# Patient Record
Sex: MALE | Race: BLACK OR AFRICAN AMERICAN | Employment: OTHER | URBAN - NONMETROPOLITAN AREA
[De-identification: names, ages, dates, MRNs, and addresses within clinical notes are randomized per-mention and may not be internally consistent; named-entity substitution may affect disease eponyms.]

---

## 2017-05-13 ENCOUNTER — HOSPITAL ENCOUNTER (EMERGENCY)
Age: 62
Discharge: HOME | End: 2017-05-13
Payer: MEDICARE

## 2017-05-13 ENCOUNTER — APPOINTMENT (OUTPATIENT)
Dept: GENERAL RADIOLOGY | Age: 62
End: 2017-05-13
Payer: MEDICARE

## 2017-05-13 VITALS
SYSTOLIC BLOOD PRESSURE: 159 MMHG | HEIGHT: 69 IN | OXYGEN SATURATION: 99 % | RESPIRATION RATE: 20 BRPM | BODY MASS INDEX: 26.51 KG/M2 | WEIGHT: 179 LBS | DIASTOLIC BLOOD PRESSURE: 107 MMHG | HEART RATE: 96 BPM | TEMPERATURE: 98.9 F

## 2017-05-13 DIAGNOSIS — M54.31 SCIATICA OF RIGHT SIDE: Primary | ICD-10-CM

## 2017-05-13 PROCEDURE — 96372 THER/PROPH/DIAG INJ SC/IM: CPT

## 2017-05-13 PROCEDURE — 99283 EMERGENCY DEPT VISIT LOW MDM: CPT | Performed by: NURSE PRACTITIONER

## 2017-05-13 PROCEDURE — 72100 X-RAY EXAM L-S SPINE 2/3 VWS: CPT

## 2017-05-13 PROCEDURE — 99283 EMERGENCY DEPT VISIT LOW MDM: CPT

## 2017-05-13 PROCEDURE — 6360000002 HC RX W HCPCS: Performed by: NURSE PRACTITIONER

## 2017-05-13 RX ORDER — LISINOPRIL 40 MG/1
40 TABLET ORAL DAILY
COMMUNITY
End: 2018-05-02 | Stop reason: SDUPTHER

## 2017-05-13 RX ORDER — NAPROXEN 500 MG/1
500 TABLET ORAL 2 TIMES DAILY
Qty: 20 TABLET | Refills: 0 | Status: SHIPPED | OUTPATIENT
Start: 2017-05-13 | End: 2020-01-01 | Stop reason: ALTCHOICE

## 2017-05-13 RX ORDER — TAMSULOSIN HYDROCHLORIDE 0.4 MG/1
0.4 CAPSULE ORAL DAILY
COMMUNITY
End: 2018-05-02

## 2017-05-13 RX ORDER — BUDESONIDE AND FORMOTEROL FUMARATE DIHYDRATE 160; 4.5 UG/1; UG/1
2 AEROSOL RESPIRATORY (INHALATION) 2 TIMES DAILY
COMMUNITY
End: 2018-05-02

## 2017-05-13 RX ORDER — HYDROCODONE BITARTRATE AND ACETAMINOPHEN 5; 325 MG/1; MG/1
1 TABLET ORAL EVERY 6 HOURS PRN
Qty: 15 TABLET | Refills: 0 | Status: SHIPPED | OUTPATIENT
Start: 2017-05-13 | End: 2017-05-20

## 2017-05-13 RX ORDER — CYCLOBENZAPRINE HCL 10 MG
10 TABLET ORAL 3 TIMES DAILY PRN
Qty: 30 TABLET | Refills: 0 | Status: SHIPPED | OUTPATIENT
Start: 2017-05-13 | End: 2017-05-23

## 2017-05-13 RX ORDER — DEXAMETHASONE SODIUM PHOSPHATE 4 MG/ML
4 INJECTION, SOLUTION INTRA-ARTICULAR; INTRALESIONAL; INTRAMUSCULAR; INTRAVENOUS; SOFT TISSUE ONCE
Status: COMPLETED | OUTPATIENT
Start: 2017-05-13 | End: 2017-05-13

## 2017-05-13 RX ORDER — ALBUTEROL SULFATE 90 UG/1
2 AEROSOL, METERED RESPIRATORY (INHALATION) EVERY 6 HOURS PRN
COMMUNITY
End: 2018-05-02

## 2017-05-13 RX ORDER — KETOROLAC TROMETHAMINE 30 MG/ML
30 INJECTION, SOLUTION INTRAMUSCULAR; INTRAVENOUS ONCE
Status: COMPLETED | OUTPATIENT
Start: 2017-05-13 | End: 2017-05-13

## 2017-05-13 RX ADMIN — DEXAMETHASONE SODIUM PHOSPHATE 4 MG: 4 INJECTION, SOLUTION INTRAMUSCULAR; INTRAVENOUS at 14:15

## 2017-05-13 RX ADMIN — KETOROLAC TROMETHAMINE 30 MG: 30 INJECTION, SOLUTION INTRAMUSCULAR at 14:15

## 2017-05-13 ASSESSMENT — PAIN SCALES - GENERAL
PAINLEVEL_OUTOF10: 10
PAINLEVEL_OUTOF10: 10

## 2017-05-13 ASSESSMENT — PAIN DESCRIPTION - PAIN TYPE: TYPE: ACUTE PAIN

## 2017-05-13 ASSESSMENT — PAIN DESCRIPTION - ORIENTATION: ORIENTATION: RIGHT

## 2017-05-13 ASSESSMENT — PAIN DESCRIPTION - LOCATION: LOCATION: BACK

## 2017-05-13 ASSESSMENT — ENCOUNTER SYMPTOMS: BACK PAIN: 1

## 2017-06-20 ENCOUNTER — TELEPHONE (OUTPATIENT)
Dept: CARDIOLOGY | Age: 62
End: 2017-06-20

## 2017-06-29 ENCOUNTER — TELEPHONE (OUTPATIENT)
Dept: CARDIOLOGY | Age: 62
End: 2017-06-29

## 2017-09-19 ENCOUNTER — TELEPHONE (OUTPATIENT)
Dept: CARDIOLOGY | Age: 62
End: 2017-09-19

## 2018-04-24 ENCOUNTER — TELEPHONE (OUTPATIENT)
Dept: CARDIOLOGY | Age: 63
End: 2018-04-24

## 2018-05-02 ENCOUNTER — HOSPITAL ENCOUNTER (EMERGENCY)
Age: 63
Discharge: HOME | End: 2018-05-02
Attending: FAMILY MEDICINE
Payer: MEDICARE

## 2018-05-02 VITALS
HEART RATE: 67 BPM | RESPIRATION RATE: 16 BRPM | SYSTOLIC BLOOD PRESSURE: 162 MMHG | WEIGHT: 179 LBS | OXYGEN SATURATION: 96 % | HEIGHT: 69 IN | DIASTOLIC BLOOD PRESSURE: 105 MMHG | TEMPERATURE: 98.2 F | BODY MASS INDEX: 26.51 KG/M2

## 2018-05-02 DIAGNOSIS — I10 HTN (HYPERTENSION): ICD-10-CM

## 2018-05-02 DIAGNOSIS — K40.91 UNILATERAL RECURRENT INGUINAL HERNIA WITHOUT OBSTRUCTION OR GANGRENE: Primary | ICD-10-CM

## 2018-05-02 DIAGNOSIS — I25.10 CAD (CORONARY ARTERY DISEASE): ICD-10-CM

## 2018-05-02 DIAGNOSIS — E78.00 HYPERCHOLESTEREMIA: ICD-10-CM

## 2018-05-02 PROCEDURE — 99283 EMERGENCY DEPT VISIT LOW MDM: CPT | Performed by: FAMILY MEDICINE

## 2018-05-02 PROCEDURE — 99283 EMERGENCY DEPT VISIT LOW MDM: CPT

## 2018-05-02 RX ORDER — PRAVASTATIN SODIUM 40 MG
40 TABLET ORAL DAILY
Qty: 30 TABLET | Refills: 3 | Status: SHIPPED | OUTPATIENT
Start: 2018-05-02

## 2018-05-02 RX ORDER — BUDESONIDE AND FORMOTEROL FUMARATE DIHYDRATE 160; 4.5 UG/1; UG/1
2 AEROSOL RESPIRATORY (INHALATION) 2 TIMES DAILY
Qty: 1 INHALER | Refills: 0 | Status: SHIPPED | OUTPATIENT
Start: 2018-05-02

## 2018-05-02 RX ORDER — ALBUTEROL SULFATE 90 UG/1
2 AEROSOL, METERED RESPIRATORY (INHALATION) EVERY 6 HOURS PRN
Qty: 1 INHALER | Refills: 0 | Status: SHIPPED | OUTPATIENT
Start: 2018-05-02

## 2018-05-02 RX ORDER — CLONIDINE HYDROCHLORIDE 0.1 MG/1
0.1 TABLET ORAL 2 TIMES DAILY
Qty: 60 TABLET | Refills: 0 | Status: SHIPPED | OUTPATIENT
Start: 2018-05-02

## 2018-05-02 RX ORDER — LISINOPRIL 20 MG/1
20 TABLET ORAL DAILY
Qty: 30 TABLET | Refills: 0 | Status: SHIPPED | OUTPATIENT
Start: 2018-05-02

## 2018-05-02 RX ORDER — ONDANSETRON 4 MG/1
4 TABLET, ORALLY DISINTEGRATING ORAL EVERY 8 HOURS PRN
Qty: 15 TABLET | Refills: 0 | Status: SHIPPED | OUTPATIENT
Start: 2018-05-02 | End: 2020-01-01 | Stop reason: ALTCHOICE

## 2018-05-02 RX ORDER — TAMSULOSIN HYDROCHLORIDE 0.4 MG/1
0.4 CAPSULE ORAL DAILY
Qty: 30 CAPSULE | Refills: 0 | Status: SHIPPED | OUTPATIENT
Start: 2018-05-02 | End: 2020-01-01 | Stop reason: ALTCHOICE

## 2018-05-02 RX ORDER — NITROGLYCERIN 0.4 MG/1
0.4 TABLET SUBLINGUAL EVERY 5 MIN PRN
Qty: 25 TABLET | Refills: 0 | Status: SHIPPED | OUTPATIENT
Start: 2018-05-02

## 2018-05-02 ASSESSMENT — PAIN SCALES - GENERAL: PAINLEVEL_OUTOF10: 3

## 2018-05-02 ASSESSMENT — ENCOUNTER SYMPTOMS
COLOR CHANGE: 0
NAUSEA: 1
SHORTNESS OF BREATH: 0
VOMITING: 1
BACK PAIN: 0
SORE THROAT: 0
DIARRHEA: 0
ABDOMINAL PAIN: 1
COUGH: 0
WHEEZING: 0

## 2018-05-02 ASSESSMENT — PAIN DESCRIPTION - LOCATION: LOCATION: ABDOMEN

## 2018-05-02 ASSESSMENT — PAIN DESCRIPTION - PAIN TYPE: TYPE: ACUTE PAIN

## 2018-07-19 ENCOUNTER — HOSPITAL ENCOUNTER (EMERGENCY)
Age: 63
Discharge: HOME | End: 2018-07-19
Payer: MEDICARE

## 2018-07-19 VITALS
WEIGHT: 179 LBS | HEIGHT: 69 IN | BODY MASS INDEX: 26.51 KG/M2 | TEMPERATURE: 97.9 F | SYSTOLIC BLOOD PRESSURE: 151 MMHG | OXYGEN SATURATION: 100 % | RESPIRATION RATE: 17 BRPM | HEART RATE: 87 BPM | DIASTOLIC BLOOD PRESSURE: 98 MMHG

## 2018-07-19 DIAGNOSIS — K40.91 UNILATERAL RECURRENT INGUINAL HERNIA WITHOUT OBSTRUCTION OR GANGRENE: Primary | ICD-10-CM

## 2018-07-19 DIAGNOSIS — Z76.0 ENCOUNTER FOR MEDICATION REFILL: ICD-10-CM

## 2018-07-19 PROCEDURE — 99283 EMERGENCY DEPT VISIT LOW MDM: CPT | Performed by: NURSE PRACTITIONER

## 2018-07-19 PROCEDURE — 99282 EMERGENCY DEPT VISIT SF MDM: CPT

## 2018-07-19 RX ORDER — LISINOPRIL 20 MG/1
20 TABLET ORAL DAILY
Qty: 14 TABLET | Refills: 0 | Status: ON HOLD | OUTPATIENT
Start: 2018-07-19 | End: 2018-09-25 | Stop reason: HOSPADM

## 2018-07-19 RX ORDER — HYDROCHLOROTHIAZIDE 25 MG/1
25 TABLET ORAL DAILY
Qty: 14 TABLET | Refills: 0 | Status: ON HOLD | OUTPATIENT
Start: 2018-07-19 | End: 2018-09-25 | Stop reason: HOSPADM

## 2018-07-19 ASSESSMENT — ENCOUNTER SYMPTOMS
NAUSEA: 0
COUGH: 0
ABDOMINAL PAIN: 0
VOMITING: 0
WHEEZING: 0
SHORTNESS OF BREATH: 0
DIARRHEA: 0
CONSTIPATION: 0

## 2018-07-19 ASSESSMENT — PAIN SCALES - GENERAL: PAINLEVEL_OUTOF10: 9

## 2018-07-19 NOTE — ED PROVIDER NOTES
CABG x 2 LIMA-LAD, VG-diagonal 11/2/2011  Cath  Patent LIMA-LAD, patent VG-diag, normal LVFX 3/18/2013  lexiscan  Positive for anterior and inferi    CAD (coronary artery disease) 11/2/2011    Cigarette smoker 8/19/2013    Cigarette smoker 11/2/2011    Hypercholesteremia 8/19/2013    Hypercholesteremia 11/2/2011    Hypertension 8/19/2013    Hypertension          SURGICAL HISTORY       Past Surgical History:   Procedure Laterality Date    CARDIAC CATHETERIZATION  8/19/13  JDT    with stent.  EF 60%    CORONARY ARTERY BYPASS GRAFT  11/7/2007    Dr. Pedraza Cool       Discharge Medication List as of 7/19/2018  1:10 PM      CONTINUE these medications which have NOT CHANGED    Details   ondansetron (ZOFRAN ODT) 4 MG disintegrating tablet Take 1 tablet by mouth every 8 hours as needed for Nausea or Vomiting, Disp-15 tablet, R-0Print      !! lisinopril (PRINIVIL) 20 MG tablet Take 1 tablet by mouth daily, Disp-30 tablet, R-0Print      cloNIDine (CATAPRES) 0.1 MG tablet Take 1 tablet by mouth 2 times daily If blood pressure greater than 195/95, Disp-60 tablet, R-0Print      !! metoprolol tartrate (LOPRESSOR) 25 MG tablet Take 1 tablet by mouth 2 times daily, Disp-60 tablet, R-3Print      nitroGLYCERIN (NITROSTAT) 0.4 MG SL tablet Place 1 tablet under the tongue every 5 minutes as needed for Chest pain, Disp-25 tablet, R-0Print      rivaroxaban (XARELTO) 10 MG TABS tablet Take 1 tablet by mouth daily (with breakfast), Disp-30 tablet, R-0Print      tamsulosin (FLOMAX) 0.4 MG capsule Take 1 capsule by mouth daily, Disp-30 capsule, R-0Print      pravastatin (PRAVACHOL) 40 MG tablet Take 1 tablet by mouth daily, Disp-30 tablet, R-3Print      budesonide-formoterol (SYMBICORT) 160-4.5 MCG/ACT AERO Inhale 2 puffs into the lungs 2 times daily, Disp-1 Inhaler, R-0Print      albuterol sulfate HFA (VENTOLIN HFA) 108 (90 Base) MCG/ACT inhaler Inhale 2 puffs into the lungs every 6 hours and intact distal pulses. Pulmonary/Chest: Effort normal and breath sounds normal. No respiratory distress. He has no wheezes. He has no rales. Abdominal: Soft. Bowel sounds are normal. He exhibits no distension and no mass. There is no tenderness. There is no rebound and no guarding. A hernia is present. Hernia confirmed positive in the left inguinal area. Musculoskeletal: Normal range of motion. Neurological: He is alert and oriented to person, place, and time. Skin: Skin is warm and dry. Vitals reviewed. DIAGNOSTIC RESULTS     RADIOLOGY:   Non-plain film images such as CT, Ultrasound and MRI are read by the radiologist. Plain radiographic images are visualized and preliminarily interpreted by No att. providers found with the below findings:      Interpretation per the Radiologist below, if available at the time of this note:    No orders to display       LABS:  Labs Reviewed - No data to display    All other labs were within normal range or not returned as of this dictation. RE-ASSESSMENT        EMERGENCY DEPARTMENT COURSE and DIFFERENTIAL DIAGNOSIS/MDM:   Vitals:    Vitals:    07/19/18 1209 07/19/18 1313   BP: (!) 158/96 (!) 151/98   Pulse: 91 87   Resp: 18 17   Temp: 97.9 °F (36.6 °C) 97.9 °F (36.6 °C)   TempSrc: Temporal    SpO2: 96% 100%   Weight: 179 lb (81.2 kg)    Height: 5' 9\" (1.753 m)          MDM  Number of Diagnoses or Management Options  Encounter for medication refill: new, no workup  Unilateral recurrent inguinal hernia without obstruction or gangrene: new, no workup  Diagnosis management comments: Patient is slightly hypertensive today. As he was during the visit in May. I feel the patient's blood pressure is elevated due to him being non-compliant with his medications. He denies having a headache, visual disturbances, dizziness, slurred speech, or facial numbness. I believe the patient is here today for refills on his blood pressure medication. He does not appear toxic.  He has tolerated oral intake without any difficulty. Instructed the patient to follow-up with Dr. Tatyana Benites as soon as possible for further evaluation of the hernia. I instructed him to follow-up with Dr. Sebas Scott this coming week. I gave him strict ER return instructions if his symptoms become worse. The patient agrees with the discharge plan. Amount and/or Complexity of Data Reviewed  Discuss the patient with other providers: yes (Dr. Davida Hernandez.  )    Risk of Complications, Morbidity, and/or Mortality  Presenting problems: low  Diagnostic procedures: low  Management options: low    Patient Progress  Patient progress: stable      Procedures      FINAL IMPRESSION      1. Unilateral recurrent inguinal hernia without obstruction or gangrene    2. Encounter for medication refill          DISPOSITION/PLAN   DISPOSITION Decision To Discharge 07/19/2018 01:02:00 PM      PATIENT REFERRED TO:  Antoni Rosa MD  [facility]  [phone]    Schedule an appointment as soon as possible for a visit   for this coming week. [facility] EMERGENCY DEPT  [facility]  [phone]    If symptoms worsen, As needed    Faraz Ingram MD  [address] [facility]  [phone]    Schedule an appointment as soon as possible for a visit         DISCHARGE MEDICATIONS:  Discharge Medication List as of 7/19/2018  1:10 PM      START taking these medications    Details   !! metoprolol tartrate (LOPRESSOR) 25 MG tablet Take 1 tablet by mouth 2 times daily for 14 days, Disp-28 tablet, R-0Print      !! hydrochlorothiazide (HYDRODIURIL) 25 MG tablet Take 1 tablet by mouth daily for 14 days, Disp-14 tablet, R-0Print      !! lisinopril (PRINIVIL) 20 MG tablet Take 1 tablet by mouth daily for 14 days, Disp-14 tablet, R-0Print       !! - Potential duplicate medications found. Please discuss with provider.           (Please note that portions of this note were completed with a voice

## 2018-09-22 ENCOUNTER — APPOINTMENT (OUTPATIENT)
Dept: GENERAL RADIOLOGY | Age: 63
DRG: 287 | End: 2018-09-22
Payer: MEDICARE

## 2018-09-22 ENCOUNTER — HOSPITAL ENCOUNTER (INPATIENT)
Age: 63
LOS: 3 days | Discharge: HOME | DRG: 287 | End: 2018-09-25
Attending: EMERGENCY MEDICINE | Admitting: INTERNAL MEDICINE
Payer: MEDICARE

## 2018-09-22 DIAGNOSIS — R77.8 ELEVATED TROPONIN: ICD-10-CM

## 2018-09-22 DIAGNOSIS — R07.89 ATYPICAL CHEST PAIN: Primary | ICD-10-CM

## 2018-09-22 LAB
ALBUMIN SERPL-MCNC: 4 G/DL (ref 3.5–5.2)
ALP BLD-CCNC: 98 U/L (ref 40–130)
ALT SERPL-CCNC: 31 U/L (ref 5–41)
ANION GAP SERPL CALCULATED.3IONS-SCNC: 9 MMOL/L (ref 7–19)
APTT: 31.4 SEC (ref 26–36.2)
AST SERPL-CCNC: 73 U/L (ref 5–40)
BASOPHILS ABSOLUTE: 0 K/UL (ref 0–0.2)
BASOPHILS RELATIVE PERCENT: 0.5 % (ref 0–1)
BILIRUB SERPL-MCNC: <0.2 MG/DL (ref 0.2–1.2)
BUN BLDV-MCNC: 9 MG/DL (ref 8–23)
CALCIUM SERPL-MCNC: 9.3 MG/DL (ref 8.8–10.2)
CHLORIDE BLD-SCNC: 103 MMOL/L (ref 98–111)
CO2: 25 MMOL/L (ref 22–29)
CREAT SERPL-MCNC: 1.1 MG/DL (ref 0.5–1.2)
EOSINOPHILS ABSOLUTE: 0.2 K/UL (ref 0–0.6)
EOSINOPHILS RELATIVE PERCENT: 2.1 % (ref 0–5)
GFR NON-AFRICAN AMERICAN: >60
GLUCOSE BLD-MCNC: 90 MG/DL (ref 74–109)
HCT VFR BLD CALC: 41.9 % (ref 42–52)
HEMOGLOBIN: 13.9 G/DL (ref 14–18)
INR BLD: 1.01 (ref 0.88–1.18)
LIPASE: 23 U/L (ref 13–60)
LYMPHOCYTES ABSOLUTE: 2.8 K/UL (ref 1.1–4.5)
LYMPHOCYTES RELATIVE PERCENT: 33.9 % (ref 20–40)
MCH RBC QN AUTO: 27.8 PG (ref 27–31)
MCHC RBC AUTO-ENTMCNC: 33.2 G/DL (ref 33–37)
MCV RBC AUTO: 83.8 FL (ref 80–94)
MONOCYTES ABSOLUTE: 0.6 K/UL (ref 0–0.9)
MONOCYTES RELATIVE PERCENT: 6.8 % (ref 0–10)
NEUTROPHILS ABSOLUTE: 4.7 K/UL (ref 1.5–7.5)
NEUTROPHILS RELATIVE PERCENT: 56.2 % (ref 50–65)
PDW BLD-RTO: 13.6 % (ref 11.5–14.5)
PLATELET # BLD: 194 K/UL (ref 130–400)
PMV BLD AUTO: 12 FL (ref 9.4–12.4)
POTASSIUM SERPL-SCNC: 4.1 MMOL/L (ref 3.5–5)
PROTHROMBIN TIME: 13.2 SEC (ref 12–14.6)
RBC # BLD: 5 M/UL (ref 4.7–6.1)
SODIUM BLD-SCNC: 137 MMOL/L (ref 136–145)
TOTAL PROTEIN: 7.1 G/DL (ref 6.6–8.7)
TROPONIN: 0.69 NG/ML (ref 0–0.03)
TROPONIN: 0.83 NG/ML (ref 0–0.03)
TROPONIN: 0.95 NG/ML (ref 0–0.03)
TROPONIN: 1 NG/ML (ref 0–0.03)
WBC # BLD: 8.3 K/UL (ref 4.8–10.8)

## 2018-09-22 PROCEDURE — 93005 ELECTROCARDIOGRAM TRACING: CPT

## 2018-09-22 PROCEDURE — 80053 COMPREHEN METABOLIC PANEL: CPT

## 2018-09-22 PROCEDURE — 71045 X-RAY EXAM CHEST 1 VIEW: CPT

## 2018-09-22 PROCEDURE — 84484 ASSAY OF TROPONIN QUANT: CPT

## 2018-09-22 PROCEDURE — 96375 TX/PRO/DX INJ NEW DRUG ADDON: CPT

## 2018-09-22 PROCEDURE — 99285 EMERGENCY DEPT VISIT HI MDM: CPT | Performed by: EMERGENCY MEDICINE

## 2018-09-22 PROCEDURE — 99223 1ST HOSP IP/OBS HIGH 75: CPT | Performed by: INTERNAL MEDICINE

## 2018-09-22 PROCEDURE — S0028 INJECTION, FAMOTIDINE, 20 MG: HCPCS | Performed by: EMERGENCY MEDICINE

## 2018-09-22 PROCEDURE — 2500000003 HC RX 250 WO HCPCS: Performed by: EMERGENCY MEDICINE

## 2018-09-22 PROCEDURE — 96372 THER/PROPH/DIAG INJ SC/IM: CPT

## 2018-09-22 PROCEDURE — 36415 COLL VENOUS BLD VENIPUNCTURE: CPT

## 2018-09-22 PROCEDURE — 85730 THROMBOPLASTIN TIME PARTIAL: CPT

## 2018-09-22 PROCEDURE — 6370000000 HC RX 637 (ALT 250 FOR IP): Performed by: EMERGENCY MEDICINE

## 2018-09-22 PROCEDURE — 83690 ASSAY OF LIPASE: CPT

## 2018-09-22 PROCEDURE — 6360000002 HC RX W HCPCS: Performed by: INTERNAL MEDICINE

## 2018-09-22 PROCEDURE — 6360000002 HC RX W HCPCS: Performed by: EMERGENCY MEDICINE

## 2018-09-22 PROCEDURE — 96374 THER/PROPH/DIAG INJ IV PUSH: CPT

## 2018-09-22 PROCEDURE — 2580000003 HC RX 258: Performed by: INTERNAL MEDICINE

## 2018-09-22 PROCEDURE — 2140000000 HC CCU INTERMEDIATE R&B

## 2018-09-22 PROCEDURE — 85610 PROTHROMBIN TIME: CPT

## 2018-09-22 PROCEDURE — 99285 EMERGENCY DEPT VISIT HI MDM: CPT

## 2018-09-22 PROCEDURE — 6370000000 HC RX 637 (ALT 250 FOR IP): Performed by: INTERNAL MEDICINE

## 2018-09-22 PROCEDURE — 85025 COMPLETE CBC W/AUTO DIFF WBC: CPT

## 2018-09-22 RX ORDER — ASPIRIN 81 MG/1
81 TABLET, CHEWABLE ORAL DAILY
Status: DISCONTINUED | OUTPATIENT
Start: 2018-09-22 | End: 2018-09-22

## 2018-09-22 RX ORDER — SODIUM CHLORIDE 0.9 % (FLUSH) 0.9 %
10 SYRINGE (ML) INJECTION EVERY 12 HOURS SCHEDULED
Status: DISCONTINUED | OUTPATIENT
Start: 2018-09-22 | End: 2018-09-22 | Stop reason: SDUPTHER

## 2018-09-22 RX ORDER — OXYCODONE HYDROCHLORIDE AND ACETAMINOPHEN 5; 325 MG/1; MG/1
2 TABLET ORAL EVERY 4 HOURS PRN
Status: DISCONTINUED | OUTPATIENT
Start: 2018-09-22 | End: 2018-09-25 | Stop reason: HOSPADM

## 2018-09-22 RX ORDER — ACETAMINOPHEN 325 MG/1
650 TABLET ORAL EVERY 4 HOURS PRN
Status: DISCONTINUED | OUTPATIENT
Start: 2018-09-22 | End: 2018-09-25 | Stop reason: HOSPADM

## 2018-09-22 RX ORDER — MAGNESIUM HYDROXIDE/ALUMINUM HYDROXICE/SIMETHICONE 120; 1200; 1200 MG/30ML; MG/30ML; MG/30ML
30 SUSPENSION ORAL ONCE
Status: COMPLETED | OUTPATIENT
Start: 2018-09-22 | End: 2018-09-22

## 2018-09-22 RX ORDER — ONDANSETRON 2 MG/ML
4 INJECTION INTRAMUSCULAR; INTRAVENOUS EVERY 6 HOURS PRN
Status: DISCONTINUED | OUTPATIENT
Start: 2018-09-22 | End: 2018-09-22

## 2018-09-22 RX ORDER — PRAVASTATIN SODIUM 20 MG
40 TABLET ORAL NIGHTLY
Status: DISCONTINUED | OUTPATIENT
Start: 2018-09-22 | End: 2018-09-25 | Stop reason: HOSPADM

## 2018-09-22 RX ORDER — ASPIRIN 81 MG/1
81 TABLET, CHEWABLE ORAL DAILY
Status: DISCONTINUED | OUTPATIENT
Start: 2018-09-23 | End: 2018-09-25 | Stop reason: HOSPADM

## 2018-09-22 RX ORDER — HYDROCODONE BITARTRATE AND ACETAMINOPHEN 5; 325 MG/1; MG/1
1 TABLET ORAL EVERY 8 HOURS PRN
Status: DISCONTINUED | OUTPATIENT
Start: 2018-09-22 | End: 2018-09-25 | Stop reason: HOSPADM

## 2018-09-22 RX ORDER — CLONIDINE HYDROCHLORIDE 0.1 MG/1
0.1 TABLET ORAL 2 TIMES DAILY
Status: DISCONTINUED | OUTPATIENT
Start: 2018-09-22 | End: 2018-09-25 | Stop reason: HOSPADM

## 2018-09-22 RX ORDER — SODIUM CHLORIDE 0.9 % (FLUSH) 0.9 %
10 SYRINGE (ML) INJECTION PRN
Status: DISCONTINUED | OUTPATIENT
Start: 2018-09-22 | End: 2018-09-22

## 2018-09-22 RX ORDER — HYDROCHLOROTHIAZIDE 25 MG/1
25 TABLET ORAL DAILY
Status: DISCONTINUED | OUTPATIENT
Start: 2018-09-22 | End: 2018-09-25 | Stop reason: HOSPADM

## 2018-09-22 RX ORDER — SODIUM CHLORIDE 0.9 % (FLUSH) 0.9 %
10 SYRINGE (ML) INJECTION EVERY 12 HOURS SCHEDULED
Status: DISCONTINUED | OUTPATIENT
Start: 2018-09-22 | End: 2018-09-25 | Stop reason: HOSPADM

## 2018-09-22 RX ORDER — ASPIRIN 81 MG/1
81 TABLET, CHEWABLE ORAL DAILY
Status: DISCONTINUED | OUTPATIENT
Start: 2018-09-23 | End: 2018-09-22 | Stop reason: SDUPTHER

## 2018-09-22 RX ORDER — TAMSULOSIN HYDROCHLORIDE 0.4 MG/1
0.4 CAPSULE ORAL DAILY
Status: DISCONTINUED | OUTPATIENT
Start: 2018-09-22 | End: 2018-09-25 | Stop reason: HOSPADM

## 2018-09-22 RX ORDER — LISINOPRIL 20 MG/1
20 TABLET ORAL DAILY
Status: DISCONTINUED | OUTPATIENT
Start: 2018-09-22 | End: 2018-09-25 | Stop reason: HOSPADM

## 2018-09-22 RX ORDER — MORPHINE SULFATE/0.9% NACL/PF 1 MG/ML
4 SYRINGE (ML) INJECTION EVERY 4 HOURS PRN
Status: DISCONTINUED | OUTPATIENT
Start: 2018-09-22 | End: 2018-09-25 | Stop reason: HOSPADM

## 2018-09-22 RX ORDER — OXYCODONE HYDROCHLORIDE AND ACETAMINOPHEN 5; 325 MG/1; MG/1
1 TABLET ORAL EVERY 4 HOURS PRN
Status: DISCONTINUED | OUTPATIENT
Start: 2018-09-22 | End: 2018-09-25 | Stop reason: HOSPADM

## 2018-09-22 RX ORDER — ONDANSETRON 2 MG/ML
4 INJECTION INTRAMUSCULAR; INTRAVENOUS EVERY 6 HOURS PRN
Status: DISCONTINUED | OUTPATIENT
Start: 2018-09-22 | End: 2018-09-25 | Stop reason: HOSPADM

## 2018-09-22 RX ADMIN — FAMOTIDINE 20 MG: 10 INJECTION, SOLUTION INTRAVENOUS at 13:24

## 2018-09-22 RX ADMIN — ALUMINUM HYDROXIDE, MAGNESIUM HYDROXIDE, AND SIMETHICONE 30 ML: 200; 200; 20 SUSPENSION ORAL at 13:24

## 2018-09-22 RX ADMIN — HYDROCHLOROTHIAZIDE 25 MG: 25 TABLET ORAL at 18:37

## 2018-09-22 RX ADMIN — CLONIDINE HYDROCHLORIDE 0.1 MG: 0.1 TABLET ORAL at 20:09

## 2018-09-22 RX ADMIN — Medication 10 ML: at 20:10

## 2018-09-22 RX ADMIN — ENOXAPARIN SODIUM 80 MG: 80 INJECTION SUBCUTANEOUS at 14:33

## 2018-09-22 RX ADMIN — METOPROLOL TARTRATE 25 MG: 25 TABLET ORAL at 20:09

## 2018-09-22 RX ADMIN — TAMSULOSIN HYDROCHLORIDE 0.4 MG: 0.4 CAPSULE ORAL at 18:37

## 2018-09-22 RX ADMIN — LISINOPRIL 20 MG: 20 TABLET ORAL at 18:37

## 2018-09-22 RX ADMIN — OXYCODONE HYDROCHLORIDE AND ACETAMINOPHEN 1 TABLET: 5; 325 TABLET ORAL at 21:47

## 2018-09-22 RX ADMIN — ENOXAPARIN SODIUM 80 MG: 80 INJECTION SUBCUTANEOUS at 23:55

## 2018-09-22 RX ADMIN — PRAVASTATIN SODIUM 40 MG: 20 TABLET ORAL at 20:09

## 2018-09-22 RX ADMIN — ONDANSETRON 4 MG: 2 INJECTION INTRAMUSCULAR; INTRAVENOUS at 20:09

## 2018-09-22 RX ADMIN — Medication 4 MG: at 13:24

## 2018-09-22 RX ADMIN — MOMETASONE FUROATE AND FORMOTEROL FUMARATE DIHYDRATE 2 PUFF: 100; 5 AEROSOL RESPIRATORY (INHALATION) at 20:09

## 2018-09-22 ASSESSMENT — ENCOUNTER SYMPTOMS
NAUSEA: 0
DIARRHEA: 0
RHINORRHEA: 0
BACK PAIN: 0
CONSTIPATION: 0
CHEST TIGHTNESS: 0
SORE THROAT: 0
ABDOMINAL DISTENTION: 0
BLOOD IN STOOL: 0
TROUBLE SWALLOWING: 0
ABDOMINAL PAIN: 1
VOMITING: 0
SHORTNESS OF BREATH: 0
COUGH: 0

## 2018-09-22 ASSESSMENT — PAIN SCALES - GENERAL
PAINLEVEL_OUTOF10: 0
PAINLEVEL_OUTOF10: 6
PAINLEVEL_OUTOF10: 5

## 2018-09-22 NOTE — ED NOTES
Bed: 08  Expected date:   Expected time:   Means of arrival:   Comments:  Ems chest pain     Julia Leon RN  09/22/18 [id number]

## 2018-09-22 NOTE — H&P
[facility]  History & Physical    History obtained from:   [x] Patient  [x] Other (specify):     Cc:  Chest pain    HPI: Mr. Anola Burkitt is a 58 y.o. male with a history of CABG with LIMA LAD and SVG to DIag who had last cath in 2013 with BMS to SVG diag and LIMA LAD occluded. LAD diffuse disease, RCA mild disease and CFX mild disease. Admitted today with ACS    ROS    Past Medical History:   Diagnosis Date    CAD (coronary artery disease) 8/19/2013    10/22/2004  Stent to LAD 12/17/2004  Cath  Patent stent in the LAD, normal LVFX   6/9/2005  Cath  Patent stent in the LAD, normal LVFX 9/19/2005  cardiolite negative for myocardial ischemia 7/24/2006  Non Q wave MI 7/24/2006  PTCA to diagonal 11/7/2007  CABG x 2 LIMA-LAD, VG-diagonal 11/2/2011  Cath  Patent LIMA-LAD, patent VG-diag, normal LVFX 3/18/2013  lexiscan  Positive for anterior and inferi    Cigarette smoker 8/19/2013    Hypercholesteremia 8/19/2013    Hypertension 8/19/2013       Past Surgical History:   Procedure Laterality Date    CARDIAC CATHETERIZATION  8/19/13  JDT    with stent. EF 60%    CORONARY ARTERY BYPASS GRAFT  11/7/2007    Dr. oClten Arambula         Family History   Problem Relation Age of Onset    High Blood Pressure Mother     High Blood Pressure Father     Cancer Sister        Social History     Social History    Marital status:      Spouse name: N/A    Number of children: N/A    Years of education: N/A     Occupational History    Not on file.      Social History Main Topics    Smoking status: Current Every Day Smoker     Packs/day: 1.00     Years: 14.00    Smokeless tobacco: Never Used    Alcohol use Yes      Comment: occas---once/year    Drug use: No    Sexual activity: Not on file     Other Topics Concern    Not on file     Social History Narrative    ** Merged History Encounter **            Allergies   Allergen Reactions    Sulfa Antibiotics     Iv Contrast [Iodides] rash       Current Meds   sodium chloride flush  10 mL Intravenous 2 times per day    aspirin  81 mg Oral Daily       Current Infused Meds      PE:  Vitals:    09/22/18 1600   BP:    Pulse: 66   Resp:    Temp:    SpO2:      No intake or output data in the 24 hours ending 09/22/18 1718  Estimated body mass index is 25.77 kg/m² as calculated from the following:    Height as of this encounter: 5' 9\" (1.753 m). Weight as of this encounter: 174 lb 8 oz (79.2 kg).     General - No acute distress  Eyes - PERRL, anicteric sclerae; no lid-lag  ENMT - Atraumatic; Mucous membranes moist, oropharynx clear  Neck - trachea midline, thyroid non-tender  Cardio - No jugular venous distension                Clear s1 s2, no gallop, rub, murmur                 No edema, normal pulses  Resp - Normal effort, Clear to auscultation bilaterally  GI - abdomen soft, non-tender, no hepatosplenomegaly  Skin - warm and dry; no rashes  Psych - A+O x 3, normal affect    Recent Labs      09/22/18   1249   WBC  8.3   HGB  13.9*   PLT  194     Recent Labs      09/22/18   1249   NA  137   K  4.1   CL  103   CO2  25   BUN  9   CREATININE  1.1   LABGLOM  >60   CALCIUM  9.3     Recent Labs      09/22/18   1249  09/22/18   1430   TROPONINI  0.69*  0.83*       ECG 09/22/18     TTE    Cath      Assessment, Recommendations & Plan:  58 y.o. male with ACS will need LHC early next week     Place on lovenox and optimize medical therapy

## 2018-09-22 NOTE — CARE COORDINATION
Was asked to see patient re: his inability to afford his medications. Evidently when patient was here in the ED in July, he was given several prescriptions to fill and he stated to me that they were going to cost over $300 so he didn't fill them. Asked him if he had been to the doctor since that ER visit and he said he had. I asked him if he had said anything to his PCP about his inability to pay for his medications and he said no. Patient has drug coverage from [facility] and uses [facility] in [place] and sometimes [facility] at [place]. [facility] closed, [facility] unable to assist in determining costs without current scripts. Call placed to [facility] and spoke with Laxmi Macias. She told me that the patient is current with them and is not in the donut hole. She kindly gave me the copay amounts for the following medications:  NTG $12, Xarelto $47, metoprolol $5, Lisinopril $5, Zofran $2.85, Flomax $14.20 ,Pravachol $7, Symbicort $47, and Ventolin $47.  Per Sotero Rose in pharmacy, could substitute albuterol nebs for the Ventolin and it would probably be more cost effective (family states they have a nebulizer at home) and possibly Pulmicort nebs for the Symbicort. Gave family info for Xarelto support program and encouraged her to go online and register for any assistance they could get to help with the cost of the Xarelto. While the total copay is only $188, that amt appears to be somewhat cost prohibitive for this patient. Understand patient to be admitted. ..have given the family the copays quoted by [facility] so they will know what to expect upon discharge. Discharge planner/Case management to follow while an inpatient.   Electronically signed by Kala Lynn RN on 9/22/2018 at 2:31 PM

## 2018-09-22 NOTE — ED NOTES
Chema Counter ASSESSMENT:  Pt co CP since 0200 am, took 1 nitro with relief. Pt denies pain at this time, denies SOB. SKIN:  Warm, dry, pink. Cap refill < 2 sec  CARDIAC:  S1 S2 noted  LUNGS: clear upper and lower lobes. Respirations even and unlabored. ABDOMEN: bowel sounds noted upper and lower quadrants. Soft and tender. EXTREMITIES: bilateral DP and PT. No edema noted. Pt alert and oriented x4. Pupils equal and reactive. No distress noted. Side rails up and call light within reach.          Pao Ruiz RN  09/22/18 [id number]

## 2018-09-22 NOTE — ED PROVIDER NOTES
mouth daily     HYDROCHLOROTHIAZIDE (HYDRODIURIL) 25 MG TABLET    Take 1 tablet by mouth daily for 14 days    HYDROCODONE-ACETAMINOPHEN (NORCO) 5-325 MG PER TABLET    Take 1 tablet by mouth every 8 hours as needed for Pain for up to 20 doses. Sedation precautions please    LISINOPRIL (PRINIVIL) 20 MG TABLET    Take 1 tablet by mouth daily    LISINOPRIL (PRINIVIL) 20 MG TABLET    Take 1 tablet by mouth daily for 14 days    METOPROLOL TARTRATE (LOPRESSOR) 25 MG TABLET    Take 1 tablet by mouth 2 times daily    METOPROLOL TARTRATE (LOPRESSOR) 25 MG TABLET    Take 1 tablet by mouth 2 times daily for 14 days    NAPROXEN (NAPROSYN) 500 MG TABLET    Take 1 tablet by mouth 2 times daily    NITROGLYCERIN (NITROSTAT) 0.4 MG SL TABLET    Place 1 tablet under the tongue every 5 minutes as needed for Chest pain    ONDANSETRON (ZOFRAN ODT) 4 MG DISINTEGRATING TABLET    Take 1 tablet by mouth every 8 hours as needed for Nausea or Vomiting    PRAVASTATIN (PRAVACHOL) 40 MG TABLET    Take 1 tablet by mouth daily    RIVAROXABAN (XARELTO) 10 MG TABS TABLET    Take 1 tablet by mouth daily (with breakfast)    TAMSULOSIN (FLOMAX) 0.4 MG CAPSULE    Take 1 capsule by mouth daily       ALLERGIES     Sulfa antibiotics and Iv contrast [iodides]    FAMILY HISTORY     No family history on file.     SOCIAL HISTORY       Social History     Social History    Marital status:      Spouse name: N/A    Number of children: N/A    Years of education: N/A     Social History Main Topics    Smoking status: Current Every Day Smoker     Packs/day: 1.00    Smokeless tobacco: Not on file    Alcohol use Yes      Comment: occas    Drug use: No    Sexual activity: Not on file     Other Topics Concern    Not on file     Social History Narrative    ** Merged History Encounter **            SCREENINGS           PHYSICAL EXAM    (up to 7 for level 4, 8 or more for level 5)     ED Triage Vitals [09/22/18 1242]   BP Temp Temp Source Pulse Resp SpO2 Height Weight   (!) 166/110 98.1 °F (36.7 °C) Oral 60 16 98 % 5' 9\" (1.753 m) 179 lb (81.2 kg)       Physical Exam   Constitutional: He is oriented to person, place, and time. He appears well-developed and well-nourished. No distress. HENT:   Head: Normocephalic and atraumatic. Mouth/Throat: Oropharynx is clear and moist.   Eyes: Pupils are equal, round, and reactive to light. EOM are normal. No scleral icterus. Neck: Normal range of motion. Neck supple. No tracheal deviation present. Cardiovascular: Normal rate, regular rhythm, normal heart sounds and intact distal pulses. Exam reveals no gallop and no friction rub. No murmur heard. Pulmonary/Chest: Effort normal and breath sounds normal. No respiratory distress. He has no wheezes. He has no rales. He exhibits no tenderness. Abdominal: Soft. He exhibits no distension and no mass. There is tenderness (Mild epigastric). There is no rebound and no guarding. Musculoskeletal: Normal range of motion. He exhibits no edema, tenderness or deformity. Neurological: He is alert and oriented to person, place, and time. No cranial nerve deficit. He exhibits normal muscle tone. Coordination normal.   Skin: Skin is warm and dry. No rash noted. Psychiatric: He has a normal mood and affect. His behavior is normal. Judgment and thought content normal.   Nursing note and vitals reviewed. DIAGNOSTIC RESULTS     EKG: All EKG's are interpreted by the Emergency Department Physician who either signs or Co-signs this chart in the absence of a cardiologist.    12 38: Sinus rhythm, 64 bpm, no STEMI, no ischemic changes, T waves inversions in leads 3 and V6, similar morphology to EKG in December 20, 2016. Repeat EKG at 1431: Sinus rhythm, bradycardic, 58 bpm, no STEMI, no ischemic changes, possible half millimeter ST elevation concave upwards in lead V1, T-wave inversions in lead 3 have resolved, V6 appears to be biphasic.  No other significant morphological changes from disposition dependent upon workup. ED Course  Patient was seen and evaluated for his chest pain. He was pain-free on his evaluation in the emergency department. Labs are drawn, EKG obtained, chest x-ray obtained, troponin came back at 0.69. Cardiology consult and spoke with Dr. Luiza Thomas for cardiology. We'll admit patient. We'll anticoagulate with full dose Lovenox. 4 consider patient regarding expenses limits. Patient remains pain-free at this point in time. I had a detailed discussion with the patient and/or guarding regarding the historical points, exam findings, and any diagnostic results supporting the admission diagnosis. The patient was educated on care and need for admission. Questions were invited and answered. Patient/guardian shows understanding of admission information and agrees to follow. CONSULTS:  IP CONSULT TO CARDIOLOGY    PROCEDURES:  Unless otherwise noted below, none     Procedures    FINAL IMPRESSION      1. Atypical chest pain    2.  Elevated troponin          DISPOSITION/PLAN   DISPOSITION Admitted 09/22/2018 02:27:21 PM      PATIENT REFERRED TO:  Hai Vick MD  [facility]  [phone]            DISCHARGE MEDICATIONS:  New Prescriptions    No medications on file          (Please note that portions of this note were completed with a voice recognition program.  Efforts were made to edit the dictations but occasionally words are mis-transcribed.)    Verónica Bustillo MD (electronically signed)  Attending Emergency Physician          Verónica Bustillo MD  09/22/18 476 Hugh Aguirre MD  09/22/18 1871

## 2018-09-23 LAB
ANION GAP SERPL CALCULATED.3IONS-SCNC: 13 MMOL/L (ref 7–19)
BUN BLDV-MCNC: 8 MG/DL (ref 8–23)
CALCIUM SERPL-MCNC: 9.1 MG/DL (ref 8.8–10.2)
CHLORIDE BLD-SCNC: 103 MMOL/L (ref 98–111)
CHOLESTEROL, TOTAL: 170 MG/DL (ref 160–199)
CO2: 24 MMOL/L (ref 22–29)
CREAT SERPL-MCNC: 1.2 MG/DL (ref 0.5–1.2)
EKG P AXIS: -5 DEGREES
EKG P-R INTERVAL: 140 MS
EKG Q-T INTERVAL: 428 MS
EKG QRS DURATION: 84 MS
EKG QTC CALCULATION (BAZETT): 435 MS
EKG T AXIS: -19 DEGREES
GFR NON-AFRICAN AMERICAN: >60
GLUCOSE BLD-MCNC: 117 MG/DL (ref 74–109)
HCT VFR BLD CALC: 41.8 % (ref 42–52)
HDLC SERPL-MCNC: 34 MG/DL (ref 55–121)
HEMOGLOBIN: 14 G/DL (ref 14–18)
LDL CHOLESTEROL CALCULATED: 104 MG/DL
MCH RBC QN AUTO: 28.3 PG (ref 27–31)
MCHC RBC AUTO-ENTMCNC: 33.5 G/DL (ref 33–37)
MCV RBC AUTO: 84.4 FL (ref 80–94)
PDW BLD-RTO: 13.6 % (ref 11.5–14.5)
PLATELET # BLD: 205 K/UL (ref 130–400)
PMV BLD AUTO: 11.1 FL (ref 9.4–12.4)
POTASSIUM REFLEX MAGNESIUM: 3.9 MMOL/L (ref 3.5–5)
RBC # BLD: 4.95 M/UL (ref 4.7–6.1)
SODIUM BLD-SCNC: 140 MMOL/L (ref 136–145)
TRIGL SERPL-MCNC: 158 MG/DL (ref 0–149)
WBC # BLD: 11.6 K/UL (ref 4.8–10.8)

## 2018-09-23 PROCEDURE — 85027 COMPLETE CBC AUTOMATED: CPT

## 2018-09-23 PROCEDURE — 36415 COLL VENOUS BLD VENIPUNCTURE: CPT

## 2018-09-23 PROCEDURE — 2140000000 HC CCU INTERMEDIATE R&B

## 2018-09-23 PROCEDURE — 80061 LIPID PANEL: CPT

## 2018-09-23 PROCEDURE — 2580000003 HC RX 258: Performed by: INTERNAL MEDICINE

## 2018-09-23 PROCEDURE — 99233 SBSQ HOSP IP/OBS HIGH 50: CPT | Performed by: INTERNAL MEDICINE

## 2018-09-23 PROCEDURE — 80048 BASIC METABOLIC PNL TOTAL CA: CPT

## 2018-09-23 PROCEDURE — 6370000000 HC RX 637 (ALT 250 FOR IP): Performed by: INTERNAL MEDICINE

## 2018-09-23 PROCEDURE — 6360000002 HC RX W HCPCS: Performed by: INTERNAL MEDICINE

## 2018-09-23 RX ORDER — SODIUM CHLORIDE 0.9 % (FLUSH) 0.9 %
10 SYRINGE (ML) INJECTION EVERY 12 HOURS SCHEDULED
Status: DISCONTINUED | OUTPATIENT
Start: 2018-09-23 | End: 2018-09-24

## 2018-09-23 RX ORDER — ASPIRIN 325 MG
325 TABLET ORAL ONCE
Status: COMPLETED | OUTPATIENT
Start: 2018-09-23 | End: 2018-09-24

## 2018-09-23 RX ORDER — SODIUM CHLORIDE 0.9 % (FLUSH) 0.9 %
10 SYRINGE (ML) INJECTION PRN
Status: DISCONTINUED | OUTPATIENT
Start: 2018-09-23 | End: 2018-09-24

## 2018-09-23 RX ADMIN — Medication 10 ML: at 10:25

## 2018-09-23 RX ADMIN — ASPIRIN 81 MG CHEWABLE TABLET 81 MG: 81 TABLET CHEWABLE at 10:25

## 2018-09-23 RX ADMIN — HYDROCHLOROTHIAZIDE 25 MG: 25 TABLET ORAL at 10:25

## 2018-09-23 RX ADMIN — METOPROLOL TARTRATE 25 MG: 25 TABLET ORAL at 10:25

## 2018-09-23 RX ADMIN — MOMETASONE FUROATE AND FORMOTEROL FUMARATE DIHYDRATE 2 PUFF: 100; 5 AEROSOL RESPIRATORY (INHALATION) at 10:25

## 2018-09-23 RX ADMIN — LISINOPRIL 20 MG: 20 TABLET ORAL at 10:25

## 2018-09-23 RX ADMIN — PRAVASTATIN SODIUM 40 MG: 20 TABLET ORAL at 20:06

## 2018-09-23 RX ADMIN — CLONIDINE HYDROCHLORIDE 0.1 MG: 0.1 TABLET ORAL at 10:25

## 2018-09-23 RX ADMIN — TAMSULOSIN HYDROCHLORIDE 0.4 MG: 0.4 CAPSULE ORAL at 10:25

## 2018-09-23 RX ADMIN — ONDANSETRON 4 MG: 2 INJECTION INTRAMUSCULAR; INTRAVENOUS at 02:32

## 2018-09-23 RX ADMIN — ENOXAPARIN SODIUM 80 MG: 80 INJECTION SUBCUTANEOUS at 10:25

## 2018-09-23 RX ADMIN — METOPROLOL TARTRATE 25 MG: 25 TABLET ORAL at 20:07

## 2018-09-23 RX ADMIN — ENOXAPARIN SODIUM 80 MG: 80 INJECTION SUBCUTANEOUS at 20:07

## 2018-09-23 RX ADMIN — CLONIDINE HYDROCHLORIDE 0.1 MG: 0.1 TABLET ORAL at 20:07

## 2018-09-23 NOTE — PLAN OF CARE
Problem: Tobacco Use:  Goal: Will participate in inpatient tobacco-use cessation counseling  Will participate in inpatient tobacco-use cessation counseling   Outcome: Ongoing      Problem: Falls - Risk of:  Goal: Will remain free from falls  Will remain free from falls   Outcome: Ongoing    Goal: Absence of physical injury  Absence of physical injury   Outcome: Ongoing      Problem: Pain:  Goal: Pain level will decrease  Pain level will decrease  Outcome: Ongoing    Goal: Control of acute pain  Control of acute pain  Outcome: Ongoing    Goal: Control of chronic pain  Control of chronic pain  Outcome: Ongoing      Problem: Cardiac:  Goal: Ability to maintain vital signs within normal range will improve  Ability to maintain vital signs within normal range will improve  Outcome: Ongoing    Goal: Cardiovascular alteration will improve  Cardiovascular alteration will improve  Outcome: Ongoing

## 2018-09-24 LAB
EKG P AXIS: 67 DEGREES
EKG P-R INTERVAL: 138 MS
EKG Q-T INTERVAL: 438 MS
EKG QRS DURATION: 74 MS
EKG QTC CALCULATION (BAZETT): 435 MS
EKG T AXIS: 85 DEGREES

## 2018-09-24 PROCEDURE — 6360000002 HC RX W HCPCS: Performed by: INTERNAL MEDICINE

## 2018-09-24 PROCEDURE — B215YZZ FLUOROSCOPY OF LEFT HEART USING OTHER CONTRAST: ICD-10-PCS | Performed by: INTERNAL MEDICINE

## 2018-09-24 PROCEDURE — S0028 INJECTION, FAMOTIDINE, 20 MG: HCPCS

## 2018-09-24 PROCEDURE — 2580000003 HC RX 258: Performed by: INTERNAL MEDICINE

## 2018-09-24 PROCEDURE — 2500000003 HC RX 250 WO HCPCS

## 2018-09-24 PROCEDURE — 93459 L HRT ART/GRFT ANGIO: CPT | Performed by: INTERNAL MEDICINE

## 2018-09-24 PROCEDURE — 6360000002 HC RX W HCPCS

## 2018-09-24 PROCEDURE — C1894 INTRO/SHEATH, NON-LASER: HCPCS

## 2018-09-24 PROCEDURE — B211YZZ FLUOROSCOPY OF MULTIPLE CORONARY ARTERIES USING OTHER CONTRAST: ICD-10-PCS | Performed by: INTERNAL MEDICINE

## 2018-09-24 PROCEDURE — 93458 L HRT ARTERY/VENTRICLE ANGIO: CPT | Performed by: INTERNAL MEDICINE

## 2018-09-24 PROCEDURE — 99024 POSTOP FOLLOW-UP VISIT: CPT | Performed by: INTERNAL MEDICINE

## 2018-09-24 PROCEDURE — C1760 CLOSURE DEV, VASC: HCPCS

## 2018-09-24 PROCEDURE — B212YZZ FLUOROSCOPY OF SINGLE CORONARY ARTERY BYPASS GRAFT USING OTHER CONTRAST: ICD-10-PCS | Performed by: INTERNAL MEDICINE

## 2018-09-24 PROCEDURE — 2709999900 HC NON-CHARGEABLE SUPPLY

## 2018-09-24 PROCEDURE — 6370000000 HC RX 637 (ALT 250 FOR IP): Performed by: INTERNAL MEDICINE

## 2018-09-24 PROCEDURE — 2140000000 HC CCU INTERMEDIATE R&B

## 2018-09-24 PROCEDURE — B218YZZ FLUOROSCOPY OF LEFT INTERNAL MAMMARY BYPASS GRAFT USING OTHER CONTRAST: ICD-10-PCS | Performed by: INTERNAL MEDICINE

## 2018-09-24 PROCEDURE — B41FYZZ FLUOROSCOPY OF RIGHT LOWER EXTREMITY ARTERIES USING OTHER CONTRAST: ICD-10-PCS | Performed by: INTERNAL MEDICINE

## 2018-09-24 PROCEDURE — 4A023N7 MEASUREMENT OF CARDIAC SAMPLING AND PRESSURE, LEFT HEART, PERCUTANEOUS APPROACH: ICD-10-PCS | Performed by: INTERNAL MEDICINE

## 2018-09-24 RX ORDER — SODIUM CHLORIDE 0.9 % (FLUSH) 0.9 %
10 SYRINGE (ML) INJECTION PRN
Status: DISCONTINUED | OUTPATIENT
Start: 2018-09-24 | End: 2018-09-25 | Stop reason: HOSPADM

## 2018-09-24 RX ORDER — SODIUM CHLORIDE 0.9 % (FLUSH) 0.9 %
10 SYRINGE (ML) INJECTION EVERY 12 HOURS SCHEDULED
Status: DISCONTINUED | OUTPATIENT
Start: 2018-09-24 | End: 2018-09-25 | Stop reason: HOSPADM

## 2018-09-24 RX ORDER — SODIUM CHLORIDE 9 MG/ML
INJECTION, SOLUTION INTRAVENOUS CONTINUOUS
Status: DISCONTINUED | OUTPATIENT
Start: 2018-09-24 | End: 2018-09-25 | Stop reason: HOSPADM

## 2018-09-24 RX ADMIN — HYDROCHLOROTHIAZIDE 25 MG: 25 TABLET ORAL at 09:14

## 2018-09-24 RX ADMIN — ASPIRIN 325 MG ORAL TABLET 325 MG: 325 PILL ORAL at 06:35

## 2018-09-24 RX ADMIN — MOMETASONE FUROATE AND FORMOTEROL FUMARATE DIHYDRATE 2 PUFF: 100; 5 AEROSOL RESPIRATORY (INHALATION) at 09:14

## 2018-09-24 RX ADMIN — CLONIDINE HYDROCHLORIDE 0.1 MG: 0.1 TABLET ORAL at 20:44

## 2018-09-24 RX ADMIN — TAMSULOSIN HYDROCHLORIDE 0.4 MG: 0.4 CAPSULE ORAL at 09:14

## 2018-09-24 RX ADMIN — CLONIDINE HYDROCHLORIDE 0.1 MG: 0.1 TABLET ORAL at 09:15

## 2018-09-24 RX ADMIN — METOPROLOL TARTRATE 25 MG: 25 TABLET ORAL at 09:15

## 2018-09-24 RX ADMIN — METOPROLOL TARTRATE 25 MG: 25 TABLET ORAL at 20:44

## 2018-09-24 RX ADMIN — Medication 10 ML: at 20:44

## 2018-09-24 RX ADMIN — MOMETASONE FUROATE AND FORMOTEROL FUMARATE DIHYDRATE 2 PUFF: 100; 5 AEROSOL RESPIRATORY (INHALATION) at 20:50

## 2018-09-24 RX ADMIN — ASPIRIN 81 MG CHEWABLE TABLET 81 MG: 81 TABLET CHEWABLE at 09:14

## 2018-09-24 RX ADMIN — ENOXAPARIN SODIUM 80 MG: 80 INJECTION SUBCUTANEOUS at 20:44

## 2018-09-24 RX ADMIN — PRAVASTATIN SODIUM 40 MG: 20 TABLET ORAL at 20:44

## 2018-09-24 RX ADMIN — LISINOPRIL 20 MG: 20 TABLET ORAL at 09:15

## 2018-09-24 RX ADMIN — SODIUM CHLORIDE: 9 INJECTION, SOLUTION INTRAVENOUS at 06:37

## 2018-09-24 ASSESSMENT — PAIN SCALES - GENERAL
PAINLEVEL_OUTOF10: 0

## 2018-09-24 NOTE — PROCEDURES
Cardiac Risk Profile -         Risk Factor Yes / No / Unknown       Gender Male   Cigarette Use Yes:    Family History of Cardiovascular Disease Yes: high blood pressure   Diabetes Mellitus no   Hypercholesteremia yes   Hypertension yes        Prior Cardiac History -    10/22/2004  Cath  Stent to LAD, normal LVFX  12/17/2004  Cath  Patent stent in the LAD, normal LVFX  6/9/2005  Cath  Patent stent in the LAD, normal LVFX  9/19/2005  cardiolite negative for myocardial ischemia, EF 40%  7/24/2006  Non Q wave MI  7/24/2006  Cath  ptca to the diag  11/6/2007  Cath  instent restenosis of the LAD stent, normal LVFX  11/7/2007  CABG x 2 LIMA-LAD, VG-diag Lena )  11/2/2011  Cath  Patent LIMA-LAD, patent VG-diag, normal LVFX  3/19/2013  lexiscan Positive for anterior and inferior myocardial ischemia, EF 52%, 6% ischemic myocardium on stress, intermediate risk findings, AUC indication 16, AUC score 7  8/19/2013  Cath  Occluded LIMA-LAD, 2.0x8 BMS to VG-D1, normal LVFX   9/22/2018  ACS SARAH RISK Score 4 (angina, cad> 50, hypertension, hypercholesteremia, cigarette use, asa), AUC indication 3, AUC score 8   9/24/18  Cath  Patent LIMA-LAD, occluded VG-diag, anterior lateral hypokinesis, EF 45%        Indications for Cardiac Catheterization -  9/22/2018  ACS SARAH RISK Score 4 (angina, cad> 50, hypertension, hypercholesteremia, cigarette use, asa), AUC indication 3, AUC score 8 , Diagnostic Catheterization Appropriate Use Criteria Description, [facility] 2012;59:[id number]    After obtaining informed written consent, the patient was brought to the catheterization laboratory where the right groin was prepared in the usual sterile fashion. 2% lidocaine was injected above the common femoral artery. Utilizing ultrasound assistance and the Seldinger technique, the common femoral artery was cannulated and bright red pulsatile blood returned. Using fluoroscopy guidance, the J-tip wire was placed in the common femoral artery.  A 6-Fr sheath

## 2018-09-24 NOTE — PROGRESS NOTES
200 ml   Net              430 ml       Physical Examination:    BP 96/70   Pulse 62   Temp 97.5 °F (36.4 °C) (Temporal)   Resp 18   Ht 5' 9\" (1.753 m)   Wt 164 lb 9.6 oz (74.7 kg)   SpO2 99%   BMI 24.31 kg/m²     GENERAL - well developed and well nourished; is an active participant in this examination  HEENT   PERRLA, Hearing appears normal, conjunctiva and lids are normal, ears and nose appear normal  NECK - no thyromegaly, no JVD, trachea is in the midline  CARDIOVASCULAR  PMI is in the left mid line clavicular position, Normal S1 and S2 with a grade 1/6 systolic murmur. No S3 or S4    PULMONARY  No respiratory distress. Scattered\ wheezes and rales. Breath sounds in both  lung fields are Decreased  ABDOMEN   soft, non tender, no rebound, no hepatomegaly or splenomegaly  MUSCULOSKELETAL   Prone/Supine, digitals and nails are without clubbing or cyanosis  EXTREMITIES - trace edema  NEUROLOGIC - cranial nerves, II-XII, are normal  SKIN - turgor is normal, no rash  PSYCHIATRIC - normal mood and affect, alert and orientated x 3, judgement and insight appear appropriate      LABORATORY EVALUATION & TESTING:    I have personally reviewed and interpreted the results of the following diagnostic endeavors     CBC:   Recent Labs      09/22/18   1249  09/23/18   0203   WBC  8.3  11.6*   HGB  13.9*  14.0   HCT  41.9*  41.8*   PLT  194  205     BMP: Recent Labs      09/22/18   1249  09/23/18   0203   NA  137  140   K  4.1  3.9   CO2  25  24   BUN  9  8   CREATININE  1.1  1.2   LABGLOM  >60  >60   GLUCOSE  90  117*     BNP: No results for input(s): BNP in the last 72 hours.   PT/INR:   Recent Labs      09/22/18   1249   PROTIME  13.2   INR  1.01     APTT:  Recent Labs      09/22/18   1249   APTT  31.4     CARDIAC ENZYMES:  Recent Labs      09/22/18   1430  09/22/18   1654  09/22/18 2023   TROPONINI  0.83*  1.00*  0.95*     FASTING LIPID PANEL:  Lab Results   Component Value Date    HDL 34 09/23/2018    1811 Adreal 104 09/23/2018    TRIG 158 09/23/2018     LIVER PROFILE:  Recent Labs      09/22/18   1249   AST  73*   ALT  31   LABALBU  4.0           Current Inpatient Medications:     sodium chloride flush  10 mL Intravenous 2 times per day    aspirin  81 mg Oral Daily    mometasone-formoterol  2 puff Inhalation BID    cloNIDine  0.1 mg Oral BID    hydrochlorothiazide  25 mg Oral Daily    lisinopril  20 mg Oral Daily    metoprolol tartrate  25 mg Oral BID    pravastatin  40 mg Oral Nightly    tamsulosin  0.4 mg Oral Daily    sodium chloride flush  10 mL Intravenous 2 times per day    enoxaparin  1 mg/kg Subcutaneous BID       IV Infusions (if any):     sodium chloride 75 mL/hr at 09/24/18 3279    sodium chloride         Diagnostics:      EKG and or Telemetry:  which was personally reviewed me:  Sinus rhythm,   Pulse Readings from Last 3 Encounters:   09/24/18 62   07/19/18 87   05/02/18 67    bpm      ASSESSMENT:    CARDIOLOGY PROBLEMS ARE LISTED ABOVE; HOWEVER, THE FOLLOWING SPECIFIC CARDIAC PROBLEMS EITHER LISTED ABOVE OR NOT,  WERE ADDRESSED AND TREATED DURING THE HOSPITAL VISIT TODAY:                                                                                                 MEDICAL DECISION MAKING             Cardiac Specific Problem / Diagnosis  Discussion and Data Reviewed Diagnostic / Therapeutic  Procedures Ordered Management Options Selected           1.  ACS Initial presentation during this evaluation    9/22/2018  ACS SARAH RISK Score 4 (angina, cad> 50, hypertension, hypercholesteremia, cigarette use, asa), AUC indication 3, AUC score 8  Cardiac catheterization Continue current medications             2. CAD are improving   Review and summation of old records:    10/22/2004  Cath  Stent to LAD, normal LVFX  12/17/2004  Cath  Patent stent in the LAD, normal LVFX  6/9/2005  Cath  Patent stent in the LAD, normal LVFX  9/19/2005  cardiolite negative for myocardial ischemia, EF 40%  7/24/2006  Non Q wave MI  7/24/2006  Cath  ptca to the diag  11/6/2007  Cath  instent restenosis of the LAD stent, normal LVFX  11/7/2007  CABG x 2 LIMA-LAD, VG-diag Lynne Chery)  11/2/2011  Cath  Patent LIMA-LAD, patent VG-diag, normal LVFX  3/19/2013  lexiscan Positive for anterior and inferior myocardial ischemia, EF 52%, 6% ischemic myocardium on stress, intermediate risk findings, AUC indication 16, AUC score 7  8/19/2013  Cath  Occluded LIMA-LAD, 2.0x8 BMS to VG-D1, normal LVFX   9/22/2018  ACS SARAH RISK Score 4 (angina, cad> 50, hypertension, hypercholesteremia, cigarette use, asa), AUC indication 3, AUC score 8   9/24/18  Cath  Patent LIMA-LAD, occluded VG-diag, anterior lateral hypokinesis, EF 45%   As per the cardiac catheterization Continue current medications:                      3. hypertension show no change The blood pressure for the lastr 36 hours has been:  Systolic (14MVY), JGL:880 , Min:96 , SUT:072    Diastolic (09LFT), VFT:52, Min:70, Max:87    No Continue current medications:                 PLAN:    1. Continue present medications except for changes as noted above  2. Continue to monitor rhythm  3. Further orders per clinical course. 4. Maximal medical therapy           Discussed with patient and nursing.     Electronically signed by Korey Clayton MD on 9/24/18    [address] [facility]

## 2018-09-24 NOTE — PLAN OF CARE
Problem: Tobacco Use:  Goal: Will participate in inpatient tobacco-use cessation counseling  Will participate in inpatient tobacco-use cessation counseling   Outcome: Ongoing      Problem: Falls - Risk of:  Goal: Will remain free from falls  Will remain free from falls   Outcome: Ongoing    Goal: Absence of physical injury  Absence of physical injury   Outcome: Ongoing      Problem: Pain:  Goal: Pain level will decrease  Pain level will decrease   Outcome: Ongoing      Problem: Cardiac:  Goal: Ability to maintain vital signs within normal range will improve  Ability to maintain vital signs within normal range will improve   Outcome: Ongoing

## 2018-09-25 VITALS
HEART RATE: 73 BPM | RESPIRATION RATE: 16 BRPM | OXYGEN SATURATION: 98 % | HEIGHT: 69 IN | DIASTOLIC BLOOD PRESSURE: 71 MMHG | SYSTOLIC BLOOD PRESSURE: 102 MMHG | WEIGHT: 162.4 LBS | BODY MASS INDEX: 24.05 KG/M2 | TEMPERATURE: 97.2 F

## 2018-09-25 PROCEDURE — 6370000000 HC RX 637 (ALT 250 FOR IP): Performed by: INTERNAL MEDICINE

## 2018-09-25 PROCEDURE — 6360000002 HC RX W HCPCS: Performed by: INTERNAL MEDICINE

## 2018-09-25 PROCEDURE — 2580000003 HC RX 258: Performed by: INTERNAL MEDICINE

## 2018-09-25 PROCEDURE — 99238 HOSP IP/OBS DSCHRG MGMT 30/<: CPT | Performed by: INTERNAL MEDICINE

## 2018-09-25 RX ORDER — ISOSORBIDE MONONITRATE 30 MG/1
30 TABLET, EXTENDED RELEASE ORAL DAILY
Qty: 30 TABLET | Refills: 3 | Status: SHIPPED | OUTPATIENT
Start: 2018-09-25 | End: 2020-01-01 | Stop reason: ALTCHOICE

## 2018-09-25 RX ORDER — ISOSORBIDE MONONITRATE 30 MG/1
30 TABLET, EXTENDED RELEASE ORAL DAILY
Status: DISCONTINUED | OUTPATIENT
Start: 2018-09-25 | End: 2018-09-25 | Stop reason: HOSPADM

## 2018-09-25 RX ADMIN — LISINOPRIL 20 MG: 20 TABLET ORAL at 09:13

## 2018-09-25 RX ADMIN — CLONIDINE HYDROCHLORIDE 0.1 MG: 0.1 TABLET ORAL at 09:13

## 2018-09-25 RX ADMIN — ISOSORBIDE MONONITRATE 30 MG: 30 TABLET, EXTENDED RELEASE ORAL at 20:16

## 2018-09-25 RX ADMIN — ASPIRIN 81 MG CHEWABLE TABLET 81 MG: 81 TABLET CHEWABLE at 09:13

## 2018-09-25 RX ADMIN — MOMETASONE FUROATE AND FORMOTEROL FUMARATE DIHYDRATE 2 PUFF: 100; 5 AEROSOL RESPIRATORY (INHALATION) at 09:16

## 2018-09-25 RX ADMIN — TAMSULOSIN HYDROCHLORIDE 0.4 MG: 0.4 CAPSULE ORAL at 09:13

## 2018-09-25 RX ADMIN — HYDROCHLOROTHIAZIDE 25 MG: 25 TABLET ORAL at 09:13

## 2018-09-25 RX ADMIN — Medication 10 ML: at 09:14

## 2018-09-25 RX ADMIN — ENOXAPARIN SODIUM 80 MG: 80 INJECTION SUBCUTANEOUS at 09:14

## 2018-09-25 RX ADMIN — METOPROLOL TARTRATE 25 MG: 25 TABLET ORAL at 09:13

## 2018-09-25 ASSESSMENT — PAIN SCALES - GENERAL
PAINLEVEL_OUTOF10: 0

## 2018-09-26 NOTE — DISCHARGE SUMMARY
HGB 14.0 09/23/2018    HCT 41.8 09/23/2018    HCT 41.0 11/01/2011     09/23/2018     11/01/2011       Renal:    Lab Results   Component Value Date     09/23/2018     11/01/2011    K 3.9 09/23/2018    K 4.8 11/01/2011     09/23/2018     11/01/2011    CO2 24 09/23/2018    BUN 8 09/23/2018    CREATININE 1.2 09/23/2018    CREATININE 1.3 11/01/2011    CALCIUM 9.1 09/23/2018         Discharge Medications:     Current Discharge Medication List           Details   isosorbide mononitrate (IMDUR) 30 MG extended release tablet Take 1 tablet by mouth daily  Qty: 30 tablet, Refills: 3              Details   nitroGLYCERIN (NITROSTAT) 0.4 MG SL tablet Place 1 tablet under the tongue every 5 minutes as needed for Chest pain  Qty: 25 tablet, Refills: 0      aspirin 81 MG tablet Take 81 mg by mouth daily. ondansetron (ZOFRAN ODT) 4 MG disintegrating tablet Take 1 tablet by mouth every 8 hours as needed for Nausea or Vomiting  Qty: 15 tablet, Refills: 0      lisinopril (PRINIVIL) 20 MG tablet Take 1 tablet by mouth daily  Qty: 30 tablet, Refills: 0      cloNIDine (CATAPRES) 0.1 MG tablet Take 1 tablet by mouth 2 times daily If blood pressure greater than 195/95  Qty: 60 tablet, Refills: 0      metoprolol tartrate (LOPRESSOR) 25 MG tablet Take 1 tablet by mouth 2 times daily  Qty: 60 tablet, Refills: 3    Associated Diagnoses: CAD (coronary artery disease); HTN (hypertension)      rivaroxaban (XARELTO) 10 MG TABS tablet Take 1 tablet by mouth daily (with breakfast)  Qty: 30 tablet, Refills: 0      tamsulosin (FLOMAX) 0.4 MG capsule Take 1 capsule by mouth daily  Qty: 30 capsule, Refills: 0      pravastatin (PRAVACHOL) 40 MG tablet Take 1 tablet by mouth daily  Qty: 30 tablet, Refills: 3    Associated Diagnoses: CAD (coronary artery disease);  Hypercholesteremia      budesonide-formoterol (SYMBICORT) 160-4.5 MCG/ACT AERO Inhale 2 puffs into the lungs 2 times daily  Qty: 1 Inhaler, Refills: 0 albuterol sulfate HFA (VENTOLIN HFA) 108 (90 Base) MCG/ACT inhaler Inhale 2 puffs into the lungs every 6 hours as needed for Wheezing  Qty: 1 Inhaler, Refills: 0      naproxen (NAPROSYN) 500 MG tablet Take 1 tablet by mouth 2 times daily  Qty: 20 tablet, Refills: 0      hydrochlorothiazide (HYDRODIURIL) 25 MG tablet Take 12.5 mg by mouth daily       HYDROcodone-acetaminophen (NORCO) 5-325 MG per tablet Take 1 tablet by mouth every 8 hours as needed for Pain for up to 20 doses. Sedation precautions please  Qty: 20 tablet, Refills: 0                 Disposition:      1. Home  2. Follow up with cardiology as arranged  3.  Follow up with primary care provider as arranged      Electronically signed by Ketan Clay MD on 9/25/18

## 2018-11-08 ENCOUNTER — TELEPHONE (OUTPATIENT)
Dept: CARDIOLOGY | Age: 63
End: 2018-11-08

## 2019-08-28 ENCOUNTER — HOSPITAL ENCOUNTER (EMERGENCY)
Facility: HOSPITAL | Age: 64
Discharge: HOME | End: 2019-08-28
Attending: EMERGENCY MEDICINE | Admitting: EMERGENCY MEDICINE

## 2019-08-28 VITALS
RESPIRATION RATE: 19 BRPM | HEIGHT: 69 IN | WEIGHT: 160 LBS | OXYGEN SATURATION: 97 % | BODY MASS INDEX: 23.7 KG/M2 | TEMPERATURE: 99.2 F | DIASTOLIC BLOOD PRESSURE: 97 MMHG | HEART RATE: 101 BPM | SYSTOLIC BLOOD PRESSURE: 163 MMHG

## 2019-08-28 DIAGNOSIS — J06.9 VIRAL UPPER RESPIRATORY TRACT INFECTION: Primary | ICD-10-CM

## 2019-08-28 PROCEDURE — 99282 EMERGENCY DEPT VISIT SF MDM: CPT

## 2019-08-28 RX ORDER — NITROGLYCERIN 0.4 MG/1
0.4 TABLET SUBLINGUAL
COMMUNITY

## 2019-08-28 RX ORDER — ATORVASTATIN CALCIUM 40 MG/1
40 TABLET, FILM COATED ORAL DAILY
COMMUNITY

## 2019-08-28 RX ORDER — METHYLPREDNISOLONE 4 MG/1
TABLET ORAL
Qty: 1 EACH | Refills: 0 | Status: SHIPPED | OUTPATIENT
Start: 2019-08-28 | End: 2020-05-05

## 2019-08-28 RX ORDER — PROMETHAZINE HYDROCHLORIDE, PHENYLEPHRINE HYDROCHLORIDE AND CODEINE PHOSPHATE 6.25; 5; 1 MG/5ML; MG/5ML; MG/5ML
5-10 SOLUTION ORAL EVERY 4 HOURS PRN
Qty: 120 ML | Refills: 0 | Status: SHIPPED | OUTPATIENT
Start: 2019-08-28

## 2019-08-28 RX ORDER — ASPIRIN 81 MG/1
81 TABLET ORAL DAILY
COMMUNITY

## 2019-08-28 RX ORDER — TAMSULOSIN HYDROCHLORIDE 0.4 MG/1
1 CAPSULE ORAL DAILY
COMMUNITY

## 2019-08-28 RX ORDER — CLONIDINE HYDROCHLORIDE 0.2 MG/1
0.2 TABLET ORAL DAILY
COMMUNITY

## 2020-01-01 ENCOUNTER — APPOINTMENT (OUTPATIENT)
Dept: ULTRASOUND IMAGING | Age: 65
DRG: 207 | End: 2020-01-01
Payer: MEDICARE

## 2020-01-01 ENCOUNTER — APPOINTMENT (OUTPATIENT)
Dept: GENERAL RADIOLOGY | Age: 65
DRG: 207 | End: 2020-01-01
Payer: MEDICARE

## 2020-01-01 ENCOUNTER — HOSPITAL ENCOUNTER (EMERGENCY)
Age: 65
Discharge: HOME | DRG: 207 | End: 2020-05-30
Attending: EMERGENCY MEDICINE
Payer: MEDICARE

## 2020-01-01 ENCOUNTER — HOSPITAL ENCOUNTER (INPATIENT)
Age: 65
LOS: 12 days | Discharge: HOSPICE | DRG: 207 | End: 2020-06-12
Attending: EMERGENCY MEDICINE | Admitting: INTERNAL MEDICINE
Payer: MEDICARE

## 2020-01-01 ENCOUNTER — CARE COORDINATION (OUTPATIENT)
Dept: CARE COORDINATION | Age: 65
End: 2020-01-01

## 2020-01-01 VITALS
OXYGEN SATURATION: 95 % | HEIGHT: 69 IN | SYSTOLIC BLOOD PRESSURE: 111 MMHG | DIASTOLIC BLOOD PRESSURE: 67 MMHG | TEMPERATURE: 98.4 F | BODY MASS INDEX: 24.11 KG/M2 | WEIGHT: 162.8 LBS | HEART RATE: 80 BPM | RESPIRATION RATE: 16 BRPM

## 2020-01-01 VITALS
HEART RATE: 82 BPM | OXYGEN SATURATION: 99 % | TEMPERATURE: 98.1 F | DIASTOLIC BLOOD PRESSURE: 130 MMHG | RESPIRATION RATE: 23 BRPM | WEIGHT: 159 LBS | SYSTOLIC BLOOD PRESSURE: 170 MMHG | BODY MASS INDEX: 23.55 KG/M2 | HEIGHT: 69 IN

## 2020-01-01 LAB
ALBUMIN SERPL-MCNC: 1.6 G/DL (ref 3.5–5.2)
ALBUMIN SERPL-MCNC: 2 G/DL (ref 3.5–5.2)
ALBUMIN SERPL-MCNC: 2.1 G/DL (ref 3.5–5.2)
ALBUMIN SERPL-MCNC: 2.8 G/DL (ref 3.5–5.2)
ALBUMIN SERPL-MCNC: 3.3 G/DL (ref 3.5–5.2)
ALBUMIN SERPL-MCNC: 4.2 G/DL (ref 3.5–5.2)
ALBUMIN SERPL-MCNC: 4.2 G/DL (ref 3.5–5.2)
ALP BLD-CCNC: 125 U/L (ref 40–130)
ALP BLD-CCNC: 63 U/L (ref 40–130)
ALP BLD-CCNC: 68 U/L (ref 40–130)
ALP BLD-CCNC: 69 U/L (ref 40–130)
ALP BLD-CCNC: 90 U/L (ref 40–130)
ALP BLD-CCNC: 95 U/L (ref 40–130)
ALP BLD-CCNC: 96 U/L (ref 40–130)
ALT SERPL-CCNC: 11 U/L (ref 5–41)
ALT SERPL-CCNC: 13 U/L (ref 5–41)
ALT SERPL-CCNC: 14 U/L (ref 5–41)
ALT SERPL-CCNC: 19 U/L (ref 5–41)
ALT SERPL-CCNC: 23 U/L (ref 5–41)
ALT SERPL-CCNC: 44 U/L (ref 5–41)
ALT SERPL-CCNC: 45 U/L (ref 5–41)
ANION GAP SERPL CALCULATED.3IONS-SCNC: 10 MMOL/L (ref 7–19)
ANION GAP SERPL CALCULATED.3IONS-SCNC: 11 MMOL/L (ref 7–19)
ANION GAP SERPL CALCULATED.3IONS-SCNC: 12 MMOL/L (ref 7–19)
ANION GAP SERPL CALCULATED.3IONS-SCNC: 14 MMOL/L (ref 7–19)
ANION GAP SERPL CALCULATED.3IONS-SCNC: 14 MMOL/L (ref 7–19)
ANION GAP SERPL CALCULATED.3IONS-SCNC: 15 MMOL/L (ref 7–19)
ANION GAP SERPL CALCULATED.3IONS-SCNC: 15 MMOL/L (ref 7–19)
ANION GAP SERPL CALCULATED.3IONS-SCNC: 16 MMOL/L (ref 7–19)
ANION GAP SERPL CALCULATED.3IONS-SCNC: 16 MMOL/L (ref 7–19)
ANION GAP SERPL CALCULATED.3IONS-SCNC: 17 MMOL/L (ref 7–19)
ANION GAP SERPL CALCULATED.3IONS-SCNC: 17 MMOL/L (ref 7–19)
ANION GAP SERPL CALCULATED.3IONS-SCNC: 18 MMOL/L (ref 7–19)
ANION GAP SERPL CALCULATED.3IONS-SCNC: 21 MMOL/L (ref 7–19)
APTT: 31.5 SEC (ref 26–36.2)
APTT: 34 SEC (ref 26–36.2)
AST SERPL-CCNC: 105 U/L (ref 5–40)
AST SERPL-CCNC: 16 U/L (ref 5–40)
AST SERPL-CCNC: 17 U/L (ref 5–40)
AST SERPL-CCNC: 26 U/L (ref 5–40)
AST SERPL-CCNC: 27 U/L (ref 5–40)
AST SERPL-CCNC: 53 U/L (ref 5–40)
AST SERPL-CCNC: 84 U/L (ref 5–40)
BACTERIA: NEGATIVE /HPF
BASE EXCESS ARTERIAL: -0.7 MMOL/L (ref -2–2)
BASE EXCESS ARTERIAL: -1.3 MMOL/L (ref -2–2)
BASE EXCESS ARTERIAL: -1.7 MMOL/L (ref -2–2)
BASE EXCESS ARTERIAL: -1.8 MMOL/L (ref -2–2)
BASE EXCESS ARTERIAL: -11.6 MMOL/L (ref -2–2)
BASE EXCESS ARTERIAL: -2.3 MMOL/L (ref -2–2)
BASE EXCESS ARTERIAL: -2.4 MMOL/L (ref -2–2)
BASE EXCESS ARTERIAL: -2.5 MMOL/L (ref -2–2)
BASE EXCESS ARTERIAL: -2.9 MMOL/L (ref -2–2)
BASE EXCESS ARTERIAL: -3 MMOL/L (ref -2–2)
BASE EXCESS ARTERIAL: -6.1 MMOL/L (ref -2–2)
BASOPHILS ABSOLUTE: 0.1 K/UL (ref 0–0.2)
BASOPHILS RELATIVE PERCENT: 0.7 % (ref 0–1)
BILIRUB SERPL-MCNC: 0.8 MG/DL (ref 0.2–1.2)
BILIRUB SERPL-MCNC: 1 MG/DL (ref 0.2–1.2)
BILIRUB SERPL-MCNC: 1 MG/DL (ref 0.2–1.2)
BILIRUB SERPL-MCNC: 1.1 MG/DL (ref 0.2–1.2)
BILIRUB SERPL-MCNC: 1.1 MG/DL (ref 0.2–1.2)
BILIRUB SERPL-MCNC: 1.8 MG/DL (ref 0.2–1.2)
BILIRUB SERPL-MCNC: 1.9 MG/DL (ref 0.2–1.2)
BILIRUBIN URINE: NEGATIVE
BLOOD CULTURE, ROUTINE: NORMAL
BLOOD, URINE: ABNORMAL
BUN BLDV-MCNC: 10 MG/DL (ref 8–23)
BUN BLDV-MCNC: 100 MG/DL (ref 8–23)
BUN BLDV-MCNC: 104 MG/DL (ref 8–23)
BUN BLDV-MCNC: 106 MG/DL (ref 8–23)
BUN BLDV-MCNC: 112 MG/DL (ref 8–23)
BUN BLDV-MCNC: 20 MG/DL (ref 8–23)
BUN BLDV-MCNC: 29 MG/DL (ref 8–23)
BUN BLDV-MCNC: 38 MG/DL (ref 8–23)
BUN BLDV-MCNC: 66 MG/DL (ref 8–23)
BUN BLDV-MCNC: 72 MG/DL (ref 8–23)
BUN BLDV-MCNC: 8 MG/DL (ref 8–23)
BUN BLDV-MCNC: 80 MG/DL (ref 8–23)
BUN BLDV-MCNC: 84 MG/DL (ref 8–23)
BUN BLDV-MCNC: 90 MG/DL (ref 8–23)
BUN BLDV-MCNC: 99 MG/DL (ref 8–23)
CALCIUM SERPL-MCNC: 7.1 MG/DL (ref 8.8–10.2)
CALCIUM SERPL-MCNC: 8.3 MG/DL (ref 8.8–10.2)
CALCIUM SERPL-MCNC: 8.4 MG/DL (ref 8.8–10.2)
CALCIUM SERPL-MCNC: 8.5 MG/DL (ref 8.8–10.2)
CALCIUM SERPL-MCNC: 8.6 MG/DL (ref 8.8–10.2)
CALCIUM SERPL-MCNC: 8.8 MG/DL (ref 8.8–10.2)
CALCIUM SERPL-MCNC: 8.8 MG/DL (ref 8.8–10.2)
CALCIUM SERPL-MCNC: 8.9 MG/DL (ref 8.8–10.2)
CALCIUM SERPL-MCNC: 9 MG/DL (ref 8.8–10.2)
CALCIUM SERPL-MCNC: 9.1 MG/DL (ref 8.8–10.2)
CALCIUM SERPL-MCNC: 9.3 MG/DL (ref 8.8–10.2)
CALCIUM SERPL-MCNC: 9.4 MG/DL (ref 8.8–10.2)
CALCIUM SERPL-MCNC: 9.4 MG/DL (ref 8.8–10.2)
CARBOXYHEMOGLOBIN ARTERIAL: 1.7 % (ref 0–5)
CARBOXYHEMOGLOBIN ARTERIAL: 2.1 % (ref 0–5)
CARBOXYHEMOGLOBIN ARTERIAL: 2.3 % (ref 0–5)
CARBOXYHEMOGLOBIN ARTERIAL: 2.5 % (ref 0–5)
CARBOXYHEMOGLOBIN ARTERIAL: 2.5 % (ref 0–5)
CARBOXYHEMOGLOBIN ARTERIAL: 2.8 % (ref 0–5)
CARBOXYHEMOGLOBIN ARTERIAL: 2.9 % (ref 0–5)
CHLORIDE BLD-SCNC: 101 MMOL/L (ref 98–111)
CHLORIDE BLD-SCNC: 101 MMOL/L (ref 98–111)
CHLORIDE BLD-SCNC: 103 MMOL/L (ref 98–111)
CHLORIDE BLD-SCNC: 104 MMOL/L (ref 98–111)
CHLORIDE BLD-SCNC: 105 MMOL/L (ref 98–111)
CHLORIDE BLD-SCNC: 107 MMOL/L (ref 98–111)
CHLORIDE BLD-SCNC: 107 MMOL/L (ref 98–111)
CHLORIDE BLD-SCNC: 110 MMOL/L (ref 98–111)
CHLORIDE BLD-SCNC: 111 MMOL/L (ref 98–111)
CHLORIDE BLD-SCNC: 114 MMOL/L (ref 98–111)
CHLORIDE BLD-SCNC: 117 MMOL/L (ref 98–111)
CHLORIDE BLD-SCNC: 117 MMOL/L (ref 98–111)
CHLORIDE BLD-SCNC: 119 MMOL/L (ref 98–111)
CHLORIDE BLD-SCNC: 97 MMOL/L (ref 98–111)
CHLORIDE BLD-SCNC: 97 MMOL/L (ref 98–111)
CLARITY: ABNORMAL
CO2: 17 MMOL/L (ref 22–29)
CO2: 18 MMOL/L (ref 22–29)
CO2: 18 MMOL/L (ref 22–29)
CO2: 19 MMOL/L (ref 22–29)
CO2: 19 MMOL/L (ref 22–29)
CO2: 20 MMOL/L (ref 22–29)
CO2: 21 MMOL/L (ref 22–29)
CO2: 22 MMOL/L (ref 22–29)
CO2: 23 MMOL/L (ref 22–29)
CO2: 24 MMOL/L (ref 22–29)
CO2: 25 MMOL/L (ref 22–29)
COLOR: YELLOW
CREAT SERPL-MCNC: 1.2 MG/DL (ref 0.5–1.2)
CREAT SERPL-MCNC: 1.4 MG/DL (ref 0.5–1.2)
CREAT SERPL-MCNC: 2.1 MG/DL (ref 0.5–1.2)
CREAT SERPL-MCNC: 2.8 MG/DL (ref 0.5–1.2)
CREAT SERPL-MCNC: 2.8 MG/DL (ref 0.5–1.2)
CREAT SERPL-MCNC: 2.9 MG/DL (ref 0.5–1.2)
CREAT SERPL-MCNC: 3.1 MG/DL (ref 0.5–1.2)
CREAT SERPL-MCNC: 3.2 MG/DL (ref 0.5–1.2)
CREAT SERPL-MCNC: 3.3 MG/DL (ref 0.5–1.2)
CREAT SERPL-MCNC: 3.4 MG/DL (ref 0.5–1.2)
CREAT SERPL-MCNC: 3.5 MG/DL (ref 0.5–1.2)
CREAT SERPL-MCNC: 4.3 MG/DL (ref 0.5–1.2)
CREAT SERPL-MCNC: 4.7 MG/DL (ref 0.5–1.2)
CREATININE URINE: 239.5 MG/DL (ref 4.2–622)
CREATININE URINE: 31.6 MG/DL (ref 4.2–622)
CULTURE, BLOOD 2: NORMAL
CULTURE, RESPIRATORY: ABNORMAL
EKG P AXIS: 64 DEGREES
EKG P AXIS: 65 DEGREES
EKG P AXIS: 69 DEGREES
EKG P-R INTERVAL: 114 MS
EKG P-R INTERVAL: 130 MS
EKG P-R INTERVAL: 134 MS
EKG Q-T INTERVAL: 276 MS
EKG Q-T INTERVAL: 300 MS
EKG Q-T INTERVAL: 382 MS
EKG QRS DURATION: 76 MS
EKG QRS DURATION: 86 MS
EKG QRS DURATION: 86 MS
EKG QTC CALCULATION (BAZETT): 370 MS
EKG QTC CALCULATION (BAZETT): 402 MS
EKG QTC CALCULATION (BAZETT): 429 MS
EKG T AXIS: -115 DEGREES
EKG T AXIS: 118 DEGREES
EKG T AXIS: 153 DEGREES
EOSINOPHILS ABSOLUTE: 0.2 K/UL (ref 0–0.6)
EOSINOPHILS RELATIVE PERCENT: 1.8 % (ref 0–5)
EPITHELIAL CELLS, UA: 2 /HPF (ref 0–5)
GFR NON-AFRICAN AMERICAN: 13
GFR NON-AFRICAN AMERICAN: 14
GFR NON-AFRICAN AMERICAN: 18
GFR NON-AFRICAN AMERICAN: 18
GFR NON-AFRICAN AMERICAN: 19
GFR NON-AFRICAN AMERICAN: 20
GFR NON-AFRICAN AMERICAN: 20
GFR NON-AFRICAN AMERICAN: 22
GFR NON-AFRICAN AMERICAN: 23
GFR NON-AFRICAN AMERICAN: 23
GFR NON-AFRICAN AMERICAN: 32
GFR NON-AFRICAN AMERICAN: 51
GFR NON-AFRICAN AMERICAN: >60
GLUCOSE BLD-MCNC: 104 MG/DL (ref 74–109)
GLUCOSE BLD-MCNC: 111 MG/DL (ref 74–109)
GLUCOSE BLD-MCNC: 116 MG/DL (ref 74–109)
GLUCOSE BLD-MCNC: 119 MG/DL (ref 74–109)
GLUCOSE BLD-MCNC: 122 MG/DL (ref 70–99)
GLUCOSE BLD-MCNC: 126 MG/DL (ref 74–109)
GLUCOSE BLD-MCNC: 128 MG/DL (ref 74–109)
GLUCOSE BLD-MCNC: 129 MG/DL (ref 70–99)
GLUCOSE BLD-MCNC: 129 MG/DL (ref 70–99)
GLUCOSE BLD-MCNC: 129 MG/DL (ref 74–109)
GLUCOSE BLD-MCNC: 134 MG/DL (ref 74–109)
GLUCOSE BLD-MCNC: 146 MG/DL (ref 74–109)
GLUCOSE BLD-MCNC: 147 MG/DL (ref 74–109)
GLUCOSE BLD-MCNC: 152 MG/DL (ref 74–109)
GLUCOSE BLD-MCNC: 186 MG/DL (ref 74–109)
GLUCOSE BLD-MCNC: 231 MG/DL (ref 74–109)
GLUCOSE BLD-MCNC: 87 MG/DL (ref 74–109)
GLUCOSE BLD-MCNC: 97 MG/DL (ref 74–109)
GLUCOSE URINE: NEGATIVE MG/DL
GRAM STAIN RESULT: ABNORMAL
HCO3 ARTERIAL: 19.6 MMOL/L (ref 22–26)
HCO3 ARTERIAL: 20.6 MMOL/L (ref 22–26)
HCO3 ARTERIAL: 20.9 MMOL/L (ref 22–26)
HCO3 ARTERIAL: 21.4 MMOL/L (ref 22–26)
HCO3 ARTERIAL: 21.6 MMOL/L (ref 22–26)
HCO3 ARTERIAL: 21.6 MMOL/L (ref 22–26)
HCO3 ARTERIAL: 21.9 MMOL/L (ref 22–26)
HCO3 ARTERIAL: 22.5 MMOL/L (ref 22–26)
HCO3 ARTERIAL: 22.7 MMOL/L (ref 22–26)
HCO3 ARTERIAL: 23 MMOL/L (ref 22–26)
HCO3 ARTERIAL: 23.2 MMOL/L (ref 22–26)
HCT VFR BLD CALC: 29.5 % (ref 42–52)
HCT VFR BLD CALC: 34.8 % (ref 42–52)
HCT VFR BLD CALC: 35.6 % (ref 42–52)
HCT VFR BLD CALC: 35.9 % (ref 42–52)
HCT VFR BLD CALC: 42.1 % (ref 42–52)
HCT VFR BLD CALC: 43.2 % (ref 42–52)
HCT VFR BLD CALC: 45.4 % (ref 42–52)
HCT VFR BLD CALC: 48 % (ref 42–52)
HEMOGLOBIN, ART, EXTENDED: 10.1 G/DL (ref 14–18)
HEMOGLOBIN, ART, EXTENDED: 11.2 G/DL (ref 14–18)
HEMOGLOBIN, ART, EXTENDED: 11.7 G/DL (ref 14–18)
HEMOGLOBIN, ART, EXTENDED: 11.9 G/DL (ref 14–18)
HEMOGLOBIN, ART, EXTENDED: 11.9 G/DL (ref 14–18)
HEMOGLOBIN, ART, EXTENDED: 12.4 G/DL (ref 14–18)
HEMOGLOBIN, ART, EXTENDED: 12.5 G/DL (ref 14–18)
HEMOGLOBIN, ART, EXTENDED: 12.6 G/DL (ref 14–18)
HEMOGLOBIN, ART, EXTENDED: 15.1 G/DL (ref 14–18)
HEMOGLOBIN, ART, EXTENDED: 16 G/DL (ref 14–18)
HEMOGLOBIN, ART, EXTENDED: 16.1 G/DL (ref 14–18)
HEMOGLOBIN: 10.1 G/DL (ref 14–18)
HEMOGLOBIN: 12 G/DL (ref 14–18)
HEMOGLOBIN: 12 G/DL (ref 14–18)
HEMOGLOBIN: 12.1 G/DL (ref 14–18)
HEMOGLOBIN: 13.9 G/DL (ref 14–18)
HEMOGLOBIN: 14.5 G/DL (ref 14–18)
HEMOGLOBIN: 15.5 G/DL (ref 14–18)
HEMOGLOBIN: 15.6 G/DL (ref 14–18)
HYALINE CASTS: 6 /HPF (ref 0–8)
IMMATURE GRANULOCYTES #: 0.1 K/UL
INR BLD: 1.03 (ref 0.88–1.18)
INR BLD: 1.06 (ref 0.88–1.18)
KETONES, URINE: NEGATIVE MG/DL
LACTIC ACID: 1.6 MMOL/L (ref 0.5–1.9)
LEUKOCYTE ESTERASE, URINE: ABNORMAL
LV EF: 15 %
LVEF MODALITY: NORMAL
LYMPHOCYTES ABSOLUTE: 2.8 K/UL (ref 1.1–4.5)
LYMPHOCYTES RELATIVE PERCENT: 31.4 % (ref 20–40)
MAGNESIUM: 1.9 MG/DL (ref 1.6–2.4)
MAGNESIUM: 2 MG/DL (ref 1.6–2.4)
MAGNESIUM: 2 MG/DL (ref 1.6–2.4)
MAGNESIUM: 2.1 MG/DL (ref 1.6–2.4)
MAGNESIUM: 2.3 MG/DL (ref 1.6–2.4)
MCH RBC QN AUTO: 28.5 PG (ref 27–31)
MCH RBC QN AUTO: 28.5 PG (ref 27–31)
MCH RBC QN AUTO: 28.6 PG (ref 27–31)
MCH RBC QN AUTO: 28.8 PG (ref 27–31)
MCH RBC QN AUTO: 28.9 PG (ref 27–31)
MCH RBC QN AUTO: 29.1 PG (ref 27–31)
MCH RBC QN AUTO: 29.2 PG (ref 27–31)
MCH RBC QN AUTO: 29.5 PG (ref 27–31)
MCHC RBC AUTO-ENTMCNC: 32.3 G/DL (ref 33–37)
MCHC RBC AUTO-ENTMCNC: 33 G/DL (ref 33–37)
MCHC RBC AUTO-ENTMCNC: 33.4 G/DL (ref 33–37)
MCHC RBC AUTO-ENTMCNC: 33.6 G/DL (ref 33–37)
MCHC RBC AUTO-ENTMCNC: 34 G/DL (ref 33–37)
MCHC RBC AUTO-ENTMCNC: 34.2 G/DL (ref 33–37)
MCHC RBC AUTO-ENTMCNC: 34.4 G/DL (ref 33–37)
MCHC RBC AUTO-ENTMCNC: 34.5 G/DL (ref 33–37)
MCV RBC AUTO: 83.9 FL (ref 80–94)
MCV RBC AUTO: 85.2 FL (ref 80–94)
MCV RBC AUTO: 85.2 FL (ref 80–94)
MCV RBC AUTO: 85.3 FL (ref 80–94)
MCV RBC AUTO: 85.5 FL (ref 80–94)
MCV RBC AUTO: 86.3 FL (ref 80–94)
MCV RBC AUTO: 86.9 FL (ref 80–94)
MCV RBC AUTO: 88.2 FL (ref 80–94)
METHEMOGLOBIN ARTERIAL: 0.7 %
METHEMOGLOBIN ARTERIAL: 1 %
METHEMOGLOBIN ARTERIAL: 1.1 %
METHEMOGLOBIN ARTERIAL: 1.1 %
METHEMOGLOBIN ARTERIAL: 1.3 %
METHEMOGLOBIN ARTERIAL: 1.4 %
METHEMOGLOBIN ARTERIAL: 1.4 %
METHEMOGLOBIN ARTERIAL: 1.6 %
METHEMOGLOBIN ARTERIAL: 1.6 %
METHEMOGLOBIN ARTERIAL: 1.9 %
METHEMOGLOBIN ARTERIAL: 2 %
MICROALBUMIN UR-MCNC: 31.9 MG/DL (ref 0–19)
MICROALBUMIN/CREAT UR-RTO: 133.2 MG/G
MONOCYTES ABSOLUTE: 0.7 K/UL (ref 0–0.9)
MONOCYTES RELATIVE PERCENT: 7.4 % (ref 0–10)
NEUTROPHILS ABSOLUTE: 5 K/UL (ref 1.5–7.5)
NEUTROPHILS RELATIVE PERCENT: 57.2 % (ref 50–65)
NITRITE, URINE: NEGATIVE
O2 CONTENT ARTERIAL: 13.6 ML/DL
O2 CONTENT ARTERIAL: 14.8 ML/DL
O2 CONTENT ARTERIAL: 15.5 ML/DL
O2 CONTENT ARTERIAL: 15.6 ML/DL
O2 CONTENT ARTERIAL: 15.8 ML/DL
O2 CONTENT ARTERIAL: 16.5 ML/DL
O2 CONTENT ARTERIAL: 16.6 ML/DL
O2 CONTENT ARTERIAL: 17 ML/DL
O2 CONTENT ARTERIAL: 19.6 ML/DL
O2 CONTENT ARTERIAL: 20.5 ML/DL
O2 CONTENT ARTERIAL: 22.5 ML/DL
O2 SAT, ARTERIAL: 90.6 %
O2 SAT, ARTERIAL: 92.5 %
O2 SAT, ARTERIAL: 93 %
O2 SAT, ARTERIAL: 93.6 %
O2 SAT, ARTERIAL: 93.6 %
O2 SAT, ARTERIAL: 93.7 %
O2 SAT, ARTERIAL: 93.8 %
O2 SAT, ARTERIAL: 94 %
O2 SAT, ARTERIAL: 94.9 %
O2 SAT, ARTERIAL: 96.6 %
O2 SAT, ARTERIAL: 96.7 %
O2 THERAPY: ABNORMAL
ORGANISM: ABNORMAL
ORGANISM: ABNORMAL
PARATHYROID HORMONE INTACT: 183.8 PG/ML (ref 15–65)
PCO2 ARTERIAL: 31 MMHG (ref 35–45)
PCO2 ARTERIAL: 33 MMHG (ref 35–45)
PCO2 ARTERIAL: 34 MMHG (ref 35–45)
PCO2 ARTERIAL: 34 MMHG (ref 35–45)
PCO2 ARTERIAL: 35 MMHG (ref 35–45)
PCO2 ARTERIAL: 35 MMHG (ref 35–45)
PCO2 ARTERIAL: 38 MMHG (ref 35–45)
PCO2 ARTERIAL: 55 MMHG (ref 35–45)
PCO2 ARTERIAL: 66 MMHG (ref 35–45)
PDW BLD-RTO: 14.6 % (ref 11.5–14.5)
PDW BLD-RTO: 14.7 % (ref 11.5–14.5)
PDW BLD-RTO: 15.3 % (ref 11.5–14.5)
PDW BLD-RTO: 15.6 % (ref 11.5–14.5)
PDW BLD-RTO: 15.7 % (ref 11.5–14.5)
PDW BLD-RTO: 15.9 % (ref 11.5–14.5)
PDW BLD-RTO: 16.1 % (ref 11.5–14.5)
PDW BLD-RTO: 16.4 % (ref 11.5–14.5)
PERFORMED ON: ABNORMAL
PH ARTERIAL: 7.08 (ref 7.35–7.45)
PH ARTERIAL: 7.22 (ref 7.35–7.45)
PH ARTERIAL: 7.39 (ref 7.35–7.45)
PH ARTERIAL: 7.41 (ref 7.35–7.45)
PH ARTERIAL: 7.42 (ref 7.35–7.45)
PH ARTERIAL: 7.42 (ref 7.35–7.45)
PH ARTERIAL: 7.43 (ref 7.35–7.45)
PH UA: 5 (ref 5–8)
PLATELET # BLD: 121 K/UL (ref 130–400)
PLATELET # BLD: 124 K/UL (ref 130–400)
PLATELET # BLD: 127 K/UL (ref 130–400)
PLATELET # BLD: 130 K/UL (ref 130–400)
PLATELET # BLD: 150 K/UL (ref 130–400)
PLATELET # BLD: 161 K/UL (ref 130–400)
PLATELET # BLD: 167 K/UL (ref 130–400)
PLATELET # BLD: 206 K/UL (ref 130–400)
PMV BLD AUTO: 10.9 FL (ref 9.4–12.4)
PMV BLD AUTO: 11.6 FL (ref 9.4–12.4)
PMV BLD AUTO: 11.9 FL (ref 9.4–12.4)
PMV BLD AUTO: 12 FL (ref 9.4–12.4)
PMV BLD AUTO: 12.5 FL (ref 9.4–12.4)
PMV BLD AUTO: 12.6 FL (ref 9.4–12.4)
PMV BLD AUTO: 12.7 FL (ref 9.4–12.4)
PMV BLD AUTO: 12.9 FL (ref 9.4–12.4)
PO2 ARTERIAL: 104 MMHG (ref 80–100)
PO2 ARTERIAL: 338 MMHG (ref 80–100)
PO2 ARTERIAL: 68 MMHG (ref 80–100)
PO2 ARTERIAL: 70 MMHG (ref 80–100)
PO2 ARTERIAL: 72 MMHG (ref 80–100)
PO2 ARTERIAL: 74 MMHG (ref 80–100)
PO2 ARTERIAL: 74 MMHG (ref 80–100)
PO2 ARTERIAL: 76 MMHG (ref 80–100)
PO2 ARTERIAL: 87 MMHG (ref 80–100)
POTASSIUM SERPL-SCNC: 3.3 MMOL/L (ref 3.5–5)
POTASSIUM SERPL-SCNC: 3.4 MMOL/L (ref 3.5–5)
POTASSIUM SERPL-SCNC: 3.6 MMOL/L (ref 3.5–5)
POTASSIUM SERPL-SCNC: 3.8 MMOL/L (ref 3.5–5)
POTASSIUM SERPL-SCNC: 3.9 MMOL/L (ref 3.5–5)
POTASSIUM SERPL-SCNC: 4 MMOL/L (ref 3.5–5)
POTASSIUM SERPL-SCNC: 4.1 MMOL/L (ref 3.5–5)
POTASSIUM SERPL-SCNC: 4.3 MMOL/L (ref 3.5–5)
POTASSIUM SERPL-SCNC: 4.3 MMOL/L (ref 3.5–5)
POTASSIUM SERPL-SCNC: 4.4 MMOL/L (ref 3.5–5)
POTASSIUM SERPL-SCNC: 4.5 MMOL/L (ref 3.5–5)
POTASSIUM SERPL-SCNC: 4.5 MMOL/L (ref 3.5–5)
POTASSIUM SERPL-SCNC: 4.8 MMOL/L (ref 3.5–5)
POTASSIUM, WHOLE BLOOD: 3.2
POTASSIUM, WHOLE BLOOD: 3.2
POTASSIUM, WHOLE BLOOD: 3.4
POTASSIUM, WHOLE BLOOD: 3.6
POTASSIUM, WHOLE BLOOD: 3.8
POTASSIUM, WHOLE BLOOD: 3.9
POTASSIUM, WHOLE BLOOD: 4
POTASSIUM, WHOLE BLOOD: 4.1
POTASSIUM, WHOLE BLOOD: 4.2
PRO-BNP: 2181 PG/ML (ref 0–900)
PRO-BNP: 2528 PG/ML (ref 0–900)
PRO-BNP: 3157 PG/ML (ref 0–900)
PRO-BNP: 4776 PG/ML (ref 0–900)
PRO-BNP: 5096 PG/ML (ref 0–900)
PROTEIN PROTEIN: 24 MG/DL (ref 15–45)
PROTEIN UA: ABNORMAL MG/DL
PROTHROMBIN TIME: 13.4 SEC (ref 12–14.6)
PROTHROMBIN TIME: 13.7 SEC (ref 12–14.6)
RBC # BLD: 3.46 M/UL (ref 4.7–6.1)
RBC # BLD: 4.07 M/UL (ref 4.7–6.1)
RBC # BLD: 4.13 M/UL (ref 4.7–6.1)
RBC # BLD: 4.18 M/UL (ref 4.7–6.1)
RBC # BLD: 4.88 M/UL (ref 4.7–6.1)
RBC # BLD: 5.07 M/UL (ref 4.7–6.1)
RBC # BLD: 5.41 M/UL (ref 4.7–6.1)
RBC # BLD: 5.44 M/UL (ref 4.7–6.1)
RBC UA: 3 /HPF (ref 0–4)
REASON FOR REJECTION: NORMAL
REJECTED TEST: NORMAL
SODIUM BLD-SCNC: 137 MMOL/L (ref 136–145)
SODIUM BLD-SCNC: 138 MMOL/L (ref 136–145)
SODIUM BLD-SCNC: 139 MMOL/L (ref 136–145)
SODIUM BLD-SCNC: 139 MMOL/L (ref 136–145)
SODIUM BLD-SCNC: 140 MMOL/L (ref 136–145)
SODIUM BLD-SCNC: 141 MMOL/L (ref 136–145)
SODIUM BLD-SCNC: 143 MMOL/L (ref 136–145)
SODIUM BLD-SCNC: 144 MMOL/L (ref 136–145)
SODIUM BLD-SCNC: 146 MMOL/L (ref 136–145)
SODIUM BLD-SCNC: 150 MMOL/L (ref 136–145)
SODIUM BLD-SCNC: 151 MMOL/L (ref 136–145)
SODIUM BLD-SCNC: 152 MMOL/L (ref 136–145)
SODIUM BLD-SCNC: 154 MMOL/L (ref 136–145)
SODIUM URINE: 107 MMOL/L
SPECIFIC GRAVITY UA: 1.01 (ref 1–1.03)
TOTAL PROTEIN: 5.1 G/DL (ref 6.6–8.7)
TOTAL PROTEIN: 5.9 G/DL (ref 6.6–8.7)
TOTAL PROTEIN: 6.1 G/DL (ref 6.6–8.7)
TOTAL PROTEIN: 6.3 G/DL (ref 6.6–8.7)
TOTAL PROTEIN: 6.5 G/DL (ref 6.6–8.7)
TOTAL PROTEIN: 6.8 G/DL (ref 6.6–8.7)
TOTAL PROTEIN: 7.6 G/DL (ref 6.6–8.7)
TROPONIN: 0.03 NG/ML (ref 0–0.03)
TROPONIN: 0.03 NG/ML (ref 0–0.03)
TROPONIN: <0.01 NG/ML (ref 0–0.03)
TROPONIN: <0.01 NG/ML (ref 0–0.03)
URIC ACID, SERUM: 6.9 MG/DL (ref 3.4–7)
UROBILINOGEN, URINE: 0.2 E.U./DL
VITAMIN D 25-HYDROXY: 9.6 NG/ML
WBC # BLD: 13.9 K/UL (ref 4.8–10.8)
WBC # BLD: 15.2 K/UL (ref 4.8–10.8)
WBC # BLD: 18.2 K/UL (ref 4.8–10.8)
WBC # BLD: 18.8 K/UL (ref 4.8–10.8)
WBC # BLD: 20.7 K/UL (ref 4.8–10.8)
WBC # BLD: 23.8 K/UL (ref 4.8–10.8)
WBC # BLD: 27.6 K/UL (ref 4.8–10.8)
WBC # BLD: 8.8 K/UL (ref 4.8–10.8)
WBC UA: 5 /HPF (ref 0–5)
YEAST: ABNORMAL /HPF

## 2020-01-01 PROCEDURE — 6370000000 HC RX 637 (ALT 250 FOR IP): Performed by: INTERNAL MEDICINE

## 2020-01-01 PROCEDURE — 2580000003 HC RX 258: Performed by: INTERNAL MEDICINE

## 2020-01-01 PROCEDURE — 2700000000 HC OXYGEN THERAPY PER DAY

## 2020-01-01 PROCEDURE — 36600 WITHDRAWAL OF ARTERIAL BLOOD: CPT

## 2020-01-01 PROCEDURE — 6360000002 HC RX W HCPCS: Performed by: INTERNAL MEDICINE

## 2020-01-01 PROCEDURE — 85027 COMPLETE CBC AUTOMATED: CPT

## 2020-01-01 PROCEDURE — 94003 VENT MGMT INPAT SUBQ DAY: CPT

## 2020-01-01 PROCEDURE — 2100000000 HC CCU R&B

## 2020-01-01 PROCEDURE — 99232 SBSQ HOSP IP/OBS MODERATE 35: CPT | Performed by: INTERNAL MEDICINE

## 2020-01-01 PROCEDURE — 84484 ASSAY OF TROPONIN QUANT: CPT

## 2020-01-01 PROCEDURE — 71045 X-RAY EXAM CHEST 1 VIEW: CPT

## 2020-01-01 PROCEDURE — 99233 SBSQ HOSP IP/OBS HIGH 50: CPT | Performed by: NURSE PRACTITIONER

## 2020-01-01 PROCEDURE — 0BH17EZ INSERTION OF ENDOTRACHEAL AIRWAY INTO TRACHEA, VIA NATURAL OR ARTIFICIAL OPENING: ICD-10-PCS | Performed by: EMERGENCY MEDICINE

## 2020-01-01 PROCEDURE — 2580000003 HC RX 258: Performed by: EMERGENCY MEDICINE

## 2020-01-01 PROCEDURE — 2500000003 HC RX 250 WO HCPCS: Performed by: INTERNAL MEDICINE

## 2020-01-01 PROCEDURE — 80048 BASIC METABOLIC PNL TOTAL CA: CPT

## 2020-01-01 PROCEDURE — 85730 THROMBOPLASTIN TIME PARTIAL: CPT

## 2020-01-01 PROCEDURE — 94002 VENT MGMT INPAT INIT DAY: CPT

## 2020-01-01 PROCEDURE — 6360000002 HC RX W HCPCS: Performed by: HOSPITALIST

## 2020-01-01 PROCEDURE — 82803 BLOOD GASES ANY COMBINATION: CPT

## 2020-01-01 PROCEDURE — 99356 PR PROLONGED SVC I/P OR OBS SETTING 1ST HOUR: CPT | Performed by: NURSE PRACTITIONER

## 2020-01-01 PROCEDURE — 84132 ASSAY OF SERUM POTASSIUM: CPT

## 2020-01-01 PROCEDURE — 87070 CULTURE OTHR SPECIMN AEROBIC: CPT

## 2020-01-01 PROCEDURE — 36415 COLL VENOUS BLD VENIPUNCTURE: CPT

## 2020-01-01 PROCEDURE — 81001 URINALYSIS AUTO W/SCOPE: CPT

## 2020-01-01 PROCEDURE — 2500000003 HC RX 250 WO HCPCS

## 2020-01-01 PROCEDURE — 94640 AIRWAY INHALATION TREATMENT: CPT

## 2020-01-01 PROCEDURE — 82043 UR ALBUMIN QUANTITATIVE: CPT

## 2020-01-01 PROCEDURE — 51702 INSERT TEMP BLADDER CATH: CPT

## 2020-01-01 PROCEDURE — 85610 PROTHROMBIN TIME: CPT

## 2020-01-01 PROCEDURE — 99233 SBSQ HOSP IP/OBS HIGH 50: CPT | Performed by: INTERNAL MEDICINE

## 2020-01-01 PROCEDURE — 80053 COMPREHEN METABOLIC PANEL: CPT

## 2020-01-01 PROCEDURE — 93010 ELECTROCARDIOGRAM REPORT: CPT | Performed by: INTERNAL MEDICINE

## 2020-01-01 PROCEDURE — 82947 ASSAY GLUCOSE BLOOD QUANT: CPT

## 2020-01-01 PROCEDURE — 87186 SC STD MICRODIL/AGAR DIL: CPT

## 2020-01-01 PROCEDURE — 83880 ASSAY OF NATRIURETIC PEPTIDE: CPT

## 2020-01-01 PROCEDURE — 83605 ASSAY OF LACTIC ACID: CPT

## 2020-01-01 PROCEDURE — 83735 ASSAY OF MAGNESIUM: CPT

## 2020-01-01 PROCEDURE — 83970 ASSAY OF PARATHORMONE: CPT

## 2020-01-01 PROCEDURE — 6360000002 HC RX W HCPCS: Performed by: NURSE PRACTITIONER

## 2020-01-01 PROCEDURE — 82570 ASSAY OF URINE CREATININE: CPT

## 2020-01-01 PROCEDURE — 96375 TX/PRO/DX INJ NEW DRUG ADDON: CPT

## 2020-01-01 PROCEDURE — 2580000003 HC RX 258: Performed by: NURSE PRACTITIONER

## 2020-01-01 PROCEDURE — 6370000000 HC RX 637 (ALT 250 FOR IP): Performed by: HOSPITALIST

## 2020-01-01 PROCEDURE — 31500 INSERT EMERGENCY AIRWAY: CPT

## 2020-01-01 PROCEDURE — 85025 COMPLETE CBC W/AUTO DIFF WBC: CPT

## 2020-01-01 PROCEDURE — 82306 VITAMIN D 25 HYDROXY: CPT

## 2020-01-01 PROCEDURE — 99284 EMERGENCY DEPT VISIT MOD MDM: CPT

## 2020-01-01 PROCEDURE — 84300 ASSAY OF URINE SODIUM: CPT

## 2020-01-01 PROCEDURE — 6370000000 HC RX 637 (ALT 250 FOR IP): Performed by: PHYSICIAN ASSISTANT

## 2020-01-01 PROCEDURE — C8929 TTE W OR WO FOL WCON,DOPPLER: HCPCS

## 2020-01-01 PROCEDURE — 84550 ASSAY OF BLOOD/URIC ACID: CPT

## 2020-01-01 PROCEDURE — 6360000002 HC RX W HCPCS

## 2020-01-01 PROCEDURE — 99285 EMERGENCY DEPT VISIT HI MDM: CPT

## 2020-01-01 PROCEDURE — 87040 BLOOD CULTURE FOR BACTERIA: CPT

## 2020-01-01 PROCEDURE — 99232 SBSQ HOSP IP/OBS MODERATE 35: CPT | Performed by: NURSE PRACTITIONER

## 2020-01-01 PROCEDURE — 93005 ELECTROCARDIOGRAM TRACING: CPT

## 2020-01-01 PROCEDURE — 84156 ASSAY OF PROTEIN URINE: CPT

## 2020-01-01 PROCEDURE — 0BH17EZ INSERTION OF ENDOTRACHEAL AIRWAY INTO TRACHEA, VIA NATURAL OR ARTIFICIAL OPENING: ICD-10-PCS | Performed by: INTERNAL MEDICINE

## 2020-01-01 PROCEDURE — 76770 US EXAM ABDO BACK WALL COMP: CPT

## 2020-01-01 PROCEDURE — 87077 CULTURE AEROBIC IDENTIFY: CPT

## 2020-01-01 PROCEDURE — 5A1955Z RESPIRATORY VENTILATION, GREATER THAN 96 CONSECUTIVE HOURS: ICD-10-PCS | Performed by: EMERGENCY MEDICINE

## 2020-01-01 PROCEDURE — 6360000002 HC RX W HCPCS: Performed by: EMERGENCY MEDICINE

## 2020-01-01 PROCEDURE — 99223 1ST HOSP IP/OBS HIGH 75: CPT | Performed by: INTERNAL MEDICINE

## 2020-01-01 PROCEDURE — 5A1955Z RESPIRATORY VENTILATION, GREATER THAN 96 CONSECUTIVE HOURS: ICD-10-PCS | Performed by: INTERNAL MEDICINE

## 2020-01-01 PROCEDURE — 93005 ELECTROCARDIOGRAM TRACING: CPT | Performed by: INTERNAL MEDICINE

## 2020-01-01 PROCEDURE — 89220 SPUTUM SPECIMEN COLLECTION: CPT

## 2020-01-01 PROCEDURE — 87205 SMEAR GRAM STAIN: CPT

## 2020-01-01 PROCEDURE — 2500000003 HC RX 250 WO HCPCS: Performed by: EMERGENCY MEDICINE

## 2020-01-01 PROCEDURE — 96365 THER/PROPH/DIAG IV INF INIT: CPT

## 2020-01-01 PROCEDURE — 99231 SBSQ HOSP IP/OBS SF/LOW 25: CPT | Performed by: NURSE PRACTITIONER

## 2020-01-01 PROCEDURE — 99222 1ST HOSP IP/OBS MODERATE 55: CPT | Performed by: NURSE PRACTITIONER

## 2020-01-01 RX ORDER — HYDROCHLOROTHIAZIDE 25 MG/1
25 TABLET ORAL DAILY
Status: DISCONTINUED | OUTPATIENT
Start: 2020-01-01 | End: 2020-01-01

## 2020-01-01 RX ORDER — FUROSEMIDE 20 MG/1
20 TABLET ORAL 2 TIMES DAILY
COMMUNITY

## 2020-01-01 RX ORDER — NITROGLYCERIN 20 MG/100ML
5 INJECTION INTRAVENOUS CONTINUOUS
Status: DISCONTINUED | OUTPATIENT
Start: 2020-01-01 | End: 2020-01-01 | Stop reason: HOSPADM

## 2020-01-01 RX ORDER — MORPHINE SULFATE 4 MG/ML
2 INJECTION, SOLUTION INTRAMUSCULAR; INTRAVENOUS EVERY 4 HOURS PRN
Status: DISCONTINUED | OUTPATIENT
Start: 2020-01-01 | End: 2020-01-01 | Stop reason: HOSPADM

## 2020-01-01 RX ORDER — SODIUM CHLORIDE 450 MG/100ML
INJECTION, SOLUTION INTRAVENOUS CONTINUOUS
Status: DISCONTINUED | OUTPATIENT
Start: 2020-01-01 | End: 2020-01-01 | Stop reason: HOSPADM

## 2020-01-01 RX ORDER — CARVEDILOL 3.12 MG/1
3.12 TABLET ORAL 2 TIMES DAILY WITH MEALS
Status: DISCONTINUED | OUTPATIENT
Start: 2020-01-01 | End: 2020-01-01

## 2020-01-01 RX ORDER — CLONIDINE HYDROCHLORIDE 0.1 MG/1
0.1 TABLET ORAL ONCE
Status: COMPLETED | OUTPATIENT
Start: 2020-01-01 | End: 2020-01-01

## 2020-01-01 RX ORDER — FENTANYL 25 UG/H
1 PATCH TRANSDERMAL
Status: DISCONTINUED | OUTPATIENT
Start: 2020-01-01 | End: 2020-01-01 | Stop reason: HOSPADM

## 2020-01-01 RX ORDER — NITROGLYCERIN 20 MG/100ML
5 INJECTION INTRAVENOUS CONTINUOUS
Status: DISCONTINUED | OUTPATIENT
Start: 2020-01-01 | End: 2020-01-01

## 2020-01-01 RX ORDER — LISINOPRIL 2.5 MG/1
2.5 TABLET ORAL 2 TIMES DAILY
Status: DISCONTINUED | OUTPATIENT
Start: 2020-01-01 | End: 2020-01-01

## 2020-01-01 RX ORDER — ETOMIDATE 2 MG/ML
20 INJECTION INTRAVENOUS ONCE
Status: COMPLETED | OUTPATIENT
Start: 2020-01-01 | End: 2020-01-01

## 2020-01-01 RX ORDER — SODIUM CHLORIDE 9 MG/ML
INJECTION, SOLUTION INTRAVENOUS CONTINUOUS
Status: DISCONTINUED | OUTPATIENT
Start: 2020-01-01 | End: 2020-01-01

## 2020-01-01 RX ORDER — PROPOFOL 10 MG/ML
10 INJECTION, EMULSION INTRAVENOUS
Status: DISCONTINUED | OUTPATIENT
Start: 2020-01-01 | End: 2020-01-01 | Stop reason: HOSPADM

## 2020-01-01 RX ORDER — DEXTROSE MONOHYDRATE 50 MG/ML
INJECTION, SOLUTION INTRAVENOUS CONTINUOUS
Status: DISCONTINUED | OUTPATIENT
Start: 2020-01-01 | End: 2020-01-01

## 2020-01-01 RX ORDER — ENALAPRILAT 2.5 MG/2ML
1.25 INJECTION INTRAVENOUS EVERY 12 HOURS
Status: DISCONTINUED | OUTPATIENT
Start: 2020-01-01 | End: 2020-01-01

## 2020-01-01 RX ORDER — 0.9 % SODIUM CHLORIDE 0.9 %
500 INTRAVENOUS SOLUTION INTRAVENOUS ONCE
Status: COMPLETED | OUTPATIENT
Start: 2020-01-01 | End: 2020-01-01

## 2020-01-01 RX ORDER — SUCCINYLCHOLINE CHLORIDE 20 MG/ML
100 INJECTION INTRAMUSCULAR; INTRAVENOUS ONCE
Status: COMPLETED | OUTPATIENT
Start: 2020-01-01 | End: 2020-01-01

## 2020-01-01 RX ORDER — ACETYLCYSTEINE 200 MG/ML
600 SOLUTION ORAL; RESPIRATORY (INHALATION) 2 TIMES DAILY
Status: DISCONTINUED | OUTPATIENT
Start: 2020-01-01 | End: 2020-01-01 | Stop reason: HOSPADM

## 2020-01-01 RX ORDER — PROPOFOL 10 MG/ML
10 INJECTION, EMULSION INTRAVENOUS ONCE
Status: COMPLETED | OUTPATIENT
Start: 2020-01-01 | End: 2020-01-01

## 2020-01-01 RX ORDER — BUMETANIDE 0.25 MG/ML
1 INJECTION, SOLUTION INTRAMUSCULAR; INTRAVENOUS ONCE
Status: COMPLETED | OUTPATIENT
Start: 2020-01-01 | End: 2020-01-01

## 2020-01-01 RX ORDER — LEVOFLOXACIN 5 MG/ML
250 INJECTION, SOLUTION INTRAVENOUS EVERY 24 HOURS
Status: DISCONTINUED | OUTPATIENT
Start: 2020-01-01 | End: 2020-01-01 | Stop reason: HOSPADM

## 2020-01-01 RX ORDER — DEXTROSE AND SODIUM CHLORIDE 5; .2 G/100ML; G/100ML
INJECTION, SOLUTION INTRAVENOUS CONTINUOUS
Status: DISCONTINUED | OUTPATIENT
Start: 2020-01-01 | End: 2020-01-01

## 2020-01-01 RX ORDER — PROPOFOL 10 MG/ML
INJECTION, EMULSION INTRAVENOUS
Status: COMPLETED
Start: 2020-01-01 | End: 2020-01-01

## 2020-01-01 RX ORDER — NITROGLYCERIN 20 MG/100ML
INJECTION INTRAVENOUS
Status: COMPLETED
Start: 2020-01-01 | End: 2020-01-01

## 2020-01-01 RX ORDER — ASPIRIN 81 MG/1
81 TABLET, CHEWABLE ORAL DAILY
Status: DISCONTINUED | OUTPATIENT
Start: 2020-01-01 | End: 2020-01-01 | Stop reason: HOSPADM

## 2020-01-01 RX ORDER — CARVEDILOL 6.25 MG/1
6.25 TABLET ORAL 2 TIMES DAILY WITH MEALS
COMMUNITY

## 2020-01-01 RX ORDER — CARVEDILOL 6.25 MG/1
6.25 TABLET ORAL 2 TIMES DAILY WITH MEALS
Status: DISCONTINUED | OUTPATIENT
Start: 2020-01-01 | End: 2020-01-01 | Stop reason: HOSPADM

## 2020-01-01 RX ORDER — FUROSEMIDE 10 MG/ML
80 INJECTION INTRAMUSCULAR; INTRAVENOUS ONCE
Status: COMPLETED | OUTPATIENT
Start: 2020-01-01 | End: 2020-01-01

## 2020-01-01 RX ORDER — VECURONIUM BROMIDE 1 MG/ML
10 INJECTION, POWDER, LYOPHILIZED, FOR SOLUTION INTRAVENOUS ONCE
Status: COMPLETED | OUTPATIENT
Start: 2020-01-01 | End: 2020-01-01

## 2020-01-01 RX ORDER — METOPROLOL TARTRATE 5 MG/5ML
5 INJECTION INTRAVENOUS EVERY 6 HOURS
Status: DISCONTINUED | OUTPATIENT
Start: 2020-01-01 | End: 2020-01-01

## 2020-01-01 RX ORDER — CHLORHEXIDINE GLUCONATE 0.12 MG/ML
15 RINSE ORAL 2 TIMES DAILY
Status: DISCONTINUED | OUTPATIENT
Start: 2020-01-01 | End: 2020-01-01 | Stop reason: HOSPADM

## 2020-01-01 RX ORDER — PANTOPRAZOLE SODIUM 40 MG/1
40 TABLET, DELAYED RELEASE ORAL DAILY
COMMUNITY

## 2020-01-01 RX ORDER — POTASSIUM CHLORIDE 1.5 G/1.77G
20 POWDER, FOR SOLUTION ORAL 2 TIMES DAILY
COMMUNITY

## 2020-01-01 RX ADMIN — METOPROLOL TARTRATE 5 MG: 5 INJECTION, SOLUTION INTRAVENOUS at 18:05

## 2020-01-01 RX ADMIN — DEXTROSE MONOHYDRATE: 50 INJECTION, SOLUTION INTRAVENOUS at 11:25

## 2020-01-01 RX ADMIN — PROPOFOL 45 MCG/KG/MIN: 10 INJECTION, EMULSION INTRAVENOUS at 07:31

## 2020-01-01 RX ADMIN — ASPIRIN 81 MG 81 MG: 81 TABLET ORAL at 08:21

## 2020-01-01 RX ADMIN — CHLORHEXIDINE GLUCONATE 15 ML: 1.2 RINSE ORAL at 22:01

## 2020-01-01 RX ADMIN — PROPOFOL 35 MCG/KG/MIN: 10 INJECTION, EMULSION INTRAVENOUS at 04:07

## 2020-01-01 RX ADMIN — DEXTROSE MONOHYDRATE: 50 INJECTION, SOLUTION INTRAVENOUS at 03:38

## 2020-01-01 RX ADMIN — PROPOFOL 45 MCG/KG/MIN: 10 INJECTION, EMULSION INTRAVENOUS at 20:02

## 2020-01-01 RX ADMIN — FAMOTIDINE 20 MG: 10 INJECTION INTRAVENOUS at 21:16

## 2020-01-01 RX ADMIN — CLONIDINE HYDROCHLORIDE 0.1 MG: 0.1 TABLET ORAL at 15:52

## 2020-01-01 RX ADMIN — METOPROLOL TARTRATE 5 MG: 5 INJECTION, SOLUTION INTRAVENOUS at 13:22

## 2020-01-01 RX ADMIN — IPRATROPIUM BROMIDE 0.5 MG: 0.5 SOLUTION RESPIRATORY (INHALATION) at 14:11

## 2020-01-01 RX ADMIN — PROPOFOL 50 MCG/KG/MIN: 10 INJECTION, EMULSION INTRAVENOUS at 13:38

## 2020-01-01 RX ADMIN — PIPERACILLIN SODIUM AND TAZOBACTAM SODIUM 3.38 G: 3; .375 INJECTION, POWDER, LYOPHILIZED, FOR SOLUTION INTRAVENOUS at 11:20

## 2020-01-01 RX ADMIN — FAMOTIDINE 20 MG: 10 INJECTION INTRAVENOUS at 21:44

## 2020-01-01 RX ADMIN — SODIUM CHLORIDE: 9 INJECTION, SOLUTION INTRAVENOUS at 15:34

## 2020-01-01 RX ADMIN — PIPERACILLIN SODIUM AND TAZOBACTAM SODIUM 3.38 G: 3; .375 INJECTION, POWDER, LYOPHILIZED, FOR SOLUTION INTRAVENOUS at 22:43

## 2020-01-01 RX ADMIN — SODIUM CHLORIDE: 9 INJECTION, SOLUTION INTRAVENOUS at 17:56

## 2020-01-01 RX ADMIN — CARVEDILOL 6.25 MG: 6.25 TABLET, FILM COATED ORAL at 07:31

## 2020-01-01 RX ADMIN — IPRATROPIUM BROMIDE 0.5 MG: 0.5 SOLUTION RESPIRATORY (INHALATION) at 06:17

## 2020-01-01 RX ADMIN — CHLORHEXIDINE GLUCONATE 15 ML: 1.2 RINSE ORAL at 21:45

## 2020-01-01 RX ADMIN — CHLORHEXIDINE GLUCONATE 15 ML: 1.2 RINSE ORAL at 20:52

## 2020-01-01 RX ADMIN — CHLORHEXIDINE GLUCONATE 15 ML: 1.2 RINSE ORAL at 09:16

## 2020-01-01 RX ADMIN — FAMOTIDINE 20 MG: 10 INJECTION INTRAVENOUS at 20:00

## 2020-01-01 RX ADMIN — FAMOTIDINE 20 MG: 10 INJECTION INTRAVENOUS at 20:58

## 2020-01-01 RX ADMIN — METOPROLOL TARTRATE 5 MG: 5 INJECTION, SOLUTION INTRAVENOUS at 10:33

## 2020-01-01 RX ADMIN — ACETYLCYSTEINE 600 MG: 200 SOLUTION ORAL; RESPIRATORY (INHALATION) at 06:21

## 2020-01-01 RX ADMIN — FAMOTIDINE 20 MG: 10 INJECTION INTRAVENOUS at 20:18

## 2020-01-01 RX ADMIN — METOPROLOL TARTRATE 5 MG: 5 INJECTION, SOLUTION INTRAVENOUS at 03:50

## 2020-01-01 RX ADMIN — METOPROLOL TARTRATE 5 MG: 5 INJECTION, SOLUTION INTRAVENOUS at 16:23

## 2020-01-01 RX ADMIN — GUAIFENESIN 400 MG: 100 SOLUTION ORAL at 10:49

## 2020-01-01 RX ADMIN — CARVEDILOL 6.25 MG: 6.25 TABLET, FILM COATED ORAL at 07:50

## 2020-01-01 RX ADMIN — BUMETANIDE 1 MG: 0.25 INJECTION INTRAMUSCULAR; INTRAVENOUS at 10:58

## 2020-01-01 RX ADMIN — PROPOFOL 30 MCG/KG/MIN: 10 INJECTION, EMULSION INTRAVENOUS at 13:31

## 2020-01-01 RX ADMIN — ASPIRIN 81 MG 81 MG: 81 TABLET ORAL at 10:33

## 2020-01-01 RX ADMIN — PIPERACILLIN SODIUM AND TAZOBACTAM SODIUM 3.38 G: 3; .375 INJECTION, POWDER, LYOPHILIZED, FOR SOLUTION INTRAVENOUS at 10:53

## 2020-01-01 RX ADMIN — IPRATROPIUM BROMIDE 0.5 MG: 0.5 SOLUTION RESPIRATORY (INHALATION) at 06:21

## 2020-01-01 RX ADMIN — DILTIAZEM HYDROCHLORIDE 7.5 MG/HR: 5 INJECTION INTRAVENOUS at 20:04

## 2020-01-01 RX ADMIN — PROPOFOL 45 MCG/KG/MIN: 10 INJECTION, EMULSION INTRAVENOUS at 16:34

## 2020-01-01 RX ADMIN — CHLORHEXIDINE GLUCONATE 15 ML: 1.2 RINSE ORAL at 21:33

## 2020-01-01 RX ADMIN — CHLORHEXIDINE GLUCONATE 15 ML: 1.2 RINSE ORAL at 09:28

## 2020-01-01 RX ADMIN — SODIUM CHLORIDE: 9 INJECTION, SOLUTION INTRAVENOUS at 02:09

## 2020-01-01 RX ADMIN — PROPOFOL 30 MCG/KG/MIN: 10 INJECTION, EMULSION INTRAVENOUS at 20:30

## 2020-01-01 RX ADMIN — PROPOFOL 45 MCG/KG/MIN: 10 INJECTION, EMULSION INTRAVENOUS at 13:21

## 2020-01-01 RX ADMIN — FUROSEMIDE 80 MG: 10 INJECTION, SOLUTION INTRAMUSCULAR; INTRAVENOUS at 12:37

## 2020-01-01 RX ADMIN — IPRATROPIUM BROMIDE 0.5 MG: 0.5 SOLUTION RESPIRATORY (INHALATION) at 14:22

## 2020-01-01 RX ADMIN — LABETALOL HYDROCHLORIDE 0.5 MG/MIN: 5 INJECTION, SOLUTION INTRAVENOUS at 15:28

## 2020-01-01 RX ADMIN — PROPOFOL 50 MCG/KG/MIN: 10 INJECTION, EMULSION INTRAVENOUS at 22:47

## 2020-01-01 RX ADMIN — SODIUM CHLORIDE: 9 INJECTION, SOLUTION INTRAVENOUS at 05:20

## 2020-01-01 RX ADMIN — PROPOFOL 30 MCG/KG/MIN: 10 INJECTION, EMULSION INTRAVENOUS at 09:35

## 2020-01-01 RX ADMIN — FAMOTIDINE 20 MG: 10 INJECTION INTRAVENOUS at 21:33

## 2020-01-01 RX ADMIN — PROPOFOL 35 MCG/KG/MIN: 10 INJECTION, EMULSION INTRAVENOUS at 22:25

## 2020-01-01 RX ADMIN — PROPOFOL 45 MCG/KG/MIN: 10 INJECTION, EMULSION INTRAVENOUS at 17:06

## 2020-01-01 RX ADMIN — CHLORHEXIDINE GLUCONATE 15 ML: 1.2 RINSE ORAL at 20:31

## 2020-01-01 RX ADMIN — BUMETANIDE 0.1 MG/HR: 0.25 INJECTION INTRAMUSCULAR; INTRAVENOUS at 19:29

## 2020-01-01 RX ADMIN — CARVEDILOL 3.12 MG: 3.12 TABLET, FILM COATED ORAL at 11:25

## 2020-01-01 RX ADMIN — PROPOFOL 30 MCG/KG/MIN: 10 INJECTION, EMULSION INTRAVENOUS at 05:20

## 2020-01-01 RX ADMIN — CHLORHEXIDINE GLUCONATE 15 ML: 1.2 RINSE ORAL at 21:19

## 2020-01-01 RX ADMIN — CARVEDILOL 6.25 MG: 6.25 TABLET, FILM COATED ORAL at 18:34

## 2020-01-01 RX ADMIN — LEVOFLOXACIN 250 MG: 5 INJECTION, SOLUTION INTRAVENOUS at 10:49

## 2020-01-01 RX ADMIN — CHLOROTHIAZIDE SODIUM 500 MG: 500 INJECTION, POWDER, LYOPHILIZED, FOR SOLUTION INTRAVENOUS at 13:22

## 2020-01-01 RX ADMIN — DEXTROSE MONOHYDRATE: 50 INJECTION, SOLUTION INTRAVENOUS at 08:05

## 2020-01-01 RX ADMIN — PROPOFOL 10 MCG/KG/MIN: 10 INJECTION, EMULSION INTRAVENOUS at 12:36

## 2020-01-01 RX ADMIN — ACETYLCYSTEINE 600 MG: 200 SOLUTION ORAL; RESPIRATORY (INHALATION) at 18:38

## 2020-01-01 RX ADMIN — IPRATROPIUM BROMIDE 0.5 MG: 0.5 SOLUTION RESPIRATORY (INHALATION) at 10:06

## 2020-01-01 RX ADMIN — PIPERACILLIN SODIUM AND TAZOBACTAM SODIUM 3.38 G: 3; .375 INJECTION, POWDER, LYOPHILIZED, FOR SOLUTION INTRAVENOUS at 09:26

## 2020-01-01 RX ADMIN — ETOMIDATE 20 MG: 20 INJECTION, SOLUTION INTRAVENOUS at 12:13

## 2020-01-01 RX ADMIN — HYDROMORPHONE HYDROCHLORIDE 1 MG/HR: 10 INJECTION INTRAMUSCULAR; INTRAVENOUS; SUBCUTANEOUS at 10:22

## 2020-01-01 RX ADMIN — CHLORHEXIDINE GLUCONATE 15 ML: 1.2 RINSE ORAL at 09:00

## 2020-01-01 RX ADMIN — PROPOFOL 25 MCG/KG/MIN: 10 INJECTION, EMULSION INTRAVENOUS at 01:45

## 2020-01-01 RX ADMIN — CHLORHEXIDINE GLUCONATE 15 ML: 1.2 RINSE ORAL at 10:34

## 2020-01-01 RX ADMIN — PIPERACILLIN SODIUM AND TAZOBACTAM SODIUM 3.38 G: 3; .375 INJECTION, POWDER, LYOPHILIZED, FOR SOLUTION INTRAVENOUS at 09:57

## 2020-01-01 RX ADMIN — PROPOFOL 45 MCG/KG/MIN: 10 INJECTION, EMULSION INTRAVENOUS at 03:38

## 2020-01-01 RX ADMIN — PROPOFOL 45 MCG/KG/MIN: 10 INJECTION, EMULSION INTRAVENOUS at 07:49

## 2020-01-01 RX ADMIN — PROPOFOL 25 MCG/KG/MIN: 10 INJECTION, EMULSION INTRAVENOUS at 15:28

## 2020-01-01 RX ADMIN — PROPOFOL 40 MCG/KG/MIN: 10 INJECTION, EMULSION INTRAVENOUS at 01:26

## 2020-01-01 RX ADMIN — IPRATROPIUM BROMIDE 0.5 MG: 0.5 SOLUTION RESPIRATORY (INHALATION) at 09:50

## 2020-01-01 RX ADMIN — FAMOTIDINE 20 MG: 10 INJECTION INTRAVENOUS at 20:33

## 2020-01-01 RX ADMIN — GUAIFENESIN 400 MG: 100 SOLUTION ORAL at 20:00

## 2020-01-01 RX ADMIN — FAMOTIDINE 20 MG: 10 INJECTION INTRAVENOUS at 21:19

## 2020-01-01 RX ADMIN — CARVEDILOL 6.25 MG: 6.25 TABLET, FILM COATED ORAL at 17:34

## 2020-01-01 RX ADMIN — VECURONIUM BROMIDE 10 MG: 1 INJECTION, POWDER, LYOPHILIZED, FOR SOLUTION INTRAVENOUS at 12:30

## 2020-01-01 RX ADMIN — ASPIRIN 81 MG 81 MG: 81 TABLET ORAL at 07:50

## 2020-01-01 RX ADMIN — LEVOFLOXACIN 250 MG: 5 INJECTION, SOLUTION INTRAVENOUS at 09:25

## 2020-01-01 RX ADMIN — ACETYLCYSTEINE 600 MG: 200 SOLUTION ORAL; RESPIRATORY (INHALATION) at 06:17

## 2020-01-01 RX ADMIN — DEXTROSE AND SODIUM CHLORIDE: 5; 200 INJECTION, SOLUTION INTRAVENOUS at 13:04

## 2020-01-01 RX ADMIN — CHLORHEXIDINE GLUCONATE 15 ML: 1.2 RINSE ORAL at 20:18

## 2020-01-01 RX ADMIN — CHLORHEXIDINE GLUCONATE 15 ML: 1.2 RINSE ORAL at 12:25

## 2020-01-01 RX ADMIN — ENALAPRILAT 1.25 MG: 2.5 INJECTION INTRAVENOUS at 15:09

## 2020-01-01 RX ADMIN — CARVEDILOL 6.25 MG: 6.25 TABLET, FILM COATED ORAL at 17:05

## 2020-01-01 RX ADMIN — GUAIFENESIN 400 MG: 100 SOLUTION ORAL at 20:06

## 2020-01-01 RX ADMIN — CARVEDILOL 6.25 MG: 6.25 TABLET, FILM COATED ORAL at 17:14

## 2020-01-01 RX ADMIN — PIPERACILLIN SODIUM AND TAZOBACTAM SODIUM 3.38 G: 3; .375 INJECTION, POWDER, LYOPHILIZED, FOR SOLUTION INTRAVENOUS at 22:25

## 2020-01-01 RX ADMIN — PIPERACILLIN SODIUM AND TAZOBACTAM SODIUM 3.38 G: 3; .375 INJECTION, POWDER, LYOPHILIZED, FOR SOLUTION INTRAVENOUS at 22:47

## 2020-01-01 RX ADMIN — PROPOFOL 40 MCG/KG/MIN: 10 INJECTION, EMULSION INTRAVENOUS at 03:44

## 2020-01-01 RX ADMIN — METOPROLOL TARTRATE 5 MG: 5 INJECTION, SOLUTION INTRAVENOUS at 18:34

## 2020-01-01 RX ADMIN — PIPERACILLIN SODIUM AND TAZOBACTAM SODIUM 3.38 G: 3; .375 INJECTION, POWDER, LYOPHILIZED, FOR SOLUTION INTRAVENOUS at 22:04

## 2020-01-01 RX ADMIN — SODIUM CHLORIDE 500 ML: 9 INJECTION, SOLUTION INTRAVENOUS at 14:11

## 2020-01-01 RX ADMIN — PIPERACILLIN SODIUM AND TAZOBACTAM SODIUM 3.38 G: 3; .375 INJECTION, POWDER, LYOPHILIZED, FOR SOLUTION INTRAVENOUS at 11:30

## 2020-01-01 RX ADMIN — HYDROCHLOROTHIAZIDE 25 MG: 25 TABLET ORAL at 09:15

## 2020-01-01 RX ADMIN — METOPROLOL TARTRATE 5 MG: 5 INJECTION, SOLUTION INTRAVENOUS at 07:33

## 2020-01-01 RX ADMIN — PROPOFOL 40 MCG/KG/MIN: 10 INJECTION, EMULSION INTRAVENOUS at 08:30

## 2020-01-01 RX ADMIN — METOPROLOL TARTRATE 5 MG: 5 INJECTION, SOLUTION INTRAVENOUS at 21:16

## 2020-01-01 RX ADMIN — PROPOFOL 35 MCG/KG/MIN: 10 INJECTION, EMULSION INTRAVENOUS at 08:05

## 2020-01-01 RX ADMIN — PROPOFOL 50 MCG/KG/MIN: 10 INJECTION, EMULSION INTRAVENOUS at 18:04

## 2020-01-01 RX ADMIN — Medication: at 07:49

## 2020-01-01 RX ADMIN — METOPROLOL TARTRATE 5 MG: 5 INJECTION, SOLUTION INTRAVENOUS at 19:33

## 2020-01-01 RX ADMIN — METOPROLOL TARTRATE 5 MG: 5 INJECTION, SOLUTION INTRAVENOUS at 00:00

## 2020-01-01 RX ADMIN — METOPROLOL TARTRATE 5 MG: 5 INJECTION, SOLUTION INTRAVENOUS at 21:19

## 2020-01-01 RX ADMIN — METOPROLOL TARTRATE 5 MG: 5 INJECTION, SOLUTION INTRAVENOUS at 11:30

## 2020-01-01 RX ADMIN — PIPERACILLIN SODIUM AND TAZOBACTAM SODIUM 3.38 G: 3; .375 INJECTION, POWDER, LYOPHILIZED, FOR SOLUTION INTRAVENOUS at 10:28

## 2020-01-01 RX ADMIN — FAMOTIDINE 20 MG: 10 INJECTION INTRAVENOUS at 20:06

## 2020-01-01 RX ADMIN — CHLORHEXIDINE GLUCONATE 15 ML: 1.2 RINSE ORAL at 08:44

## 2020-01-01 RX ADMIN — CARVEDILOL 3.12 MG: 3.12 TABLET, FILM COATED ORAL at 17:07

## 2020-01-01 RX ADMIN — METOPROLOL TARTRATE 5 MG: 5 INJECTION, SOLUTION INTRAVENOUS at 15:55

## 2020-01-01 RX ADMIN — METOPROLOL TARTRATE 25 MG: 25 TABLET, FILM COATED ORAL at 02:20

## 2020-01-01 RX ADMIN — PROPOFOL 20 MCG/KG/MIN: 10 INJECTION, EMULSION INTRAVENOUS at 08:36

## 2020-01-01 RX ADMIN — METOPROLOL TARTRATE 5 MG: 5 INJECTION, SOLUTION INTRAVENOUS at 20:35

## 2020-01-01 RX ADMIN — PROPOFOL 50 MCG/KG/MIN: 10 INJECTION, EMULSION INTRAVENOUS at 02:45

## 2020-01-01 RX ADMIN — ASPIRIN 81 MG 81 MG: 81 TABLET ORAL at 07:31

## 2020-01-01 RX ADMIN — METOPROLOL TARTRATE 5 MG: 5 INJECTION, SOLUTION INTRAVENOUS at 05:53

## 2020-01-01 RX ADMIN — DEXTROSE AND SODIUM CHLORIDE: 5; 200 INJECTION, SOLUTION INTRAVENOUS at 22:43

## 2020-01-01 RX ADMIN — PROPOFOL 25 MCG/KG/MIN: 10 INJECTION, EMULSION INTRAVENOUS at 07:49

## 2020-01-01 RX ADMIN — IPRATROPIUM BROMIDE 0.5 MG: 0.5 SOLUTION RESPIRATORY (INHALATION) at 18:39

## 2020-01-01 RX ADMIN — PROPOFOL 25 MCG/KG/MIN: 10 INJECTION, EMULSION INTRAVENOUS at 22:33

## 2020-01-01 RX ADMIN — PIPERACILLIN SODIUM AND TAZOBACTAM SODIUM 3.38 G: 3; .375 INJECTION, POWDER, LYOPHILIZED, FOR SOLUTION INTRAVENOUS at 22:00

## 2020-01-01 RX ADMIN — CHLORHEXIDINE GLUCONATE 15 ML: 1.2 RINSE ORAL at 20:58

## 2020-01-01 RX ADMIN — NITROGLYCERIN 5 MCG/MIN: 20 INJECTION INTRAVENOUS at 10:11

## 2020-01-01 RX ADMIN — CHLORHEXIDINE GLUCONATE 15 ML: 1.2 RINSE ORAL at 20:00

## 2020-01-01 RX ADMIN — MORPHINE SULFATE 2 MG: 4 INJECTION INTRAVENOUS at 00:06

## 2020-01-01 RX ADMIN — METOPROLOL TARTRATE 5 MG: 5 INJECTION, SOLUTION INTRAVENOUS at 12:25

## 2020-01-01 RX ADMIN — CHLORHEXIDINE GLUCONATE 15 ML: 1.2 RINSE ORAL at 20:13

## 2020-01-01 RX ADMIN — CHLORHEXIDINE GLUCONATE 15 ML: 1.2 RINSE ORAL at 07:51

## 2020-01-01 RX ADMIN — PROPOFOL 50 MCG/KG/MIN: 10 INJECTION, EMULSION INTRAVENOUS at 23:01

## 2020-01-01 RX ADMIN — METOPROLOL TARTRATE 5 MG: 5 INJECTION, SOLUTION INTRAVENOUS at 00:23

## 2020-01-01 RX ADMIN — CHLORHEXIDINE GLUCONATE 15 ML: 1.2 RINSE ORAL at 08:29

## 2020-01-01 RX ADMIN — FAMOTIDINE 20 MG: 10 INJECTION INTRAVENOUS at 22:01

## 2020-01-01 RX ADMIN — PIPERACILLIN SODIUM AND TAZOBACTAM SODIUM 3.38 G: 3; .375 INJECTION, POWDER, LYOPHILIZED, FOR SOLUTION INTRAVENOUS at 10:49

## 2020-01-01 RX ADMIN — PROPOFOL 40 MCG/KG/MIN: 10 INJECTION, EMULSION INTRAVENOUS at 19:45

## 2020-01-01 RX ADMIN — LEVOFLOXACIN 250 MG: 5 INJECTION, SOLUTION INTRAVENOUS at 09:15

## 2020-01-01 RX ADMIN — PROPOFOL 35 MCG/KG/MIN: 10 INJECTION, EMULSION INTRAVENOUS at 02:18

## 2020-01-01 RX ADMIN — PROPOFOL 45 MCG/KG/MIN: 10 INJECTION, EMULSION INTRAVENOUS at 21:33

## 2020-01-01 RX ADMIN — ASPIRIN 81 MG 81 MG: 81 TABLET ORAL at 09:15

## 2020-01-01 RX ADMIN — CHLORHEXIDINE GLUCONATE 15 ML: 1.2 RINSE ORAL at 21:22

## 2020-01-01 RX ADMIN — PROPOFOL 40 MCG/KG/MIN: 10 INJECTION, EMULSION INTRAVENOUS at 20:32

## 2020-01-01 RX ADMIN — SODIUM CHLORIDE: 4.5 INJECTION, SOLUTION INTRAVENOUS at 10:51

## 2020-01-01 RX ADMIN — PROPOFOL 35 MCG/KG/MIN: 10 INJECTION, EMULSION INTRAVENOUS at 21:16

## 2020-01-01 RX ADMIN — DEXTROSE AND SODIUM CHLORIDE: 5; 200 INJECTION, SOLUTION INTRAVENOUS at 23:13

## 2020-01-01 RX ADMIN — PROPOFOL 20 MCG/KG/MIN: 10 INJECTION, EMULSION INTRAVENOUS at 16:25

## 2020-01-01 RX ADMIN — METOPROLOL TARTRATE 5 MG: 5 INJECTION, SOLUTION INTRAVENOUS at 05:26

## 2020-01-01 RX ADMIN — METOPROLOL TARTRATE 5 MG: 5 INJECTION, SOLUTION INTRAVENOUS at 00:56

## 2020-01-01 RX ADMIN — METOPROLOL TARTRATE 5 MG: 5 INJECTION, SOLUTION INTRAVENOUS at 12:23

## 2020-01-01 RX ADMIN — PROPOFOL 20 MCG/KG/MIN: 10 INJECTION, EMULSION INTRAVENOUS at 12:02

## 2020-01-01 RX ADMIN — MIDAZOLAM HYDROCHLORIDE 1 MG/HR: 5 INJECTION, SOLUTION INTRAMUSCULAR; INTRAVENOUS at 10:21

## 2020-01-01 RX ADMIN — BUMETANIDE 0.2 MG/HR: 0.25 INJECTION INTRAMUSCULAR; INTRAVENOUS at 15:28

## 2020-01-01 RX ADMIN — PIPERACILLIN SODIUM AND TAZOBACTAM SODIUM 3.38 G: 3; .375 INJECTION, POWDER, LYOPHILIZED, FOR SOLUTION INTRAVENOUS at 22:41

## 2020-01-01 RX ADMIN — PROPOFOL 45 MCG/KG/MIN: 10 INJECTION, EMULSION INTRAVENOUS at 16:52

## 2020-01-01 RX ADMIN — CHLORHEXIDINE GLUCONATE 15 ML: 1.2 RINSE ORAL at 08:54

## 2020-01-01 RX ADMIN — METOPROLOL TARTRATE 5 MG: 5 INJECTION, SOLUTION INTRAVENOUS at 05:36

## 2020-01-01 RX ADMIN — NITROGLYCERIN 5 MCG/MIN: 20 INJECTION INTRAVENOUS at 12:35

## 2020-01-01 RX ADMIN — METOPROLOL TARTRATE 5 MG: 5 INJECTION, SOLUTION INTRAVENOUS at 05:52

## 2020-01-01 RX ADMIN — CHLORHEXIDINE GLUCONATE 15 ML: 1.2 RINSE ORAL at 08:21

## 2020-01-01 RX ADMIN — PROPOFOL 40 MCG/KG/MIN: 10 INJECTION, EMULSION INTRAVENOUS at 13:16

## 2020-01-01 RX ADMIN — DEXTROSE AND SODIUM CHLORIDE: 5; 200 INJECTION, SOLUTION INTRAVENOUS at 09:20

## 2020-01-01 RX ADMIN — PROPOFOL 40 MCG/KG/MIN: 10 INJECTION, EMULSION INTRAVENOUS at 03:23

## 2020-01-01 RX ADMIN — ACETYLCYSTEINE 600 MG: 200 SOLUTION ORAL; RESPIRATORY (INHALATION) at 18:27

## 2020-01-01 RX ADMIN — CHLORHEXIDINE GLUCONATE 15 ML: 1.2 RINSE ORAL at 20:35

## 2020-01-01 RX ADMIN — PROPOFOL 20 MCG/KG/MIN: 10 INJECTION, EMULSION INTRAVENOUS at 06:16

## 2020-01-01 RX ADMIN — SODIUM CHLORIDE: 9 INJECTION, SOLUTION INTRAVENOUS at 05:14

## 2020-01-01 RX ADMIN — ACETYLCYSTEINE 600 MG: 200 SOLUTION ORAL; RESPIRATORY (INHALATION) at 14:11

## 2020-01-01 RX ADMIN — IPRATROPIUM BROMIDE 0.5 MG: 0.5 SOLUTION RESPIRATORY (INHALATION) at 18:27

## 2020-01-01 RX ADMIN — GUAIFENESIN 400 MG: 100 SOLUTION ORAL at 15:28

## 2020-01-01 RX ADMIN — DILTIAZEM HYDROCHLORIDE 5 MG/HR: 5 INJECTION INTRAVENOUS at 11:32

## 2020-01-01 RX ADMIN — PROPOFOL 25 MCG/KG/MIN: 10 INJECTION, EMULSION INTRAVENOUS at 18:26

## 2020-01-01 RX ADMIN — HYDROCHLOROTHIAZIDE 25 MG: 25 TABLET ORAL at 11:25

## 2020-01-01 RX ADMIN — PIPERACILLIN SODIUM AND TAZOBACTAM SODIUM 3.38 G: 3; .375 INJECTION, POWDER, LYOPHILIZED, FOR SOLUTION INTRAVENOUS at 22:53

## 2020-01-01 RX ADMIN — FAMOTIDINE 20 MG: 10 INJECTION INTRAVENOUS at 20:47

## 2020-01-01 RX ADMIN — PIPERACILLIN SODIUM AND TAZOBACTAM SODIUM 3.38 G: 3; .375 INJECTION, POWDER, LYOPHILIZED, FOR SOLUTION INTRAVENOUS at 22:39

## 2020-01-01 RX ADMIN — ASPIRIN 81 MG 81 MG: 81 TABLET ORAL at 08:54

## 2020-01-01 RX ADMIN — LEVOFLOXACIN 250 MG: 5 INJECTION, SOLUTION INTRAVENOUS at 08:44

## 2020-01-01 RX ADMIN — NITROGLYCERIN 5 MCG/MIN: 20 INJECTION INTRAVENOUS at 12:37

## 2020-01-01 RX ADMIN — PIPERACILLIN SODIUM AND TAZOBACTAM SODIUM 3.38 G: 3; .375 INJECTION, POWDER, LYOPHILIZED, FOR SOLUTION INTRAVENOUS at 11:34

## 2020-01-01 RX ADMIN — CARVEDILOL 3.12 MG: 3.12 TABLET, FILM COATED ORAL at 09:15

## 2020-01-01 RX ADMIN — PROPOFOL 45 MCG/KG/MIN: 10 INJECTION, EMULSION INTRAVENOUS at 08:11

## 2020-01-01 RX ADMIN — CHLOROTHIAZIDE SODIUM 500 MG: 500 INJECTION, POWDER, LYOPHILIZED, FOR SOLUTION INTRAVENOUS at 01:31

## 2020-01-01 RX ADMIN — FAMOTIDINE 20 MG: 10 INJECTION INTRAVENOUS at 20:02

## 2020-01-01 RX ADMIN — PROPOFOL 30 MCG/KG/MIN: 10 INJECTION, EMULSION INTRAVENOUS at 16:06

## 2020-01-01 RX ADMIN — FAMOTIDINE 20 MG: 10 INJECTION INTRAVENOUS at 15:09

## 2020-01-01 RX ADMIN — PROPOFOL 25 MCG/KG/MIN: 10 INJECTION, EMULSION INTRAVENOUS at 03:44

## 2020-01-01 RX ADMIN — SUCCINYLCHOLINE CHLORIDE 100 MG: 20 INJECTION, SOLUTION INTRAMUSCULAR; INTRAVENOUS at 12:47

## 2020-01-01 RX ADMIN — GUAIFENESIN 400 MG: 100 SOLUTION ORAL at 16:06

## 2020-01-01 ASSESSMENT — PULMONARY FUNCTION TESTS
PIF_VALUE: 25
PIF_VALUE: 30
PIF_VALUE: 70
PIF_VALUE: 35
PIF_VALUE: 26
PIF_VALUE: 28
PIF_VALUE: 31
PIF_VALUE: 24
PIF_VALUE: 25
PIF_VALUE: 34
PIF_VALUE: 34
PIF_VALUE: 29
PIF_VALUE: 38
PIF_VALUE: 36
PIF_VALUE: 30
PIF_VALUE: 30
PIF_VALUE: 31
PIF_VALUE: 26
PIF_VALUE: 39
PIF_VALUE: 26
PIF_VALUE: 24
PIF_VALUE: 30
PIF_VALUE: 28
PIF_VALUE: 25
PIF_VALUE: 28
PIF_VALUE: 38
PIF_VALUE: 24
PIF_VALUE: 25
PIF_VALUE: 27
PIF_VALUE: 28
PIF_VALUE: 33
PIF_VALUE: 40
PIF_VALUE: 23
PIF_VALUE: 27
PIF_VALUE: 46
PIF_VALUE: 33
PIF_VALUE: 38
PIF_VALUE: 32
PIF_VALUE: 48
PIF_VALUE: 36
PIF_VALUE: 27
PIF_VALUE: 24
PIF_VALUE: 27
PIF_VALUE: 25
PIF_VALUE: 27
PIF_VALUE: 51
PIF_VALUE: 25
PIF_VALUE: 26
PIF_VALUE: 26
PIF_VALUE: 46
PIF_VALUE: 34
PIF_VALUE: 30
PIF_VALUE: 25
PIF_VALUE: 27
PIF_VALUE: 29
PIF_VALUE: 24
PIF_VALUE: 25
PIF_VALUE: 34
PIF_VALUE: 28
PIF_VALUE: 39
PIF_VALUE: 32
PIF_VALUE: 29
PIF_VALUE: 28
PIF_VALUE: 35
PIF_VALUE: 70
PIF_VALUE: 31
PIF_VALUE: 25
PIF_VALUE: 23
PIF_VALUE: 25
PIF_VALUE: 27
PIF_VALUE: 50
PIF_VALUE: 45
PIF_VALUE: 29
PIF_VALUE: 26
PIF_VALUE: 24
PIF_VALUE: 24
PIF_VALUE: 27
PIF_VALUE: 23
PIF_VALUE: 47
PIF_VALUE: 28
PIF_VALUE: 36
PIF_VALUE: 25
PIF_VALUE: 31
PIF_VALUE: 24
PIF_VALUE: 26
PIF_VALUE: 25
PIF_VALUE: 34
PIF_VALUE: 36
PIF_VALUE: 35
PIF_VALUE: 25
PIF_VALUE: 37
PIF_VALUE: 11
PIF_VALUE: 27
PIF_VALUE: 31
PIF_VALUE: 35
PIF_VALUE: 34
PIF_VALUE: 25
PIF_VALUE: 33
PIF_VALUE: 23
PIF_VALUE: 34
PIF_VALUE: 30
PIF_VALUE: 33
PIF_VALUE: 24
PIF_VALUE: 32
PIF_VALUE: 27
PIF_VALUE: 26
PIF_VALUE: 29
PIF_VALUE: 24
PIF_VALUE: 28
PIF_VALUE: 27
PIF_VALUE: 39
PIF_VALUE: 36
PIF_VALUE: 24
PIF_VALUE: 40
PIF_VALUE: 35
PIF_VALUE: 23
PIF_VALUE: 22
PIF_VALUE: 29
PIF_VALUE: 36
PIF_VALUE: 28
PIF_VALUE: 26
PIF_VALUE: 28
PIF_VALUE: 33
PIF_VALUE: 26
PIF_VALUE: 41
PIF_VALUE: 31
PIF_VALUE: 28
PIF_VALUE: 40
PIF_VALUE: 29
PIF_VALUE: 24
PIF_VALUE: 25
PIF_VALUE: 35
PIF_VALUE: 27
PIF_VALUE: 26
PIF_VALUE: 27
PIF_VALUE: 29
PIF_VALUE: 25
PIF_VALUE: 33
PIF_VALUE: 24
PIF_VALUE: 36
PIF_VALUE: 27
PIF_VALUE: 27
PIF_VALUE: 37
PIF_VALUE: 37
PIF_VALUE: 26
PIF_VALUE: 31
PIF_VALUE: 28
PIF_VALUE: 26
PIF_VALUE: 31
PIF_VALUE: 24
PIF_VALUE: 34
PIF_VALUE: 26
PIF_VALUE: 34
PIF_VALUE: 27
PIF_VALUE: 33
PIF_VALUE: 64
PIF_VALUE: 27
PIF_VALUE: 32
PIF_VALUE: 25
PIF_VALUE: 33
PIF_VALUE: 26
PIF_VALUE: 43
PIF_VALUE: 34
PIF_VALUE: 38
PIF_VALUE: 33
PIF_VALUE: 26
PIF_VALUE: 23
PIF_VALUE: 31
PIF_VALUE: 34
PIF_VALUE: 39
PIF_VALUE: 25
PIF_VALUE: 32
PIF_VALUE: 22
PIF_VALUE: 31
PIF_VALUE: 26
PIF_VALUE: 29
PIF_VALUE: 44
PIF_VALUE: 28
PIF_VALUE: 25
PIF_VALUE: 23
PIF_VALUE: 35
PIF_VALUE: 23
PIF_VALUE: 29
PIF_VALUE: 25
PIF_VALUE: 23
PIF_VALUE: 24
PIF_VALUE: 26
PIF_VALUE: 26
PIF_VALUE: 28
PIF_VALUE: 36
PIF_VALUE: 32
PIF_VALUE: 30
PIF_VALUE: 35
PIF_VALUE: 29
PIF_VALUE: 36
PIF_VALUE: 27
PIF_VALUE: 28
PIF_VALUE: 33
PIF_VALUE: 25
PIF_VALUE: 30
PIF_VALUE: 35
PIF_VALUE: 27
PIF_VALUE: 27
PIF_VALUE: 26
PIF_VALUE: 28
PIF_VALUE: 33
PIF_VALUE: 36
PIF_VALUE: 22
PIF_VALUE: 38
PIF_VALUE: 24
PIF_VALUE: 48
PIF_VALUE: 32
PIF_VALUE: 33
PIF_VALUE: 31
PIF_VALUE: 28
PIF_VALUE: 42
PIF_VALUE: 36
PIF_VALUE: 24
PIF_VALUE: 26
PIF_VALUE: 34
PIF_VALUE: 33
PIF_VALUE: 49
PIF_VALUE: 23
PIF_VALUE: 29
PIF_VALUE: 34
PIF_VALUE: 32
PIF_VALUE: 25
PIF_VALUE: 38
PIF_VALUE: 38
PIF_VALUE: 22
PIF_VALUE: 36
PIF_VALUE: 31
PIF_VALUE: 28
PIF_VALUE: 37
PIF_VALUE: 31
PIF_VALUE: 27
PIF_VALUE: 10
PIF_VALUE: 29
PIF_VALUE: 35
PIF_VALUE: 25
PIF_VALUE: 25
PIF_VALUE: 42
PIF_VALUE: 36
PIF_VALUE: 38
PIF_VALUE: 28
PIF_VALUE: 31
PIF_VALUE: 33
PIF_VALUE: 30
PIF_VALUE: 27
PIF_VALUE: 38
PIF_VALUE: 35
PIF_VALUE: 25
PIF_VALUE: 30
PIF_VALUE: 32
PIF_VALUE: 42
PIF_VALUE: 70
PIF_VALUE: 70
PIF_VALUE: 35
PIF_VALUE: 24
PIF_VALUE: 27
PIF_VALUE: 24
PIF_VALUE: 27
PIF_VALUE: 34
PIF_VALUE: 25
PIF_VALUE: 24
PIF_VALUE: 26
PIF_VALUE: 26
PIF_VALUE: 27
PIF_VALUE: 36
PIF_VALUE: 24
PIF_VALUE: 36
PIF_VALUE: 37
PIF_VALUE: 32
PIF_VALUE: 27
PIF_VALUE: 33
PIF_VALUE: 29
PIF_VALUE: 31
PIF_VALUE: 29
PIF_VALUE: 28
PIF_VALUE: 30
PIF_VALUE: 23
PIF_VALUE: 31
PIF_VALUE: 23
PIF_VALUE: 25
PIF_VALUE: 29
PIF_VALUE: 27
PIF_VALUE: 27
PIF_VALUE: 45
PIF_VALUE: 30
PIF_VALUE: 26
PIF_VALUE: 30
PIF_VALUE: 28
PIF_VALUE: 32
PIF_VALUE: 35
PIF_VALUE: 33
PIF_VALUE: 29
PIF_VALUE: 30
PIF_VALUE: 25
PIF_VALUE: 24
PIF_VALUE: 70
PIF_VALUE: 40
PIF_VALUE: 33
PIF_VALUE: 21
PIF_VALUE: 37
PIF_VALUE: 38
PIF_VALUE: 24
PIF_VALUE: 28
PIF_VALUE: 25
PIF_VALUE: 27
PIF_VALUE: 26
PIF_VALUE: 24
PIF_VALUE: 24
PIF_VALUE: 26
PIF_VALUE: 35
PIF_VALUE: 25
PIF_VALUE: 33
PIF_VALUE: 26
PIF_VALUE: 26
PIF_VALUE: 23
PIF_VALUE: 28
PIF_VALUE: 34
PIF_VALUE: 29
PIF_VALUE: 24
PIF_VALUE: 31
PIF_VALUE: 30
PIF_VALUE: 24
PIF_VALUE: 25
PIF_VALUE: 32
PIF_VALUE: 26
PIF_VALUE: 41
PIF_VALUE: 21
PIF_VALUE: 25
PIF_VALUE: 26
PIF_VALUE: 32
PIF_VALUE: 31
PIF_VALUE: 35
PIF_VALUE: 32
PIF_VALUE: 33
PIF_VALUE: 34
PIF_VALUE: 38
PIF_VALUE: 39
PIF_VALUE: 31
PIF_VALUE: 25
PIF_VALUE: 24
PIF_VALUE: 26
PIF_VALUE: 35
PIF_VALUE: 26
PIF_VALUE: 30
PIF_VALUE: 30

## 2020-01-01 ASSESSMENT — ENCOUNTER SYMPTOMS
ABDOMINAL DISTENTION: 1
APNEA: 0
PHOTOPHOBIA: 0
SHORTNESS OF BREATH: 1
COUGH: 0
EYE DISCHARGE: 0
EYE ITCHING: 0
COLOR CHANGE: 0
BACK PAIN: 0

## 2020-01-01 ASSESSMENT — PAIN SCALES - GENERAL
PAINLEVEL_OUTOF10: 0
PAINLEVEL_OUTOF10: 2
PAINLEVEL_OUTOF10: 2
PAINLEVEL_OUTOF10: 0
PAINLEVEL_OUTOF10: 4
PAINLEVEL_OUTOF10: 0
PAINLEVEL_OUTOF10: 0
PAINLEVEL_OUTOF10: 2
PAINLEVEL_OUTOF10: 0
PAINLEVEL_OUTOF10: 4
PAINLEVEL_OUTOF10: 0
PAINLEVEL_OUTOF10: 2
PAINLEVEL_OUTOF10: 0
PAINLEVEL_OUTOF10: 2
PAINLEVEL_OUTOF10: 0
PAINLEVEL_OUTOF10: 2
PAINLEVEL_OUTOF10: 4
PAINLEVEL_OUTOF10: 2
PAINLEVEL_OUTOF10: 0
PAINLEVEL_OUTOF10: 2
PAINLEVEL_OUTOF10: 0
PAINLEVEL_OUTOF10: 1

## 2020-05-05 RX ORDER — POTASSIUM CHLORIDE 750 MG/1
10 TABLET, EXTENDED RELEASE ORAL DAILY
COMMUNITY

## 2020-05-05 RX ORDER — FUROSEMIDE 40 MG/1
40 TABLET ORAL 2 TIMES DAILY
COMMUNITY

## 2020-05-05 RX ORDER — PANTOPRAZOLE SODIUM 40 MG/1
40 TABLET, DELAYED RELEASE ORAL DAILY
COMMUNITY

## 2020-05-05 RX ORDER — ALBUTEROL SULFATE 90 UG/1
2 AEROSOL, METERED RESPIRATORY (INHALATION) EVERY 4 HOURS PRN
COMMUNITY

## 2020-05-05 RX ORDER — AMLODIPINE BESYLATE 5 MG/1
5 TABLET ORAL DAILY
COMMUNITY

## 2020-05-05 RX ORDER — CARVEDILOL 6.25 MG/1
6.25 TABLET ORAL 2 TIMES DAILY WITH MEALS
COMMUNITY

## 2020-05-05 RX ORDER — SENNOSIDES 8.6 MG
650 CAPSULE ORAL 4 TIMES DAILY PRN
COMMUNITY

## 2020-05-05 RX ORDER — PRAVASTATIN SODIUM 20 MG
20 TABLET ORAL DAILY
COMMUNITY

## 2020-05-05 RX ORDER — BUDESONIDE AND FORMOTEROL FUMARATE DIHYDRATE 160; 4.5 UG/1; UG/1
2 AEROSOL RESPIRATORY (INHALATION)
COMMUNITY

## 2020-05-30 NOTE — ED NOTES
Paul PIERCE talked with patient about BP. Per PA \"Pt has not had any of his regular medications today but wants to manage his blood pressure at home\".       Lionel Holguin RN  05/30/20 5187

## 2020-05-31 PROBLEM — I50.23 CHF (CONGESTIVE HEART FAILURE), NYHA CLASS IV, ACUTE ON CHRONIC, SYSTOLIC (HCC): Status: ACTIVE | Noted: 2020-01-01

## 2020-05-31 NOTE — ED NOTES
Pt arrived via [address] [facility] in labored breathing. Pt O2 on room air of 60. Pt placed on a non re breather 15%. Pt is now at 89%.  20G Left hand IV from EMS, 20G in left FA, 20 G in the right hand.          Williams Matamoros, RN          Williams Matamoros, RN  05/31/20 1784

## 2020-05-31 NOTE — ED NOTES
Pt being intubated by REECE Rahman, RT at bedside. 20mg etomidate @1213  Succs 100mg @1214    Intubation @1215  7 1/2, 22 at the lip.         Deniz Mccray RN  05/31/20 1612

## 2020-05-31 NOTE — PROGRESS NOTES
Contains critical data BLOOD GAS, ARTERIAL   Status:  Final result    Ref Range & Units 05/31/20 1200   pH, Arterial 7.350 - 7.450 7.080Low Panic     pCO2, Arterial 35.0 - 45.0 mmHg 66.0High     pO2, Arterial 80.0 - 100.0 mmHg 76. 0Low     HCO3, Arterial 22.0 - 26.0 mmol/L 19.6Low     Base Excess, Arterial -2.0 - 2.0 mmol/L -11.6Low     Hemoglobin, Art, Extended 14.0 - 18.0 g/dL 16.1    O2 Sat, Arterial >92 % 90.6    Carboxyhgb, Arterial 0.0 - 5.0 % 2.9    Comment:      0.0-1.5   (Smokers 1.5-5.0)    Methemoglobin, Arterial <1.5 % 0.7    O2 Content, Arterial Not Established mL/dL 20.5    O2 Therapy  Unknown    Resulting Agency  [address] [facility]   Narrative     CALL  [facility] tel. ,  Dr. Ryan Lange, 05/31/2020 12:36, by FILI        Specimen Collected: 05/31/20 1200 Last Resulted: 05/31/20 1236 View Other Order Details        Patient on 100% NRB. Site of choice left brachial.  Labs also obtained att.

## 2020-05-31 NOTE — ED PROVIDER NOTES
Negative for behavioral problems, hallucinations and suicidal ideas. All other systems reviewed and are negative.        A complete review of systems was performed and is negative except as noted above in the HPI. A complete review of systems was performed and is negative except as noted above in the HPI. PAST MEDICAL HISTORY     Past Medical History:   Diagnosis Date    CAD (coronary artery disease) 8/19/2013    10/22/2004  Stent to LAD 12/17/2004  Cath  Patent stent in the LAD, normal LVFX   6/9/2005  Cath  Patent stent in the LAD, normal LVFX 9/19/2005  cardiolite negative for myocardial ischemia 7/24/2006  Non Q wave MI 7/24/2006  PTCA to diagonal 11/7/2007  CABG x 2 LIMA-LAD, VG-diagonal 11/2/2011  Cath  Patent LIMA-LAD, patent VG-diag, normal LVFX 3/18/2013  lexiscan  Positive for anterior and inferi    Cigarette smoker 8/19/2013    Hypercholesteremia 8/19/2013    Hypertension 8/19/2013         SURGICAL HISTORY       Past Surgical History:   Procedure Laterality Date    CARDIAC CATHETERIZATION  8/19/13  JDT    with stent. EF 60%    CORONARY ARTERY BYPASS GRAFT  11/7/2007    Dr. Donna Helton       Previous Medications    ALBUTEROL SULFATE HFA (VENTOLIN HFA) 108 (90 BASE) MCG/ACT INHALER    Inhale 2 puffs into the lungs every 6 hours as needed for Wheezing    ASPIRIN 81 MG TABLET    Take 81 mg by mouth daily.     BUDESONIDE-FORMOTEROL (SYMBICORT) 160-4.5 MCG/ACT AERO    Inhale 2 puffs into the lungs 2 times daily    CARVEDILOL (COREG) 6.25 MG TABLET    Take 6.25 mg by mouth 2 times daily (with meals)    CLONIDINE (CATAPRES) 0.1 MG TABLET    Take 1 tablet by mouth 2 times daily If blood pressure greater than 195/95    FUROSEMIDE (LASIX) 20 MG TABLET    Take 20 mg by mouth 2 times daily    HYDROCHLOROTHIAZIDE (HYDRODIURIL) 25 MG TABLET    Take 25 mg by mouth daily     LISINOPRIL (PRINIVIL) 20 MG TABLET    Take 1 tablet by mouth daily    METOPROLOL TARTRATE (LOPRESSOR) 25 MG TABLET    Take 1 tablet by mouth 2 times daily    NITROGLYCERIN (NITROSTAT) 0.4 MG SL TABLET    Place 1 tablet under the tongue every 5 minutes as needed for Chest pain    PANTOPRAZOLE (PROTONIX) 40 MG TABLET    Take 40 mg by mouth daily    POTASSIUM CHLORIDE (KLOR-CON) 20 MEQ PACKET    Take 20 mEq by mouth 2 times daily    PRAVASTATIN (PRAVACHOL) 40 MG TABLET    Take 1 tablet by mouth daily    RIVAROXABAN (XARELTO) 10 MG TABS TABLET    Take 1 tablet by mouth daily (with breakfast)       ALLERGIES     Sulfa antibiotics and Iv contrast [iodides]    FAMILY HISTORY       Family History   Problem Relation Age of Onset    High Blood Pressure Mother     High Blood Pressure Father     Cancer Sister           SOCIAL HISTORY       Social History     Socioeconomic History    Marital status:      Spouse name: None    Number of children: None    Years of education: None    Highest education level: None   Occupational History    None   Social Needs    Financial resource strain: None    Food insecurity     Worry: None     Inability: None    Transportation needs     Medical: None     Non-medical: None   Tobacco Use    Smoking status: Current Every Day Smoker     Packs/day: 0.50     Years: 14.00     Pack years: 7.00    Smokeless tobacco: Never Used   Substance and Sexual Activity    Alcohol use: Yes     Comment: occas---once/year    Drug use: No    Sexual activity: None   Lifestyle    Physical activity     Days per week: None     Minutes per session: None    Stress: None   Relationships    Social connections     Talks on phone: None     Gets together: None     Attends Rastafari service: None     Active member of club or organization: None     Attends meetings of clubs or organizations: None     Relationship status: None    Intimate partner violence     Fear of current or ex partner: None     Emotionally abused: None     Physically abused: None     Forced sexual activity: None not returned as of this dictation. EMERGENCY DEPARTMENT COURSE and DIFFERENTIALDIAGNOSIS/MDM:   Vitals:    Vitals:    05/31/20 1140 05/31/20 1224 05/31/20 1237 05/31/20 1242   BP: (!) 164/151  (!) 114/96 (!) 109/90   Pulse: 119 114 102 101   Resp: (!) 34 16 14 14   SpO2: (!) 80% 93% 95% 93%   Weight: 159 lb (72.1 kg)      Height: 5' 9\" (1.753 m)          MDM      CONSULTS:  IP CONSULT TO HOSPITALIST  Discussed the case with Dr. Miles Stahl. PROCEDURES:  Unless otherwise notedbelow, none     Intubation  Date/Time: 5/31/2020 12:49 PM  Performed by: Simon Dailey MD  Authorized by: Simon Dailey MD     Consent:     Consent obtained:  Emergent situation and verbal    Consent given by:  Patient  Pre-procedure details:     Patient status:  Awake    Mallampati score:  III    Pretreatment meds: Etomidate. Paralytics:  Succinylcholine  Procedure details:     Preoxygenation:  Nonrebreather mask    CPR in progress: no      Intubation method:  Oral    Laryngoscope blade: Mac 4    Tube size (mm):  7.5    Tube type:  Cuffed    Number of attempts:  1    Tube visualized through cords: yes    Placement assessment:     Tube secured with:  ETT mobley    Breath sounds:  Equal    Placement verification: chest rise, condensation, CXR verification, direct visualization, equal breath sounds, ETCO2 detector and tube exhalation      CXR findings:  ETT in proper place  Post-procedure details:     Patient tolerance of procedure: Tolerated well, no immediate complications        FINAL IMPRESSION     1. Acute respiratory failure with hypoxia and hypercapnia (HCC)    2.  Acute pulmonary edema ([address]          DISPOSITION/PLAN   DISPOSITION Decision To Admit 05/31/2020 12:38:50 PM      PATIENT REFERRED TO:  @FUP@    DISCHARGE MEDICATIONS:  New Prescriptions    No medications on file          (Please note that portions of this note were completed with a voice recognition program.  Efforts were made to edit the dictations

## 2020-05-31 NOTE — ED NOTES
Results for Anitra Gonzalez (MRN [id number]) as of 5/31/2020 12:37   Ref.  Range 5/30/2020 06:23 5/30/2020 10:24 5/30/2020 11:04 5/30/2020 16:05 5/31/2020 11:58   Hemoglobin, Art, Extended Latest Ref Range: 14.0 - 18.0 g/dL     16.1   pH, Arterial Latest Ref Range: 7.350 - 7.450      7.080 (LL)   pCO2, Arterial Latest Ref Range: 35.0 - 45.0 mmHg     66.0 (H)   pO2, Arterial Latest Ref Range: 80.0 - 100.0 mmHg     76.0 (L)   HCO3, Arterial Latest Ref Range: 22.0 - 26.0 mmol/L     19.6 (L)   Base Excess, Arterial Latest Ref Range: -2.0 - 2.0 mmol/L     -11.6 (L)   O2 Sat, Arterial Latest Ref Range: >92 %     90.6   O2 Content, Arterial Latest Ref Range: Not Established mL/dL     20.5   Methemoglobin, Arterial Latest Ref Range: <1.5 %     0.7   Carboxyhgb, Arterial Latest Ref Range: 0.0 - 5.0 %     2.9        Allen Mcfarland, KHADIJAH  05/31/20 4241

## 2020-05-31 NOTE — ED NOTES
Increased prop to 20mcg.  [facility] order of 10mg Vec.      Christie Freed RN  05/31/20 2168

## 2020-05-31 NOTE — ED NOTES
Dr. Aquino Host at bedside with patient and patient's family member Gina Olivera).      Aisha Cohen RN  05/31/20 2466

## 2020-06-01 NOTE — ED PROVIDER NOTES
[facility] EMERGENCY DEPT  eMERGENCYdEPARTMENT eNCOUnter      Pt Name: Hayley Torrez  MRN: [id number]  [place] 1955  Date of evaluation: 5/30/2020  Provider:KARI Nunez    CHIEF COMPLAINT       Chief Complaint   Patient presents with    Leg Swelling     bilateral arm and leg swelling and abdominal bloating. states he has fluid building up         HISTORY OF PRESENT ILLNESS  (Location/Symptom, Timing/Onset, Context/Setting, Quality, Duration, Modifying Factors, Severity.)   Hayley Torrez is a 71 y.o. male who presents to the emergency department with complaints of bilateral feet and hand swelling. He also feels as though he has some abdominal bloating he denies any shortness of breath his respirations are 23-minute no fever oxygen is 100% he denies any chest pain. He does admit to some dyspnea on exertion but denies any chest pain has a history of CHF he had a admission to [facility] a few weeks ago and concern findings for possible failed echocardiogram.  He states that he takes Lasix as prescribed denies any smoking to me denies any fever. He is waiting to see Dr. Alex Aguilar with local cardiology group. He has a positive stent history from 2013. HPI    Nursing Notes were reviewed and I agree. REVIEW OF SYSTEMS    (2-9 systems for level 4, 10 or more for level 5)     Review of Systems   Constitutional: Negative for activity change, appetite change, chills and fever. HENT: Negative for congestion and dental problem. Eyes: Negative for photophobia, discharge and itching. Respiratory: Positive for shortness of breath. Negative for apnea and cough. Cardiovascular: Negative for chest pain. Gastrointestinal: Positive for abdominal distention. Musculoskeletal: Positive for joint swelling. Negative for arthralgias, back pain, gait problem, myalgias and neck pain. Skin: Negative for color change, pallor and rash. Neurological: Negative for dizziness, seizures and syncope. mouth daily Historical Med      aspirin 81 MG tablet Take 81 mg by mouth daily.       nitroGLYCERIN (NITROSTAT) 0.4 MG SL tablet Place 1 tablet under the tongue every 5 minutes as needed for Chest pain, Disp-25 tablet, R-0Print      rivaroxaban (XARELTO) 10 MG TABS tablet Take 1 tablet by mouth daily (with breakfast), Disp-30 tablet, R-0Print      pravastatin (PRAVACHOL) 40 MG tablet Take 1 tablet by mouth daily, Disp-30 tablet, R-3Print      budesonide-formoterol (SYMBICORT) 160-4.5 MCG/ACT AERO Inhale 2 puffs into the lungs 2 times daily, Disp-1 Inhaler, R-0Print      albuterol sulfate HFA (VENTOLIN HFA) 108 (90 Base) MCG/ACT inhaler Inhale 2 puffs into the lungs every 6 hours as needed for Wheezing, Disp-1 Inhaler, R-0Print             ALLERGIES     Sulfa antibiotics and Iv contrast [iodides]    FAMILY HISTORY       Family History   Problem Relation Age of Onset    High Blood Pressure Mother     High Blood Pressure Father     Cancer Sister           SOCIAL HISTORY       Social History     Socioeconomic History    Marital status:      Spouse name: None    Number of children: None    Years of education: None    Highest education level: None   Occupational History    None   Social Needs    Financial resource strain: None    Food insecurity     Worry: None     Inability: None    Transportation needs     Medical: None     Non-medical: None   Tobacco Use    Smoking status: Current Every Day Smoker     Packs/day: 0.50     Years: 14.00     Pack years: 7.00    Smokeless tobacco: Never Used   Substance and Sexual Activity    Alcohol use: Yes     Comment: occas---once/year    Drug use: No    Sexual activity: None   Lifestyle    Physical activity     Days per week: None     Minutes per session: None    Stress: None   Relationships    Social connections     Talks on phone: None     Gets together: None     Attends [employer] service: None     Active member of club or organization: None     Attends meetings of clubs or organizations: None     Relationship status: None    Intimate partner violence     Fear of current or ex partner: None     Emotionally abused: None     Physically abused: None     Forced sexual activity: None   Other Topics Concern    None   Social History Narrative    ** Merged History Encounter **            SCREENINGS           PHYSICAL EXAM    (up to 7 forlevel 4, 8 or more for level 5)     ED Triage Vitals [05/30/20 1357]   BP Temp Temp src Pulse Resp SpO2 Height Weight   (!) 187/103 98.1 °F (36.7 °C) -- 72 18 99 % 5' 9\" (1.753 m) 159 lb (72.1 kg)       Physical Exam  Vitals signs and nursing note reviewed. Constitutional:       General: He is not in acute distress. Appearance: Normal appearance. He is well-developed and normal weight. He is not diaphoretic. HENT:      Head: Normocephalic and atraumatic. Right Ear: Tympanic membrane, ear canal and external ear normal.      Left Ear: External ear normal.      Nose: Nose normal.      Mouth/Throat:      Mouth: Mucous membranes are moist.   Eyes:      Pupils: Pupils are equal, round, and reactive to light. Neck:      Musculoskeletal: Normal range of motion and neck supple. Trachea: No tracheal deviation. Cardiovascular:      Rate and Rhythm: Normal rate and regular rhythm. Pulses: Normal pulses. Heart sounds: Normal heart sounds. No murmur. Pulmonary:      Effort: Pulmonary effort is normal.      Breath sounds: Normal breath sounds. No stridor. No wheezing. Chest:      Chest wall: No tenderness. Abdominal:      General: Abdomen is flat. Bowel sounds are normal. There is no distension. Palpations: Abdomen is soft. Tenderness: There is no abdominal tenderness. Musculoskeletal: Normal range of motion. Skin:     General: Skin is warm and dry. Capillary Refill: Capillary refill takes less than 2 seconds. Neurological:      General: No focal deficit present.       Mental Status: He is alert and oriented to person, place, and time. Mental status is at baseline. Psychiatric:         Mood and Affect: Mood normal.         Behavior: Behavior normal.         Thought Content: Thought content normal.         Judgment: Judgment normal.           DIAGNOSTIC RESULTS     RADIOLOGY:   Non-plain film images such as CT, Ultrasound and MRI are read by the radiologist. Plain radiographic images are visualized and preliminarilyinterpreted by No att. providers found with the below findings:      Interpretation per the Radiologist below, if available at the time of this note:    XR CHEST PORTABLE   Final Result   1. Question of mild interstitial edema in the right lung base. 2. Cardiomegaly. 3. No dense consolidation. Signed by Dr Ronaldo Short on 5/30/2020 3:17 PM          LABS:  Labs Reviewed   CBC WITH AUTO DIFFERENTIAL - Abnormal; Notable for the following components:       Result Value    Hemoglobin 13.9 (*)     RDW 14.6 (*)     All other components within normal limits   BRAIN NATRIURETIC PEPTIDE - Abnormal; Notable for the following components:    Pro-BNP 2,528 (*)     All other components within normal limits   COMPREHENSIVE METABOLIC PANEL   TROPONIN   PROTIME-INR   APTT       All other labs were within normal range or notreturned as of this dictation. RE-ASSESSMENT        EMERGENCY DEPARTMENT COURSE and DIFFERENTIAL DIAGNOSIS/MDM:   Vitals:    Vitals:    05/30/20 1736 05/30/20 1758 05/30/20 1803 05/30/20 1826   BP: (!) 174/112 (!) 173/122 (!) 178/120 (!) 170/130   Pulse: 80 83 83 82   Resp: 17 22 23 23   Temp:       SpO2:       Weight:       Height:           MDM  Overall patient's findings are unremarkable.   There is a concern for some fluid on his lung on x-ray I advised we can try some Lasix or get him admitted the patient wants to try to double the dose of his Lasix and go home he has no respiratory distress here this was a complaint mentioned but he remains medically stable with good oxygenation and no respiratory distress on physical exam clinically. I advised with his history I would feel appropriate to admit him which he declines wants to try going home I advised him to return very cautiously should any symptoms worsen. Overall his lab work is unimpressive aside from the elevated BNP which I think is playing a role from the known CHF he needs to get some of this fluid off is really I think his biggest problem. Dr. Saint Clair my cosigner is agreeable to this plan we will discharge at this time. PROCEDURES:    Procedures      FINAL IMPRESSION      1. Leg swelling    2. Peripheral edema    3. Chronic pulmonary edema    4.  Dyspnea, unspecified type          DISPOSITION/PLAN   DISPOSITION Decision To Discharge 05/30/2020 06:30:28 PM      PATIENT REFERRED TO:  [place] [id number]  [phone]    Call   Monday      DISCHARGE MEDICATIONS:  Discharge Medication List as of 5/30/2020  6:29 PM          (Please note that portions of this note were completed with a voice recognition program.  Efforts were made to edit the dictations but occasionallywords are mis-transcribed.)    Katlyn Black 013, 1520 Misael Mcfadden  05/31/20 0294

## 2020-06-01 NOTE — H&P
[facility] Medicine H&P    Patient:  Clarisse Sequeira  MRN: [id number]    Consulting Physician: Jonel Lynn DO  Reason for Consult: Medical Management  [facility] Physician: JANNET Mcclellan    HISTORY OF PRESENT ILLNESS:   The patient is a 71 y.o. male presents with worsening shortness of breath. He has a longstanding history of coronary artery disease. He has known systolic dysfunction. He presented to the emergency department yesterday with some shortness of breath. He received some intravenous Lasix. He felt better. He was discharged from the ED. He presented back late this morning and was in florid pulmonary edema. He was intubated because of hypoxemic respiratory failure. He will be admitted to CCU for further management. Past Medical History:        Diagnosis Date    CAD (coronary artery disease) 8/19/2013    10/22/2004  Stent to LAD 12/17/2004  Cath  Patent stent in the LAD, normal LVFX   6/9/2005  Cath  Patent stent in the LAD, normal LVFX 9/19/2005  cardiolite negative for myocardial ischemia 7/24/2006  Non Q wave MI 7/24/2006  PTCA to diagonal 11/7/2007  CABG x 2 LIMA-LAD, VG-diagonal 11/2/2011  Cath  Patent LIMA-LAD, patent VG-diag, normal LVFX 3/18/2013  lexiscan  Positive for anterior and inferi    Cigarette smoker 8/19/2013    Hypercholesteremia 8/19/2013    Hypertension 8/19/2013       Past Surgical History:        Procedure Laterality Date    CARDIAC CATHETERIZATION  8/19/13  JDT    with stent.  EF 60%    CORONARY ARTERY BYPASS GRAFT  11/7/2007    Dr. Glen Willingham         Medications: Scheduled Meds:   famotidine (PEPCID) injection  20 mg Intravenous Daily    aspirin  81 mg Oral Daily    chlorhexidine  15 mL Mouth/Throat BID     Continuous Infusions:   nitroGLYCERIN Stopped (05/31/20 1550)    propofol 40 mcg/kg/min (06/01/20 1420)     PRN Meds:.perflutren lipid microspheres    Allergies:  Sulfa antibiotics and Iv contrast [iodides]    Social History: TOBACCO:   reports that he has been smoking. He has a 7.00 pack-year smoking history. He has never used smokeless tobacco.  ETOH:   reports current alcohol use. Family History:       Problem Relation Age of Onset    High Blood Pressure Mother     High Blood Pressure Father     Cancer Sister        ROS:  Review of Systems   Unable to perform ROS: Intubated       Physical Exam:    Vitals: BP (!) 159/107   Pulse 115   Temp 97.9 °F (36.6 °C) (Temporal)   Resp 29   Ht 5' 9\" (1.753 m)   Wt 155 lb 12.8 oz (70.7 kg)   SpO2 95%   BMI 23.01 kg/m²     Physical Exam  Vitals signs reviewed. Constitutional:       Appearance: He is well-developed. HENT:      Head: Normocephalic and atraumatic. Eyes:      Conjunctiva/sclera: Conjunctivae normal.      Pupils: Pupils are equal, round, and reactive to light. Cardiovascular:      Rate and Rhythm: Normal rate and regular rhythm. Heart sounds: Normal heart sounds. Pulmonary:      Effort: Pulmonary effort is normal.      Breath sounds: Rales present. Abdominal:      General: Abdomen is flat. Palpations: Abdomen is soft. Skin:     General: Skin is warm and dry. CBC:   Recent Labs     05/30/20  1426 05/31/20  1155   WBC 8.8 18.2*   HGB 13.9* 15.5    206     BMP:    Recent Labs     05/30/20  1426 05/31/20  1155 05/31/20  1200 05/31/20  1254 06/01/20  0324    138  --   --  139   K 3.8 4.3 4.2 4.1 4.5    104  --   --  101   CO2 25 19*  --   --  21*   BUN 8 10  --   --  20   CREATININE 1.2 1.4*  --   --  2.1*   GLUCOSE 97 231*  --   --  87     Hepatic:   Recent Labs     05/30/20  1426 05/31/20  1155   AST 16 27   ALT 14 23   BILITOT 0.8 1.0   ALKPHOS 90 125     Troponin:   Recent Labs     05/31/20  1155 05/31/20  1731 06/01/20  0324   TROPONINI <0.01 0.03 0.03     BNP: No results for input(s): BNP in the last 72 hours. Lipids: No results for input(s): CHOL, HDL in the last 72 hours.     Invalid input(s): LDLCALCU  ABGs:   Lab

## 2020-06-02 NOTE — CONSULTS
Used   Substance and Sexual Activity    Alcohol use: Yes     Comment: occas---once/year    Drug use: No    Sexual activity: Not on file   Lifestyle    Physical activity     Days per week: Not on file     Minutes per session: Not on file    Stress: Not on file   Relationships    Social connections     Talks on phone: Not on file     Gets together: Not on file     Attends Mormonism service: Not on file     Active member of club or organization: Not on file     Attends meetings of clubs or organizations: Not on file     Relationship status: Not on file    Intimate partner violence     Fear of current or ex partner: Not on file     Emotionally abused: Not on file     Physically abused: Not on file     Forced sexual activity: Not on file   Other Topics Concern    Not on file   Social History Narrative    ** Merged History Encounter **              Review of Systems:  Review of system is not possible as he is intubated and sedated.   Objective:  Patient Vitals for the past 24 hrs:   BP Temp Temp src Pulse Resp SpO2 Weight   06/02/20 1600 102/66 98.3 °F (36.8 °C) Temporal 122 24 94 % --   06/02/20 1500 (!) 134/104 -- -- 110 21 96 % --   06/02/20 1409 -- -- -- 106 22 94 % --   06/02/20 1400 95/76 -- -- 107 20 94 % --   06/02/20 1300 123/87 -- -- 119 19 95 % --   06/02/20 1200 (!) 145/105 98.1 °F (36.7 °C) Temporal 119 20 93 % --   06/02/20 1100 (!) 127/92 -- -- 120 21 94 % --   06/02/20 1006 -- -- -- 106 (!) 32 94 % --   06/02/20 1000 109/75 -- -- 113 19 94 % --   06/02/20 0900 -- -- -- 111 26 -- --   06/02/20 0800 100/81 98.3 °F (36.8 °C) Temporal 113 25 94 % --   06/02/20 0754 -- -- -- 110 -- -- --   06/02/20 0728 -- -- -- 108 -- -- --   06/02/20 0700 (!) 122/90 -- -- 112 19 92 % --   06/02/20 0641 -- -- -- 110 26 93 % --   06/02/20 0600 120/85 -- -- 109 22 93 % --   06/02/20 0500 110/85 -- -- 100 23 94 % --   06/02/20 0400 107/64 98 °F (36.7 °C) Temporal 98 25 94 % 151 lb 6.4 oz (68.7 kg)   06/02/20 0300 100/72 --

## 2020-06-02 NOTE — PROGRESS NOTES
Nutrition Assessment    Type and Reason for Visit: Initial, Consult    Nutrition Recommendations: follow for extubation and starting po diet    Nutrition Assessment: Received consult for CHF education. At present pt is NPO on vent. Aware had weaning trial yesterday and today--unable to remain off vent. Malnutrition Assessment:  · Malnutrition Status: At risk for malnutrition  · Context: Acute illness or injury  · Findings of the 6 clinical characteristics of malnutrition (Minimum of 2 out of 6 clinical characteristics is required to make the diagnosis of moderate or severe Protein Calorie Malnutrition based on AND/ASPEN Guidelines):  1. Energy Intake-Unable to assess, Unable to assess    2. Weight Loss-5% loss or greater, in 1 year  3. Fat Loss-No significant subcutaneous fat loss,    4. Muscle Loss-Mild muscle mass loss,    5. Fluid Accumulation-No significant fluid accumulation,    6.  Strength-Not measured    Nutrition Risk Level:  Moderate    Nutrient Needs:  · Estimated Daily Total Kcal: 8964-1089 kcals (20-25 kcals/kg)  · Estimated Daily Protein (g): 68-138g  · Estimated Daily Total Fluid (ml/day): 3601-6487 ml    Nutrition Diagnosis:   · Problem: Inadequate oral intake  · Etiology: related to Acute injury/trauma, Impaired respiratory function-inability to consume food     Signs and symptoms:  as evidenced by NPO status due to medical condition, Mild muscle loss    Objective Information:  · Nutrition-Focused Physical Findings:    · Wound Type: None  · Current Nutrition Therapies:  · Oral Diet Orders: NPO   · Oral Diet intake: NPO  · Oral Nutrition Supplement (ONS) Orders: None  · ONS intake: NPO     · Anthropometric Measures:  · Ht: 5' 9\" (175.3 cm)   · Current Body Wt: 151 lb 6 oz (68.7 kg)  · Admission Body Wt: 159 lb (72.1 kg)(stated)  · Usual Body Wt: 160 lb (72.6 kg)(8/2019)  · % Weight Change:  ,  5.3% weight decrease from UBW  · [place] Body Wt: 160 lb (72.6 kg), % Ideal Body 94.7%  · BMI

## 2020-06-02 NOTE — PROGRESS NOTES
Hospitalist Progress Note    Patient:  Joslyn Wiley  YOB: 1955  Date of Service: 6/2/2020  MRN: [id number]   Acct: [de-identified]   Primary Care Physician: JANNET Lund  Advance Directive: Prior  Admit Date: 5/31/2020       Hospital Day: 2  Referring Provider: Meli Garcias DO    Patient Seen, Chart, Consults, Notes, Labs, Radiology studies reviewed. Subjective: Joslyn Wiley is a 71 y.o. male  whom we are following for congestive heart failure, hypoxemic respiratory failure, acute kidney injury. We have attempted another weaning trial this morning and he became increasingly tachycardic and tachypneic. His creatinine is now up over 3. I suspect that he has developed some relative hypotension from when he came into the hospital after initially treating his CHF. His chest x-ray is much improved. I am going to give him a 500 cc bolus of normal saline. I will also ask nephrology for evaluation. Two-dimensional echocardiogram is pending. I would also like to have cardiology evaluate the patient as well.     Allergies:  Sulfa antibiotics and Iv contrast [iodides]    Medicines:  Current Facility-Administered Medications   Medication Dose Route Frequency Provider Last Rate Last Dose    0.9 % sodium chloride bolus  500 mL Intravenous Once Meli Garcias DO        famotidine (PEPCID) injection 20 mg  20 mg Intravenous Daily Meli Garcias DO   20 mg at 06/01/20 2033    metoprolol (LOPRESSOR) injection 5 mg  5 mg Intravenous Q6H Meli Garcias DO   5 mg at 06/02/20 0350    nitroGLYCERIN 50 mg in dextrose 5% 250 mL infusion  5 mcg/min Intravenous Continuous Meli Garcias DO   Stopped at 05/31/20 1550    aspirin chewable tablet 81 mg  81 mg Oral Daily Meli Garcias DO   Stopped at 06/01/20 1147    perflutren lipid microspheres (DEFINITY) injection 1.65 mg  1.5 mL Intravenous ONCE PRN Meli Garcias DO        propofol injection  10 mcg/kg/min

## 2020-06-02 NOTE — PROGRESS NOTES
Attempted weaning trial Patient had tachypenia and increased HR, Patient raising head up off bed and became sweaty.  Trial stopped , patient re-oriented and propofol restarted at 5mcg/kg/min

## 2020-06-03 ENCOUNTER — OUTSIDE FACILITY SERVICE (OUTPATIENT)
Dept: PULMONOLOGY | Facility: CLINIC | Age: 65
End: 2020-06-03

## 2020-06-03 PROCEDURE — 99223 1ST HOSP IP/OBS HIGH 75: CPT | Performed by: INTERNAL MEDICINE

## 2020-06-03 NOTE — CONSULTS
chlorhexidine, 15 mL, Mouth/Throat, BID   Social History   reports that he has been smoking. He has a 7.00 pack-year smoking history. He has never used smokeless tobacco. He reports current alcohol use. He reports that he does not use drugs. Family History  family history includes Cancer in his sister; High Blood Pressure in his father and mother. Review of Systems:  Cannot obtain due to mechanical ventilation  Physical Exam:   height is 5' 9\" (1.753 m) and weight is 155 lb 1.6 oz (70.4 kg). His temporal temperature is 98.5 °F (36.9 °C). His blood pressure is 136/74 and his pulse is 116. His respiration is 21 and oxygen saturation is 94%. Intake/Output Summary (Last 24 hours) at 6/3/2020 1023  Last data filed at 6/3/2020 0600  Gross per 24 hour   Intake 1988.22 ml   Output 260 ml   Net 1728.22 ml     Ventilator Settings:     Vent Mode: AC/VC/Vt Ordered: 600 mL/Rate Set: 14 bmp/Pressure Support: 5 cmH20/PEEP/CPAP: 7/FiO2 : 50 %/   Peak Inspiratory Pressure: 33 cmH2O  Mean Airway Pressure: 15 cmH20  I:E Ratio: 1:1.30  Rate Measured: 22 br/min  Minute Volume: 12 Liters           Physical Exam  Vitals signs and nursing note reviewed. Constitutional:       Comments: He is an elderly -American male who is intubated and sedated. HENT:      Head:      Comments: Endotracheal tube is in place. Eyes:      Pupils: Pupils are equal, round, and reactive to light. Neck:      Musculoskeletal: Normal range of motion. Cardiovascular:      Comments: He is tachycardic with a pulse of 130  Pulmonary:      Comments: A few coarse rhonchi are noted at the bases. Abdominal:      General: Abdomen is flat. Musculoskeletal:      Comments: No edema is noted at this time. Skin:     Findings: No rash. Neurological:      Comments: He is intubated and sedated. Psychiatric:      Comments: He is intubated and sedated.        Recent laboratory:  Recent Labs     05/31/20  1155 06/03/20  0317   WBC 18.2* 27.6*   RBC 5.44 5.41   HGB 15.5 15.6   HCT 48.0 45.4    167   MCV 88.2 83.9   MCH 28.5 28.8   MCHC 32.3* 34.4   RDW 14.7* 15.3*      Recent Labs     06/01/20  1555 06/02/20  0305 06/03/20  0317    138 141   K 3.8 4.1 4.4   CL 97* 97* 101   CO2 24 23 19*   BUN 29* 38* 66*   CREATININE 2.8* 3.4* 4.7*   CALCIUM 9.4 9.3 8.5*   GLUCOSE 111* 119* 129*      Recent Labs     05/31/20  1200 05/31/20  1254   PHART 7.080* 7.220*   UZQ7OBY 66.0* 55.0*   PO2ART 76.0* 338.0*   WWC3ICP 19.6* 22.5   E5XPVSYI 90.6 96.6   BEART -11.6* -6.1*     Recent Labs     05/31/20  1155 05/31/20  1731 06/01/20  0324  06/03/20  0317   AST 27  --   --   --  17   ALT 23  --   --   --  11   ALKPHOS 125  --   --   --  69   BILITOT 1.0  --   --   --  1.8*   MG  --   --  2.0   < > 1.9   CALCIUM 9.3  --  9.4   < > 8.5*   TROPONINI <0.01 0.03 0.03  --   --    LACTA  --  1.6  --   --   --    INR 1.06  --   --   --   --     < > = values in this interval not displayed. Recent Labs     05/31/20  1135   BC No Growth to date. Any change in status will be called. Radiograph  Xr Chest Portable    Result Date: 6/2/2020  XR CHEST PORTABLE 6/2/2020 11:55 AM History: Respiratory failure. Portable chest x-ray compared with 6/1/2020. Normal heart size. Median sternotomy changes. The endotracheal tube remains in good position with the tip approximately 6.5 cm above the lupe. There is some patchy interstitial infiltrate within the left lower lobe though overall the lungs are clearer now as compared with yesterday. There is no pneumothorax. No new or increased lung disease. 1. The endotracheal tube remains in good position. 2. Interstitial prominence at the left lower lobe with overall clearing of the lungs compared with yesterday. 3. No new abnormality. Signed by Dr Poppy Griggs on 6/2/2020 11:56 AM    Xr Chest Portable    Result Date: 6/1/2020  Examination. XR CHEST PORTABLE 6/1/2020 7:00 AM History: Respiratory failure.  A single frontal portable x-ray shows an improving pulmonary edema pattern with endotracheal tube in good position. Other test results (not lab or imaging): Cardiogram results are pending. Independent review of ekg: EKG revealed sinus tachycardia with left ventricular hypertrophy and some ST-T wave changes inferior laterally. Also appear to be left atrial enlargement. Problem list as generated by [employer]:  Patient Active Problem List   Diagnosis    CAD (coronary artery disease)    Cigarette smoker    Hypercholesteremia    CAD (coronary artery disease)    Cigarette smoker    Hypertension    Hypercholesteremia    Atypical chest pain    ACS (acute coronary syndrome) ([facility] [address]    CHF (congestive heart failure), NYHA class IV, acute on chronic, systolic (HCC)     Pulmonary Assessment:  SEVERE ACUTE RESPIRATORY FAILURE REQUIRING MECHANICAL VENTILATION  This is a threat to life or pulmonary function, high risk, due to acute congestive heart failure and acute kidney injury. New problem (to me), with additional workup planned:  1. Acute hypoxemic respiratory failure due to congestive heart failure. 2.  Acute kidney injury. 3.  Supraventricular tachyarrhythmias. New problem (to me), no additional workup planned:  1. Hypertension. Other problems either stable, failing to improve or worsenin. Chronic tobacco use. Recommend/plan:   Arterial blood gas in the morning tomorrow  Chest X-ray in the morning tomorrow  · I would make no further attempts to wean him pending further evaluation and treatment of his tachyarrhythmias and his acute kidney injury. Thank you for this consult. We will follow along.   Electronically signed by Gary Ruiz on 6/3/2020 at 10:23 AM

## 2020-06-03 NOTE — PROGRESS NOTES
Large stream around whitfield placed 5 ml of sterile water in bulb. complete linen change. Large amt of clear oral secretions.

## 2020-06-03 NOTE — CONSULTS
normal LVFX  3/19/2013  lexiscan Positive for anterior and inferior myocardial ischemia, EF 52%, 6% ischemic myocardium on stress, intermediate risk findings, AUC indication 16, AUC score 7  8/19/2013  Cath  Occluded LIMA-LAD, 2.0x8 BMS to VG-D1, normal LVFX   9/22/2018  ACS SARAH RISK Score 4 (angina, cad> 50, hypertension, hypercholesteremia, cigarette use, asa), AUC indication 3, AUC score 8   9/24/18  Cath  Patent LIMA-LAD, occluded VG-diag, anterior lateral hypokinesis, EF 45%        Past Medical History:    Past Medical History:   Diagnosis Date    CAD (coronary artery disease) 8/19/2013    10/22/2004  Stent to LAD 12/17/2004  Cath  Patent stent in the LAD, normal LVFX   6/9/2005  Cath  Patent stent in the LAD, normal LVFX 9/19/2005  cardiolite negative for myocardial ischemia 7/24/2006  Non Q wave MI 7/24/2006  PTCA to diagonal 11/7/2007  CABG x 2 LIMA-LAD, VG-diagonal 11/2/2011  Cath  Patent LIMA-LAD, patent VG-diag, normal LVFX 3/18/2013  lexiscan  Positive for anterior and inferi    Cigarette smoker 8/19/2013    Hypercholesteremia 8/19/2013    Hypertension 8/19/2013         Past Surgical History:    Past Surgical History:   Procedure Laterality Date    CARDIAC CATHETERIZATION  8/19/13  JDT    with stent. EF 60%    CORONARY ARTERY BYPASS GRAFT  11/7/2007    Dr. Chema Hernandez Medications:   Prior to Admission medications    Medication Sig Start Date End Date Taking?  Authorizing Provider   pantoprazole (PROTONIX) 40 MG tablet Take 40 mg by mouth daily   Yes Historical Provider, MD   furosemide (LASIX) 20 MG tablet Take 20 mg by mouth 2 times daily   Yes Historical Provider, MD   potassium chloride (KLOR-CON) 20 MEQ packet Take 20 mEq by mouth 2 times daily   Yes Historical Provider, MD   carvedilol (COREG) 6.25 MG tablet Take 6.25 mg by mouth 2 times daily (with meals)   Yes Historical Provider, MD   lisinopril (PRINIVIL) 20 MG tablet Take 1 tablet by mouth daily  Patient Transportation needs     Medical: Not on file     Non-medical: Not on file   Tobacco Use    Smoking status: Current Every Day Smoker     Packs/day: 0.50     Years: 14.00     Pack years: 7.00    Smokeless tobacco: Never Used   Substance and Sexual Activity    Alcohol use: Yes     Comment: occas---once/year    Drug use: No    Sexual activity: Not on file   Lifestyle    Physical activity     Days per week: Not on file     Minutes per session: Not on file    Stress: Not on file   Relationships    Social connections     Talks on phone: Not on file     Gets together: Not on file     Attends Yazdanism service: Not on file     Active member of club or organization: Not on file     Attends meetings of clubs or organizations: Not on file     Relationship status: Not on file    Intimate partner violence     Fear of current or ex partner: Not on file     Emotionally abused: Not on file     Physically abused: Not on file     Forced sexual activity: Not on file   Other Topics Concern    Not on file   Social History Narrative    ** Merged History Encounter **            Family History:     Family History   Problem Relation Age of Onset    High Blood Pressure Mother     High Blood Pressure Father     Cancer Sister          REVIEW OF SYSTEMS:     Except as noted in the HPI, all other systems are negative        PHYSICAL EXAMINATION:     /76   Pulse 126   Temp 98.9 °F (37.2 °C) (Temporal)   Resp 24   Ht 5' 9\" (1.753 m)   Wt 151 lb 6.4 oz (68.7 kg)   SpO2 93%   BMI 22.36 kg/m²     GENERAL - well developed and well nourished, in severe amount of generalized distress; is not an active participant in this examination  HEENT -  PERRLA, Hearing not tested, conjunctiva and lids are normal, ears and nose appear normal  NECK - no thyromegaly, no JVD, trachea is in the midline  CARDIOVASCULAR - PMI is in the left mid line clavicular position, Normal S1 and S2 with a grade 1/6 systolic murmur.   No S3 or S4    PULMONARY LEUKOCYTESUR TRACE 06/01/2020    UROBILINOGEN 0.2 06/01/2020    BILIRUBINUR Negative 06/01/2020    BLOODU LARGE 06/01/2020    GLUCOSEU Negative 06/01/2020             ALL THE CARDIOLOGY PROBLEMS ARE LISTED ABOVE; HOWEVER, THE FOLLOWING SPECIFIC CARDIAC PROBLEMS WERE ADDRESSED AND  TREATED DURING THE HOSPITAL     MEDICAL DECISION MAKING               Cardiac Specific Problem / Diagnosis  Discussion and Data Reviewed Diagnostic or Therapeutic Procedures Ordered Management Options Selected                   1. Presenting problem / symptom    Progressive dyspnea  are worsening   Now intubated Yes: echo Continue current medications:    N/A                2. CAD Initial presentation during this evaluation   Review and summation of old records:    10/22/2004  Cath  Stent to LAD, normal LVFX  12/17/2004  Cath  Patent stent in the LAD, normal LVFX  6/9/2005  Cath  Patent stent in the LAD, normal LVFX  9/19/2005  cardiolite negative for myocardial ischemia, EF 40%  7/24/2006  Non Q wave MI  7/24/2006  Cath  ptca to the diag  11/6/2007  Cath  instent restenosis of the LAD stent, normal LVFX  11/7/2007  CABG x 2 LIMA-LAD, VG-diag Shayna Jenkins)  11/2/2011  Cath  Patent LIMA-LAD, patent VG-diag, normal LVFX  3/19/2013  lexiscan Positive for anterior and inferior myocardial ischemia, EF 52%, 6% ischemic myocardium on stress, intermediate risk findings, AUC indication 16, AUC score 7  8/19/2013  Cath  Occluded LIMA-LAD, 2.0x8 BMS to VG-D1, normal LVFX   9/22/2018  ACS SARAH RISK Score 4 (angina, cad> 50, hypertension, hypercholesteremia, cigarette use, asa), AUC indication 3, AUC score 8   9/24/18  Cath  Patent LIMA-LAD, occluded VG-diag, anterior lateral hypokinesis, EF 45%     Yes: echo Continue current medications:    Yes: as per renal           3.  Concern regarding systemic blood pressure Initial presentation during this evaluation Systolic (98GFU), NZK:337 , Min:92 , JTQ:526    Diastolic (41JTY), NYF:50, Min:63, Max:107   No [place]

## 2020-06-03 NOTE — PROGRESS NOTES
Hospitalist Progress Note    Patient:  Dominguez Hernandez  YOB: 1955  Date of Service: 6/3/2020  MRN: [id number]   Acct: [de-identified]   Primary Care Physician: JANNET Morgan  Advance Directive: Prior  Admit Date: 5/31/2020       Hospital Day: 3  Referring Provider: Guicho Law DO    Patient Seen, Chart, Consults, Notes, Labs, Radiology studies reviewed. Subjective: Dominguez Hernandez is a 71 y.o. male  whom we are following for congestive heart failure, hypoxemic respiratory failure, acute kidney injury. Unfortunately his creatinine continues to rise. His urine volume is trailing off. He remains tachycardic. I am concerned that he may be in atrial flutter. He is also having a difficult time weaning from the ventilator. I will ask pulmonology to evaluate the patient.     Allergies:  Sulfa antibiotics and Iv contrast [iodides]    Medicines:  Current Facility-Administered Medications   Medication Dose Route Frequency Provider Last Rate Last Dose    dilTIAZem 125 mg in dextrose 5 % 125 mL infusion  5 mg/hr Intravenous Continuous Lindo Welaka, DO        0.9 % sodium chloride infusion   Intravenous Continuous Lindo Thanh,  mL/hr at 06/03/20 0520      famotidine (PEPCID) injection 20 mg  20 mg Intravenous Daily Lindo Thanh, DO   20 mg at 06/02/20 2116    metoprolol (LOPRESSOR) injection 5 mg  5 mg Intravenous Q6H Lindo Thanh, DO   5 mg at 06/03/20 4499    aspirin chewable tablet 81 mg  81 mg Oral Daily Lindo Welaka, DO   Stopped at 06/01/20 1147    perflutren lipid microspheres (DEFINITY) injection 1.65 mg  1.5 mL Intravenous ONCE PRN Lindo Thanh, DO        propofol injection  10 mcg/kg/min Intravenous Titrated Lindo Thanh, DO 13 mL/hr at 06/03/20 0520 30 mcg/kg/min at 06/03/20 0520    chlorhexidine (PERIDEX) 0.12 % solution 15 mL  15 mL Mouth/Throat BID Lindo Thanh, DO   15 mL at 06/02/20 2122       Past Medical History:  Past Medical History:   Diagnosis Date    CAD (coronary artery disease) 8/19/2013    10/22/2004  Stent to LAD 12/17/2004  Cath  Patent stent in the LAD, normal LVFX   6/9/2005  Cath  Patent stent in the LAD, normal LVFX 9/19/2005  cardiolite negative for myocardial ischemia 7/24/2006  Non Q wave MI 7/24/2006  PTCA to diagonal 11/7/2007  CABG x 2 LIMA-LAD, VG-diagonal 11/2/2011  Cath  Patent LIMA-LAD, patent VG-diag, normal LVFX 3/18/2013  lexiscan  Positive for anterior and inferi    Cigarette smoker 8/19/2013    Hypercholesteremia 8/19/2013    Hypertension 8/19/2013       Past Surgical History:  Past Surgical History:   Procedure Laterality Date    CARDIAC CATHETERIZATION  8/19/13  JDT    with stent.  EF 60%    CORONARY ARTERY BYPASS GRAFT  11/7/2007    Dr. Chaney Economy History  Family History   Problem Relation Age of Onset    High Blood Pressure Mother     High Blood Pressure Father     Cancer Sister        Social History  Social History     Socioeconomic History    Marital status:      Spouse name: Not on file    Number of children: Not on file    Years of education: Not on file    Highest education level: Not on file   Occupational History    Not on file   Social Needs    Financial resource strain: Not on file    Food insecurity     Worry: Not on file     Inability: Not on file    Transportation needs     Medical: Not on file     Non-medical: Not on file   Tobacco Use    Smoking status: Current Every Day Smoker     Packs/day: 0.50     Years: 14.00     Pack years: 7.00    Smokeless tobacco: Never Used   Substance and Sexual Activity    Alcohol use: Yes     Comment: occas---once/year    Drug use: No    Sexual activity: Not on file   Lifestyle    Physical activity     Days per week: Not on file     Minutes per session: Not on file    Stress: Not on file   Relationships    Social connections     Talks on phone: Not on file     Gets together: Not on file     Attends Latter-day service: Not on file     Active member of club or organization: Not on file     Attends meetings of clubs or organizations: Not on file     Relationship status: Not on file    Intimate partner violence     Fear of current or ex partner: Not on file     Emotionally abused: Not on file     Physically abused: Not on file     Forced sexual activity: Not on file   Other Topics Concern    Not on file   Social History Narrative    ** Merged History Encounter **              Review of Systems:    Review of Systems   Unable to perform ROS: Intubated           Objective:  Blood pressure 136/74, pulse 116, temperature 98.5 °F (36.9 °C), temperature source Temporal, resp. rate 21, height 5' 9\" (1.753 m), weight 155 lb 1.6 oz (70.4 kg), SpO2 94 %. Intake/Output Summary (Last 24 hours) at 6/3/2020 1047  Last data filed at 6/3/2020 0600  Gross per 24 hour   Intake 1988.22 ml   Output 260 ml   Net 1728.22 ml       Physical Exam  Vitals signs reviewed. Constitutional:       Appearance: He is well-developed. HENT:      Head: Normocephalic and atraumatic. Eyes:      Conjunctiva/sclera: Conjunctivae normal.   Cardiovascular:      Rate and Rhythm: Normal rate and regular rhythm. Heart sounds: Normal heart sounds. Pulmonary:      Effort: Pulmonary effort is normal.      Breath sounds: Rales present. Abdominal:      General: Abdomen is flat. Palpations: Abdomen is soft. Skin:     General: Skin is warm and dry.          Labs:  BMP:   Recent Labs     06/01/20  1555 06/02/20  0305 06/03/20  0317    138 141   K 3.8 4.1 4.4   CL 97* 97* 101   CO2 24 23 19*   BUN 29* 38* 66*   CREATININE 2.8* 3.4* 4.7*   CALCIUM 9.4 9.3 8.5*     CBC:   Recent Labs     05/31/20  1155 06/03/20  0317   WBC 18.2* 27.6*   HGB 15.5 15.6   HCT 48.0 45.4   MCV 88.2 83.9    167     LIVER PROFILE:   Recent Labs     05/31/20  1155 06/03/20  0317   AST 27 17   ALT 23 11   BILITOT 1.0 1.8*   ALKPHOS 125 69 PT/INR:   Recent Labs     05/31/20  1155   PROTIME 13.7   INR 1.06     APTT:   Recent Labs     05/31/20  1155   APTT 31.5     BNP:  No results for input(s): BNP in the last 72 hours. Ionized Calcium:No results for input(s): IONCA in the last 72 hours. Magnesium:  Recent Labs     06/01/20  0324 06/02/20  0305 06/03/20  0317   MG 2.0 2.0 1.9     Phosphorus:No results for input(s): PHOS in the last 72 hours. HgbA1C: No results for input(s): LABA1C in the last 72 hours. Hepatic:   Recent Labs     05/31/20  1155 06/03/20  0317   ALKPHOS 125 69   ALT 23 11   AST 27 17   PROT 7.6 5.9*   BILITOT 1.0 1.8*   LABALBU 4.2 3.3*     Lactic Acid:   Recent Labs     05/31/20  1731   LACTA 1.6     Troponin: No results for input(s): CKTOTAL, CKMB, TROPONINT in the last 72 hours. ABGs: No results for input(s): PH, PCO2, PO2, HCO3, O2SAT in the last 72 hours. CRP:  No results for input(s): CRP in the last 72 hours. Sed Rate:  No results for input(s): SEDRATE in the last 72 hours. Cultures:   No results for input(s): CULTURE in the last 72 hours. Recent Labs     05/31/20  1135 05/31/20  1155   BC No Growth to date. Any change in status will be called. --    BLOODCULT2  --  No Growth to date. Any change in status will be called. No results for input(s): CXSURG in the last 72 hours. Radiology reports as per the Radiologist  Radiology: Xr Chest Portable    Result Date: 6/1/2020  Examination. XR CHEST PORTABLE 6/1/2020 7:00 AM History: Respiratory failure. A single frontal portable semiupright view of the chest is compared with the previous study dated 5/31/2020. There is moderate improvement in the right lung infiltrate since the previous study. The left lung infiltrate persist without any significant change. There is a trace pleural effusion at bilateral bases. There is no pulmonary congestion or pneumothorax. The endotracheal tube is in a stable and optimal positioning.  Sternotomy sutures are seen in place without

## 2020-06-03 NOTE — PROGRESS NOTES
Nephrology ([facility]) Progress Note      Patient:  Any Humphreys  YOB: 1955  Date of Service: 6/3/2020  MRN: [id number]   Acct: [de-identified]   Primary Care Physician: JANNET Mansfield  Advance Directive: Prior  Admit Date: 5/31/2020       Hospital Day: 3  Referring Provider: Mago Miller,     Patient independently seen and examined, Chart, Consults, Notes, Operative notes, Labs, Cardiology, and Radiology studies reviewed as able. Chief complaint: Abnormal labs. Subjective: Any Humphreys is a 71 y.o. male  whom we were consulted for acute kidney injury. Patient denies any history of chronic kidney disease. His serum creatinine on admission was 1.2 mg. He presented with hypoxemic respiratory failure and was diagnosed with congestive heart failure/fluid overloaded. Hospital course remarkable for treatment with intravenous Bumex drip and has excellent diuresis. He is currently on a ventilator and attempt for extubation is currently in progress. Chest x-ray has significantly improved. However his serum creatinine has risen to 3.4 mg. Patient remains in ICU/intubated, currently receiving slow hydration and has no improvement of renal function.     Allergies:  Sulfa antibiotics and Iv contrast [iodides]    Medicines:  Current Facility-Administered Medications   Medication Dose Route Frequency Provider Last Rate Last Dose    0.9 % sodium chloride infusion   Intravenous Continuous Nabila Santiago  mL/hr at 06/03/20 0520      famotidine (PEPCID) injection 20 mg  20 mg Intravenous Daily Edger Staff, DO   20 mg at 06/02/20 2116    metoprolol (LOPRESSOR) injection 5 mg  5 mg Intravenous Q6H Edger Staff, DO   5 mg at 06/03/20 0536    nitroGLYCERIN 50 mg in dextrose 5% 250 mL infusion  5 mcg/min Intravenous Continuous Edger Staff, DO   Stopped at 05/31/20 1550    aspirin chewable tablet 81 mg  81 mg Oral Daily Edger Staff, 0.50     Years: 14.00     Pack years: 7.00    Smokeless tobacco: Never Used   Substance and Sexual Activity    Alcohol use: Yes     Comment: occas---once/year    Drug use: No    Sexual activity: Not on file   Lifestyle    Physical activity     Days per week: Not on file     Minutes per session: Not on file    Stress: Not on file   Relationships    Social connections     Talks on phone: Not on file     Gets together: Not on file     Attends Orthodoxy service: Not on file     Active member of club or organization: Not on file     Attends meetings of clubs or organizations: Not on file     Relationship status: Not on file    Intimate partner violence     Fear of current or ex partner: Not on file     Emotionally abused: Not on file     Physically abused: Not on file     Forced sexual activity: Not on file   Other Topics Concern    Not on file   Social History Narrative    ** Merged History Encounter **              Review of Systems:  Review of system is not possible as he is intubated and sedated.   Objective:  Patient Vitals for the past 24 hrs:   BP Temp Temp src Pulse Resp SpO2 Weight   06/03/20 0700 136/74 -- -- 116 21 94 % --   06/03/20 0623 -- -- -- 113 19 (!) 74 % --   06/03/20 0600 (!) 127/100 -- -- 111 18 100 % 155 lb 1.6 oz (70.4 kg)   06/03/20 0500 (!) 137/98 -- -- 123 21 92 % --   06/03/20 0400 124/88 98.5 °F (36.9 °C) Temporal 121 19 99 % --   06/03/20 0300 98/63 -- -- 116 22 100 % --   06/03/20 0222 -- -- -- 124 19 97 % --   06/03/20 0200 90/68 -- -- 123 19 97 % --   06/03/20 0100 113/88 -- -- 120 19 90 % --   06/03/20 0000 84/66 98.7 °F (37.1 °C) Temporal 115 22 94 % --   06/02/20 2300 95/68 -- -- 112 22 94 % --   06/02/20 2200 101/70 -- -- 114 23 95 % --   06/02/20 2152 -- -- -- 117 21 95 % --   06/02/20 2100 107/79 -- -- 131 22 93 % --   06/02/20 2000 103/76 98.9 °F (37.2 °C) Temporal 126 24 93 % --   06/02/20 1900 (!) 143/95 -- -- 126 25 93 % --   06/02/20 1852 -- -- -- 121 23 93 % -- 06/02/20 1800 124/70 -- -- 119 21 92 % --   06/02/20 1700 105/83 -- -- 115 20 93 % --   06/02/20 1600 102/66 98.3 °F (36.8 °C) Temporal 122 24 94 % --   06/02/20 1500 (!) 134/104 -- -- 110 21 96 % --   06/02/20 1409 -- -- -- 106 22 94 % --   06/02/20 1400 95/76 -- -- 107 20 94 % --   06/02/20 1300 123/87 -- -- 119 19 95 % --   06/02/20 1200 (!) 145/105 98.1 °F (36.7 °C) Temporal 119 20 93 % --   06/02/20 1100 (!) 127/92 -- -- 120 21 94 % --   06/02/20 1006 -- -- -- 106 (!) 32 94 % --   06/02/20 1000 109/75 -- -- 113 19 94 % --       Intake/Output Summary (Last 24 hours) at 6/3/2020 0915  Last data filed at 6/3/2020 0600  Gross per 24 hour   Intake 1988.22 ml   Output 360 ml   Net 1628.22 ml     General: Intubated/sedated. HEENT: Normocephalic atraumatic head/orotracheal intubation. Neck: Supple with no JVD or carotid bruits. Chest:  clear to auscultation bilaterally without respiratory distress  CVS: regular rate and rhythm  Abdominal: soft, nontender, normal bowel sounds  Extremities: no cyanosis or edema  Skin: warm and dry without rash      Labs:  BMP:   Recent Labs     06/01/20  1555 06/02/20  0305 06/03/20  0317    138 141   K 3.8 4.1 4.4   CL 97* 97* 101   CO2 24 23 19*   BUN 29* 38* 66*   CREATININE 2.8* 3.4* 4.7*   CALCIUM 9.4 9.3 8.5*     CBC:   Recent Labs     05/31/20  1155 06/03/20  0317   WBC 18.2* 27.6*   HGB 15.5 15.6   HCT 48.0 45.4   MCV 88.2 83.9    167     LIVER PROFILE:   Recent Labs     05/31/20  1155 06/03/20  0317   AST 27 17   ALT 23 11   BILITOT 1.0 1.8*   ALKPHOS 125 69     PT/INR:   Recent Labs     05/31/20  1155   PROTIME 13.7   INR 1.06     APTT:   Recent Labs     05/31/20  1155   APTT 31.5     BNP:  No results for input(s): BNP in the last 72 hours. Ionized Calcium:No results for input(s): IONCA in the last 72 hours.   Magnesium:  Recent Labs     06/01/20  0324 06/02/20  0305 06/03/20  0317   MG 2.0 2.0 1.9     Phosphorus:No results for input(s): PHOS in the last 72 hours. HgbA1C: No results for input(s): LABA1C in the last 72 hours. Hepatic:   Recent Labs     05/31/20  1155 06/03/20  0317   ALKPHOS 125 69   ALT 23 11   AST 27 17   PROT 7.6 5.9*   BILITOT 1.0 1.8*   LABALBU 4.2 3.3*     Lactic Acid:   Recent Labs     05/31/20  1731   LACTA 1.6     Troponin: No results for input(s): CKTOTAL, CKMB, TROPONINT in the last 72 hours. ABGs: No results for input(s): PH, PCO2, PO2, HCO3, O2SAT in the last 72 hours. CRP:  No results for input(s): CRP in the last 72 hours. Sed Rate:  No results for input(s): SEDRATE in the last 72 hours. Cultures:   No results for input(s): CULTURE in the last 72 hours. Recent Labs     05/31/20  1135 05/31/20  1155   BC No Growth to date. Any change in status will be called. --    BLOODCULT2  --  No Growth to date. Any change in status will be called. No results for input(s): CXSURG in the last 72 hours. Radiology reports as per the Radiologist  Radiology: Xr Chest Portable    Result Date: 6/1/2020  Examination. XR CHEST PORTABLE 6/1/2020 7:00 AM History: Respiratory failure. A single frontal portable semiupright view of the chest is compared with the previous study dated 5/31/2020. There is moderate improvement in the right lung infiltrate since the previous study. The left lung infiltrate persist without any significant change. There is a trace pleural effusion at bilateral bases. There is no pulmonary congestion or pneumothorax. The endotracheal tube is in a stable and optimal positioning. Sternotomy sutures are seen in place without complication. No acute bony abnormality. Moderate improvement in right lung infiltrate. Persistent left lung infiltrate. A trace bibasilar pleural effusion. The above finding are recorded on a digital voice clip in PACS. Signed by Dr Meli Patterson on 6/1/2020 8:09 AM    Xr Chest Portable    Result Date: 5/31/2020  EXAMINATION:  XR CHEST PORTABLE  5/31/2020 12:25 PM HISTORY: Shortness of breath.

## 2020-06-04 ENCOUNTER — OUTSIDE FACILITY SERVICE (OUTPATIENT)
Dept: PULMONOLOGY | Facility: CLINIC | Age: 65
End: 2020-06-04

## 2020-06-04 PROCEDURE — 99233 SBSQ HOSP IP/OBS HIGH 50: CPT | Performed by: INTERNAL MEDICINE

## 2020-06-04 NOTE — PROGRESS NOTES
mcg/kg/min at 06/04/20 0830    chlorhexidine (PERIDEX) 0.12 % solution 15 mL  15 mL Mouth/Throat BID Alisha Salazarster, DO   15 mL at 06/04/20 1034       Past Medical History:  Past Medical History:   Diagnosis Date    CAD (coronary artery disease) 8/19/2013    10/22/2004  Stent to LAD 12/17/2004  Cath  Patent stent in the LAD, normal LVFX   6/9/2005  Cath  Patent stent in the LAD, normal LVFX 9/19/2005  cardiolite negative for myocardial ischemia 7/24/2006  Non Q wave MI 7/24/2006  PTCA to diagonal 11/7/2007  CABG x 2 LIMA-LAD, VG-diagonal 11/2/2011  Cath  Patent LIMA-LAD, patent VG-diag, normal LVFX 3/18/2013  lexiscan  Positive for anterior and inferi    Cigarette smoker 8/19/2013    Hypercholesteremia 8/19/2013    Hypertension 8/19/2013       Past Surgical History:  Past Surgical History:   Procedure Laterality Date    CARDIAC CATHETERIZATION  8/19/13  JDT    with stent.  EF 60%    CORONARY ARTERY BYPASS GRAFT  11/7/2007    Dr. Vincente Cockayne History  Family History   Problem Relation Age of Onset    High Blood Pressure Mother     High Blood Pressure Father     Cancer Sister        Social History  Social History     Socioeconomic History    Marital status:      Spouse name: Not on file    Number of children: Not on file    Years of education: Not on file    Highest education level: Not on file   Occupational History    Not on file   Social Needs    Financial resource strain: Not on file    Food insecurity     Worry: Not on file     Inability: Not on file    Transportation needs     Medical: Not on file     Non-medical: Not on file   Tobacco Use    Smoking status: Current Every Day Smoker     Packs/day: 0.50     Years: 14.00     Pack years: 7.00    Smokeless tobacco: Never Used   Substance and Sexual Activity    Alcohol use: Yes     Comment: occas---once/year    Drug use: No    Sexual activity: Not on file   Lifestyle    Physical activity     Days per week: Not on file     Minutes per session: Not on file    Stress: Not on file   Relationships    Social connections     Talks on phone: Not on file     Gets together: Not on file     Attends Holiness service: Not on file     Active member of club or organization: Not on file     Attends meetings of clubs or organizations: Not on file     Relationship status: Not on file    Intimate partner violence     Fear of current or ex partner: Not on file     Emotionally abused: Not on file     Physically abused: Not on file     Forced sexual activity: Not on file   Other Topics Concern    Not on file   Social History Narrative    ** Merged History Encounter **              Review of Systems:    Review of Systems   Unable to perform ROS: Intubated           Objective:  Blood pressure 99/71, pulse 85, temperature 98.5 °F (36.9 °C), temperature source Temporal, resp. rate 22, height 5' 9\" (1.753 m), weight 155 lb 1.6 oz (70.4 kg), SpO2 91 %. Intake/Output Summary (Last 24 hours) at 6/4/2020 1314  Last data filed at 6/4/2020 0700  Gross per 24 hour   Intake 2245.81 ml   Output 550 ml   Net 1695.81 ml       Physical Exam  Vitals signs reviewed. Constitutional:       Appearance: He is well-developed. HENT:      Head: Normocephalic and atraumatic. Eyes:      Conjunctiva/sclera: Conjunctivae normal.      Pupils: Pupils are equal, round, and reactive to light. Cardiovascular:      Rate and Rhythm: Normal rate and regular rhythm. Heart sounds: Normal heart sounds. Pulmonary:      Effort: Pulmonary effort is normal.      Breath sounds: Rhonchi present. Abdominal:      General: Abdomen is flat. Palpations: Abdomen is soft. Skin:     General: Skin is warm and dry. Neurological:      Mental Status: He is alert and oriented to person, place, and time.          Labs:  BMP:   Recent Labs     06/02/20  0305 06/03/20  0317 06/04/20  0435 06/04/20  0557    141  --  143   K 4.1 4.4 4.2 4.5   CL 97* 101  --

## 2020-06-04 NOTE — PROGRESS NOTES
Continuous  · Additives/Modulars: Protein  · Water Flushes: 10ml  · Current TF & Flush Orders Provides: 0  · Goal TF & Flush Orders Provides: 992 kcals with 44 g protein, 88ml free water from tf formula and 240ml free water froom tf pump.   Proteinex will add another 200 kcals and 52g protein  · Additional Calories: Propofol 16.5ml = 435 NP kcals     · Anthropometric Measures:  · Ht: 5' 9\" (175.3 cm)   · Current Body Wt: 155 lb 1 oz (70.3 kg)  · Admission Body Wt: 159 lb (72.1 kg)(stated)  · Usual Body Wt: 160 lb (72.6 kg)(8/2019)  · Weight Change: 5.3% weight decrease from UBW   · [place] Body Wt: 160 lb (72.6 kg), % Ideal Body 96.9%  · BMI Classification: BMI 18.5 - 24.9 Normal Weight    Nutrition Interventions:   Continue NPO, Start Tube Feeding  Continued Inpatient Monitoring    Nutrition Evaluation:   · Evaluation: No progress toward goals   · Goals: meet nutritional needs through po intake or alternate source of nutrition   · Monitoring: TF Intake, TF Tolerance, Skin Integrity, Weight, Pertinent Labs      Electronically signed by Deyanira Olsen, MS, RD, LD on 6/4/20 at 11:39 AM CDT    Contact Number: [phone]

## 2020-06-04 NOTE — CONSULTS
Palliative Care:     Initiated for support. Pt being treated for CHF, respiratory failure and LORETO. He is on the ventilator and no family present. Review of history and report from his nurse Gabino Alves. Pt's wife had been in this morning and is now gone to work. She expressed to Gabino Alves that she wished for pt to be a full code. She signed consent to place a dialysis catheter if needed. We will follow up with family and reach out to support them.      Medical History:        Past Medical History:   Diagnosis Date    CAD (coronary artery disease) 8/19/2013    10/22/2004  Stent to LAD 12/17/2004  Cath  Patent stent in the LAD, normal LVFX   6/9/2005  Cath  Patent stent in the LAD, normal LVFX 9/19/2005  cardiolite negative for myocardial ischemia 7/24/2006  Non Q wave MI 7/24/2006  PTCA to diagonal 11/7/2007  CABG x 2 LIMA-LAD, VG-diagonal 11/2/2011  Cath  Patent LIMA-LAD, patent VG-diag, normal LVFX 3/18/2013  lexiscan  Positive for anterior and inferi    Cigarette smoker 8/19/2013    Hypercholesteremia 8/19/2013    Hypertension 8/19/2013       Advance Directives:    Full code      Wife:  Kimberly White   [phone]     Electronically signed by Ernie Lemons RN on 6/4/2020 at 2:15 PM

## 2020-06-04 NOTE — PROGRESS NOTES
Nephrology ([facility]) Progress Note      Patient:  Alma Haney  YOB: 1955  Date of Service: 6/4/2020  MRN: [id number]   Acct: [de-identified]   Primary Care Physician: JANNET Foster  Advance Directive: Prior  Admit Date: 5/31/2020       Hospital Day: 4  Referring Provider: Alma Antonio DO    Patient independently seen and examined, Chart, Consults, Notes, Operative notes, Labs, Cardiology, and Radiology studies reviewed as able. Chief complaint: Abnormal labs. Subjective: Alma Haney is a 71 y.o. male  whom we were consulted for acute kidney injury. Patient denies any history of chronic kidney disease. His serum creatinine on admission was 1.2 mg. He presented with hypoxemic respiratory failure and was diagnosed with congestive heart failure/fluid overloaded. Hospital course remarkable for treatment with intravenous Bumex drip and has excellent diuresis. He is currently on a ventilator and attempt for extubation is currently in progress. Chest x-ray has significantly improved. However his serum creatinine has risen to 3.4 mg. Patient remains in ICU/intubated, currently receiving slow hydration and has no improvement of renal function. Repeat chest x-ray shows persistent left lower lobe infiltrate consistent with pneumonia. Rest of the lungs are clear. Patient has urine output and has improvement of renal function.     Allergies:  Sulfa antibiotics and Iv contrast [iodides]    Medicines:  Current Facility-Administered Medications   Medication Dose Route Frequency Provider Last Rate Last Dose    dilTIAZem 125 mg in dextrose 5 % 125 mL infusion  5 mg/hr Intravenous Continuous Alma Antonio DO 10 mL/hr at 06/04/20 0232 10 mg/hr at 06/04/20 0232    0.9 % sodium chloride infusion   Intravenous Continuous Alma Antonio  mL/hr at 06/03/20 0520      famotidine (PEPCID) injection 20 mg  20 mg Intravenous Daily Annel Mendes Saurabh, DO   20 mg at 06/03/20 2119    metoprolol (LOPRESSOR) injection 5 mg  5 mg Intravenous Q6H Alma Kongjosiane DO   5 mg at 06/04/20 0092    aspirin chewable tablet 81 mg  81 mg Oral Daily Alma Antonio DO   Stopped at 06/01/20 1147    perflutren lipid microspheres (DEFINITY) injection 1.65 mg  1.5 mL Intravenous ONCE PRN Alma Antonio DO        propofol injection  10 mcg/kg/min Intravenous Titrated Alma Antonio DO 15.1 mL/hr at 06/03/20 2300 35 mcg/kg/min at 06/03/20 2300    chlorhexidine (PERIDEX) 0.12 % solution 15 mL  15 mL Mouth/Throat BID Alma Kongjosiane DO   15 mL at 06/03/20 2119       Past Medical History:  Past Medical History:   Diagnosis Date    CAD (coronary artery disease) 8/19/2013    10/22/2004  Stent to LAD 12/17/2004  Cath  Patent stent in the LAD, normal LVFX   6/9/2005  Cath  Patent stent in the LAD, normal LVFX 9/19/2005  cardiolite negative for myocardial ischemia 7/24/2006  Non Q wave MI 7/24/2006  PTCA to diagonal 11/7/2007  CABG x 2 LIMA-LAD, VG-diagonal 11/2/2011  Cath  Patent LIMA-LAD, patent VG-diag, normal LVFX 3/18/2013  lexiscan  Positive for anterior and inferi    Cigarette smoker 8/19/2013    Hypercholesteremia 8/19/2013    Hypertension 8/19/2013       Past Surgical History:  Past Surgical History:   Procedure Laterality Date    CARDIAC CATHETERIZATION  8/19/13  JDT    with stent.  EF 60%    CORONARY ARTERY BYPASS GRAFT  11/7/2007    Dr. Banegas Gi History  Family History   Problem Relation Age of Onset    High Blood Pressure Mother     High Blood Pressure Father     Cancer Sister        Social History  Social History     Socioeconomic History    Marital status:      Spouse name: Not on file    Number of children: Not on file    Years of education: Not on file    Highest education level: Not on file   Occupational History    Not on file   Social Needs    Financial resource strain: Not on file    Food insecurity     Worry: Not on file     Inability: Not on file    Transportation needs     Medical: Not on file     Non-medical: Not on file   Tobacco Use    Smoking status: Current Every Day Smoker     Packs/day: 0.50     Years: 14.00     Pack years: 7.00    Smokeless tobacco: Never Used   Substance and Sexual Activity    Alcohol use: Yes     Comment: occas---once/year    Drug use: No    Sexual activity: Not on file   Lifestyle    Physical activity     Days per week: Not on file     Minutes per session: Not on file    Stress: Not on file   Relationships    Social connections     Talks on phone: Not on file     Gets together: Not on file     Attends Episcopal service: Not on file     Active member of club or organization: Not on file     Attends meetings of clubs or organizations: Not on file     Relationship status: Not on file    Intimate partner violence     Fear of current or ex partner: Not on file     Emotionally abused: Not on file     Physically abused: Not on file     Forced sexual activity: Not on file   Other Topics Concern    Not on file   Social History Narrative    ** Merged History Encounter **              Review of Systems:  Review of system is not possible as he is intubated and sedated.   Objective:  Patient Vitals for the past 24 hrs:   BP Temp Temp src Pulse Resp SpO2   06/04/20 0700 126/83 -- -- 98 20 95 %   06/04/20 0616 -- -- -- 87 22 95 %   06/04/20 0600 137/87 -- -- 92 23 96 %   06/04/20 0500 96/75 97.9 °F (36.6 °C) Temporal 105 24 97 %   06/04/20 0400 -- -- -- 100 -- 98 %   06/04/20 0300 118/85 -- -- 100 24 97 %   06/04/20 0200 100/73 -- -- 104 24 100 %   06/04/20 0100 116/84 -- -- 110 24 100 %   06/04/20 0000 (!) 153/98 98 °F (36.7 °C) Temporal 124 27 100 %   06/03/20 2300 (!) 150/104 -- -- 118 27 100 %   06/03/20 2200 (!) 126/90 -- -- 110 28 100 %   06/03/20 2100 (!) 143/92 -- -- 130 29 100 %   06/03/20 2000 136/85 98.4 °F (36.9 °C) Temporal 124 27 97 %   06/03/20 BILITOT 1.8* 1.9*   ALKPHOS 69 68     PT/INR:   No results for input(s): PROTIME, INR in the last 72 hours. APTT:   No results for input(s): APTT in the last 72 hours. BNP:  No results for input(s): BNP in the last 72 hours. Ionized Calcium:No results for input(s): IONCA in the last 72 hours. Magnesium:  Recent Labs     06/02/20  0305 06/03/20 0317 06/04/20  0557   MG 2.0 1.9 2.3     Phosphorus:No results for input(s): PHOS in the last 72 hours. HgbA1C: No results for input(s): LABA1C in the last 72 hours. Hepatic:   Recent Labs     06/03/20 0317 06/04/20  0557   ALKPHOS 69 68   ALT 11 13   AST 17 26   PROT 5.9* 6.5*   BILITOT 1.8* 1.9*   LABALBU 3.3* 2.8*     Lactic Acid:   No results for input(s): LACTA in the last 72 hours. Troponin: No results for input(s): CKTOTAL, CKMB, TROPONINT in the last 72 hours. ABGs: No results for input(s): PH, PCO2, PO2, HCO3, O2SAT in the last 72 hours. CRP:  No results for input(s): CRP in the last 72 hours. Sed Rate:  No results for input(s): SEDRATE in the last 72 hours. Cultures:   No results for input(s): CULTURE in the last 72 hours. No results for input(s): BC, Trenia Oven in the last 72 hours. No results for input(s): CXSURG in the last 72 hours. Radiology reports as per the Radiologist  Radiology: Xr Chest Portable    Result Date: 6/1/2020  Examination. XR CHEST PORTABLE 6/1/2020 7:00 AM History: Respiratory failure. A single frontal portable semiupright view of the chest is compared with the previous study dated 5/31/2020. There is moderate improvement in the right lung infiltrate since the previous study. The left lung infiltrate persist without any significant change. There is a trace pleural effusion at bilateral bases. There is no pulmonary congestion or pneumothorax. The endotracheal tube is in a stable and optimal positioning. Sternotomy sutures are seen in place without complication. No acute bony abnormality.     Moderate improvement in right LAD, normal LVFX 9/19/2005  cardiolite negative for myocardial ischemia 7/24/2006  Non Q wave MI 7/24/2006  PTCA to diagonal 11/7/2007  CABG x 2 LIMA-LAD, VG-diagonal 11/2/2011  Cath  Patent LIMA-LAD, patent VG-diag, normal LVFX 3/18/2013  lexiscan  Positive for anterior and inferi    Cigarette smoker 8/19/2013    Hypercholesteremia 8/19/2013    Hypertension 8/19/2013       Past Surgical History:  Past Surgical History:   Procedure Laterality Date    CARDIAC CATHETERIZATION  8/19/13  JDT    with stent.  EF 60%    CORONARY ARTERY BYPASS GRAFT  11/7/2007    Dr. Lianna Katz History  Family History   Problem Relation Age of Onset    High Blood Pressure Mother     High Blood Pressure Father     Cancer Sister        Social History  Social History     Socioeconomic History    Marital status:      Spouse name: Not on file    Number of children: Not on file    Years of education: Not on file    Highest education level: Not on file   Occupational History    Not on file   Social Needs    Financial resource strain: Not on file    Food insecurity     Worry: Not on file     Inability: Not on file    Transportation needs     Medical: Not on file     Non-medical: Not on file   Tobacco Use    Smoking status: Current Every Day Smoker     Packs/day: 0.50     Years: 14.00     Pack years: 7.00    Smokeless tobacco: Never Used   Substance and Sexual Activity    Alcohol use: Yes     Comment: occas---once/year    Drug use: No    Sexual activity: Not on file   Lifestyle    Physical activity     Days per week: Not on file     Minutes per session: Not on file    Stress: Not on file   Relationships    Social connections     Talks on phone: Not on file     Gets together: Not on file     Attends Alevism service: Not on file     Active member of club or organization: Not on file     Attends meetings of clubs or organizations: Not on file     Relationship status: Not on file   [facility] Intimate partner violence     Fear of current or ex partner: Not on file     Emotionally abused: Not on file     Physically abused: Not on file     Forced sexual activity: Not on file   Other Topics Concern    Not on file   Social History Narrative    ** Merged History Encounter **              Review of Systems:  Review of system is not possible as he is intubated and sedated.   Objective:  Patient Vitals for the past 24 hrs:   BP Temp Temp src Pulse Resp SpO2   06/04/20 0700 126/83 -- -- 98 20 95 %   06/04/20 0616 -- -- -- 87 22 95 %   06/04/20 0600 137/87 -- -- 92 23 96 %   06/04/20 0500 96/75 97.9 °F (36.6 °C) Temporal 105 24 97 %   06/04/20 0400 -- -- -- 100 -- 98 %   06/04/20 0300 118/85 -- -- 100 24 97 %   06/04/20 0200 100/73 -- -- 104 24 100 %   06/04/20 0100 116/84 -- -- 110 24 100 %   06/04/20 0000 (!) 153/98 98 °F (36.7 °C) Temporal 124 27 100 %   06/03/20 2300 (!) 150/104 -- -- 118 27 100 %   06/03/20 2200 (!) 126/90 -- -- 110 28 100 %   06/03/20 2100 (!) 143/92 -- -- 130 29 100 %   06/03/20 2000 136/85 98.4 °F (36.9 °C) Temporal 124 27 97 %   06/03/20 1900 129/84 -- -- 122 28 96 %   06/03/20 1830 117/74 -- -- 124 27 96 %   06/03/20 1800 (!) 148/88 -- -- 121 (!) 36 100 %   06/03/20 1730 126/88 -- -- 121 29 96 %   06/03/20 1700 139/78 -- -- 124 27 95 %   06/03/20 1630 118/84 -- -- 124 27 94 %   06/03/20 1600 124/89 -- -- 129 30 93 %   06/03/20 1500 (!) 137/95 -- -- 123 27 100 %   06/03/20 1430 118/80 -- -- 119 20 100 %   06/03/20 1414 -- -- -- 123 24 90 %   06/03/20 1400 (!) 130/91 -- -- 125 19 92 %   06/03/20 1330 111/75 -- -- 117 25 94 %   06/03/20 1300 125/81 -- -- 115 26 100 %   06/03/20 1230 112/86 -- -- 116 16 100 %   06/03/20 1200 (!) 118/96 -- -- 129 21 100 %   06/03/20 1130 (!) 140/70 -- -- 138 (!) 37 100 %   06/03/20 1123 -- -- -- 134 20 (!) 88 %   06/03/20 1100 (!) 142/83 -- -- 128 30 (!) 86 %   06/03/20 1030 134/83 -- -- 130 26 98 %   06/03/20 1000 110/81 -- -- 130 26 --   06/03/20 0930 PH, PCO2, PO2, HCO3, O2SAT in the last 72 hours. CRP:  No results for input(s): CRP in the last 72 hours. Sed Rate:  No results for input(s): SEDRATE in the last 72 hours. Cultures:   No results for input(s): CULTURE in the last 72 hours. No results for input(s): BC, Emily Victorde in the last 72 hours. No results for input(s): CXSURG in the last 72 hours. Radiology reports as per the Radiologist  Radiology: Xr Chest Portable    Result Date: 6/1/2020  Examination. XR CHEST PORTABLE 6/1/2020 7:00 AM History: Respiratory failure. A single frontal portable semiupright view of the chest is compared with the previous study dated 5/31/2020. There is moderate improvement in the right lung infiltrate since the previous study. The left lung infiltrate persist without any significant change. There is a trace pleural effusion at bilateral bases. There is no pulmonary congestion or pneumothorax. The endotracheal tube is in a stable and optimal positioning. Sternotomy sutures are seen in place without complication. No acute bony abnormality. Moderate improvement in right lung infiltrate. Persistent left lung infiltrate. A trace bibasilar pleural effusion. The above finding are recorded on a digital voice clip in PACS. Signed by Dr Carolyn Rich on 6/1/2020 8:09 AM    Xr Chest Portable    Result Date: 5/31/2020  EXAMINATION:  XR CHEST PORTABLE  5/31/2020 12:25 PM HISTORY: Shortness of breath. COMPARISON: 5/30/2020. FINDINGS:  There is cardiomegaly. The central vessels are indistinct. There is increasing interstitial infiltrate bilaterally. The infiltrate is greater on the right. The patient is now intubated with endotracheal tube tip at the T3-4 level. 1. Worsening infiltrate bilaterally, likely pulmonary edema. Infection less likely. 2. Cardiomegaly. The central vessels are indistinct. There may be a small pleural effusion on the right. Signed by Dr Bautista Gonzalez on 5/31/2020 12:27 PM       Assessment   1.   Acute

## 2020-06-04 NOTE — PROGRESS NOTES
interstitial left lower lobe infiltrate. Lungs otherwise   remain clear. 2. NG tube successfully advanced into the stomach. Signed by Dr Alexia Santos on 6/4/2020 7:56 AM       My radiograph interpretation: Chest x-ray shows the endotracheal tube in good position. He still to my review has pulmonary vascular congestion with some interstitial prominence particular in the left lower lobe. Pulmonary Assessment:  1. Acute respiratory failure with hypoxemia. 2. Congestive heart failure. 3. Acute kidney injury, slightly improved. 4. Atrial tachyarrhythmias with what appears to be A. fib flutter. 5. Rule out early left lower lobe pneumonia. Recommend:   · He still not a candidate for any weaning trials. We will obtain a sputum for Gram stain and C&S. I did discuss this with his nurse and a family member at the bedside this morning.     Electronically signed by Svetlana Raya on 6/4/2020 at 9:05 AM

## 2020-06-04 NOTE — PLAN OF CARE
Problem: Restraint Use - Nonviolent/Non-Self-Destructive Behavior:  Goal: Absence of restraint indications  Description: Absence of restraint indications  Outcome: Ongoing  Goal: Absence of restraint-related injury  Description: Absence of restraint-related injury  Outcome: Ongoing     Problem: Falls - Risk of:  Goal: Will remain free from falls  Description: Will remain free from falls  Outcome: Ongoing  Goal: Absence of physical injury  Description: Absence of physical injury  Outcome: Ongoing     Problem: Urinary Elimination:  Goal: Signs and symptoms of infection will decrease  Description: Signs and symptoms of infection will decrease  Outcome: Ongoing  Goal: Complications related to the disease process, condition or treatment will be avoided or minimized  Description: Complications related to the disease process, condition or treatment will be avoided or minimized  Outcome: Ongoing     Problem: Airway Clearance - Ineffective:  Goal: Ability to maintain a clear airway will improve  Description: Ability to maintain a clear airway will improve  Outcome: Ongoing     Problem: Nutrition  Goal: Optimal nutrition therapy  Description: Nutrition Problem: Inadequate oral intake  Intervention: Food and/or Nutrient Delivery: Continue NPO  Nutritional Goals: meet nutritional needs through po intake or alternate source of nutrition   Outcome: Ongoing     Problem: Infection - Ventilator-Associated Pneumonia:  Goal: Prevent a ventilator associated event (VAE)  Description: Prevent a ventilator associated event (VAE)  Outcome: Ongoing  Goal: Absence of pulmonary infection  Description: Absence of pulmonary infection  Outcome: Ongoing     Electronically signed by Sedrick Arreaga RN on 6/4/2020 at 2:34 AM

## 2020-06-04 NOTE — PROGRESS NOTES
Consents have been signed for dialysis catheter placement and for hemodialysis. Also, consent has been signed for HIV testing per dialysis protocol.

## 2020-06-05 ENCOUNTER — OUTSIDE FACILITY SERVICE (OUTPATIENT)
Dept: PULMONOLOGY | Facility: CLINIC | Age: 65
End: 2020-06-05

## 2020-06-05 PROCEDURE — 99233 SBSQ HOSP IP/OBS HIGH 50: CPT | Performed by: INTERNAL MEDICINE

## 2020-06-05 NOTE — PROGRESS NOTES
Maye Gave, DO   Stopped at 06/05/20 7074    0.9 % sodium chloride infusion   Intravenous Continuous Oliver Stokes  mL/hr at 06/05/20 0514      famotidine (PEPCID) injection 20 mg  20 mg Intravenous Daily Maye Gave, DO   20 mg at 06/04/20 2144    metoprolol (LOPRESSOR) injection 5 mg  5 mg Intravenous Q6H Maye Gave, DO   5 mg at 06/05/20 7584    aspirin chewable tablet 81 mg  81 mg Oral Daily Maye Gave, DO   81 mg at 06/05/20 0319    perflutren lipid microspheres (DEFINITY) injection 1.65 mg  1.5 mL Intravenous ONCE PRN Maye Gave, DO        propofol injection  10 mcg/kg/min Intravenous Titrated Maye Gave, DO 8.7 mL/hr at 06/05/20 0601 20 mcg/kg/min at 06/05/20 0601    chlorhexidine (PERIDEX) 0.12 % solution 15 mL  15 mL Mouth/Throat BID Maye Gave, DO   15 mL at 06/05/20 1755       Past Medical History:  Past Medical History:   Diagnosis Date    CAD (coronary artery disease) 8/19/2013    10/22/2004  Stent to LAD 12/17/2004  Cath  Patent stent in the LAD, normal LVFX   6/9/2005  Cath  Patent stent in the LAD, normal LVFX 9/19/2005  cardiolite negative for myocardial ischemia 7/24/2006  Non Q wave MI 7/24/2006  PTCA to diagonal 11/7/2007  CABG x 2 LIMA-LAD, VG-diagonal 11/2/2011  Cath  Patent LIMA-LAD, patent VG-diag, normal LVFX 3/18/2013  lexiscan  Positive for anterior and inferi    Cigarette smoker 8/19/2013    Hypercholesteremia 8/19/2013    Hypertension 8/19/2013       Past Surgical History:  Past Surgical History:   Procedure Laterality Date    CARDIAC CATHETERIZATION  8/19/13  JDT    with stent.  EF 60%    CORONARY ARTERY BYPASS GRAFT  11/7/2007    Dr. Lissett Steward History  Family History   Problem Relation Age of Onset    High Blood Pressure Mother     High Blood Pressure Father     Cancer Sister        Social History  Social History     Socioeconomic History    Marital status:  06/05/20 0000 129/84 98.1 °F (36.7 °C) Temporal 100 27 98 % --   06/04/20 2300 136/85 -- -- 101 27 97 % --   06/04/20 2200 -- -- -- -- -- 93 % --   06/04/20 2100 (!) 142/86 -- -- 97 30 94 % --   06/04/20 2000 137/89 98.2 °F (36.8 °C) Temporal 91 29 94 % --   06/04/20 1900 125/77 -- -- 88 26 92 % --   06/04/20 1810 -- -- -- 118 29 (!) 89 % --   06/04/20 1800 (!) 145/85 -- -- 110 24 (!) 89 % --   06/04/20 1700 130/75 99.6 °F (37.6 °C) -- 106 21 95 % --   06/04/20 1600 (!) 144/90 100.5 °F (38.1 °C) Temporal 102 26 90 % --   06/04/20 1500 122/79 -- -- 99 26 (!) 89 % --   06/04/20 1400 131/82 -- -- 95 25 91 % --   06/04/20 1300 114/71 -- -- 89 23 91 % --   06/04/20 1227 -- 98.5 °F (36.9 °C) Temporal 85 22 91 % --   06/04/20 1200 99/71 -- -- 84 23 91 % --   06/04/20 1100 101/72 -- -- 90 23 92 % --       Intake/Output Summary (Last 24 hours) at 6/5/2020 1039  Last data filed at 6/5/2020 0816  Gross per 24 hour   Intake 3629.71 ml   Output 1920 ml   Net 1709.71 ml     General: Intubated/sedated. HEENT: Normocephalic atraumatic head/orotracheal intubation. Neck: Supple with no JVD or carotid bruits.   Chest:  clear to auscultation bilaterally without respiratory distress  CVS: regular rate and rhythm  Abdominal: soft, nontender, normal bowel sounds  Extremities: no cyanosis or edema  Skin: warm and dry without rash      Labs:  BMP:   Recent Labs     06/03/20 0317 06/04/20  0435 06/04/20  0557 06/05/20  0238     --  143 144   K 4.4 4.2 4.5 3.6     --  103 111   CO2 19*  --  22 18*   BUN 66*  --  80* 72*   CREATININE 4.7*  --  4.3* 2.8*   CALCIUM 8.5*  --  8.8 7.1*     CBC:   Recent Labs     06/03/20 0317 06/04/20  0306 06/05/20  0238   WBC 27.6* 20.7* 13.9*   HGB 15.6 14.5 10.1*   HCT 45.4 43.2 29.5*   MCV 83.9 85.2 85.3    124* 121*     LIVER PROFILE:   Recent Labs     06/03/20 0317 06/04/20  0557 06/05/20  0238   AST 17 26 53*   ALT 11 13 19   BILITOT 1.8* 1.9* 1.1   ALKPHOS 69 68 63     PT/INR: 8/19/2013    10/22/2004  Stent to LAD 12/17/2004  Cath  Patent stent in the LAD, normal LVFX   6/9/2005  Cath  Patent stent in the LAD, normal LVFX 9/19/2005  cardiolite negative for myocardial ischemia 7/24/2006  Non Q wave MI 7/24/2006  PTCA to diagonal 11/7/2007  CABG x 2 LIMA-LAD, VG-diagonal 11/2/2011  Cath  Patent LIMA-LAD, patent VG-diag, normal LVFX 3/18/2013  lexiscan  Positive for anterior and inferi    Cigarette smoker 8/19/2013    Hypercholesteremia 8/19/2013    Hypertension 8/19/2013       Past Surgical History:  Past Surgical History:   Procedure Laterality Date    CARDIAC CATHETERIZATION  8/19/13  JDT    with stent.  EF 60%    CORONARY ARTERY BYPASS GRAFT  11/7/2007    Dr. Benton German History  Family History   Problem Relation Age of Onset    High Blood Pressure Mother     High Blood Pressure Father     Cancer Sister        Social History  Social History     Socioeconomic History    Marital status:      Spouse name: Not on file    Number of children: Not on file    Years of education: Not on file    Highest education level: Not on file   Occupational History    Not on file   Social Needs    Financial resource strain: Not on file    Food insecurity     Worry: Not on file     Inability: Not on file    Transportation needs     Medical: Not on file     Non-medical: Not on file   Tobacco Use    Smoking status: Current Every Day Smoker     Packs/day: 0.50     Years: 14.00     Pack years: 7.00    Smokeless tobacco: Never Used   Substance and Sexual Activity    Alcohol use: Yes     Comment: occas---once/year    Drug use: No    Sexual activity: Not on file   Lifestyle    Physical activity     Days per week: Not on file     Minutes per session: Not on file    Stress: Not on file   Relationships    Social connections     Talks on phone: Not on file     Gets together: Not on file     Attends [employer] witness service: Not on file     Active member of club or No results for input(s): SEDRATE in the last 72 hours. Cultures:   No results for input(s): CULTURE in the last 72 hours. No results for input(s): BC, [place] in the last 72 hours. No results for input(s): CXSURG in the last 72 hours. Radiology reports as per the Radiologist  Radiology: Xr Chest Portable    Result Date: 6/1/2020  Examination. XR CHEST PORTABLE 6/1/2020 7:00 AM History: Respiratory failure. A single frontal portable semiupright view of the chest is compared with the previous study dated 5/31/2020. There is moderate improvement in the right lung infiltrate since the previous study. The left lung infiltrate persist without any significant change. There is a trace pleural effusion at bilateral bases. There is no pulmonary congestion or pneumothorax. The endotracheal tube is in a stable and optimal positioning. Sternotomy sutures are seen in place without complication. No acute bony abnormality. Moderate improvement in right lung infiltrate. Persistent left lung infiltrate. A trace bibasilar pleural effusion. The above finding are recorded on a digital voice clip in PACS. Signed by Dr Khalif Dunn on 6/1/2020 8:09 AM    Xr Chest Portable    Result Date: 5/31/2020  EXAMINATION:  XR CHEST PORTABLE  5/31/2020 12:25 PM HISTORY: Shortness of breath. COMPARISON: 5/30/2020. FINDINGS:  There is cardiomegaly. The central vessels are indistinct. There is increasing interstitial infiltrate bilaterally. The infiltrate is greater on the right. The patient is now intubated with endotracheal tube tip at the T3-4 level. 1. Worsening infiltrate bilaterally, likely pulmonary edema. Infection less likely. 2. Cardiomegaly. The central vessels are indistinct. There may be a small pleural effusion on the right. Signed by Dr Abram Minor on 5/31/2020 12:27 PM       Assessment   1. Acute kidney injury secondary to excessive diuresis. 2.  Congestive heart failure exacerbation improved.   3.  History of

## 2020-06-05 NOTE — PROGRESS NOTES
[address]    Daily HOSPITAL Progress Note                            Date:  6/4/20  Patient: Gautam Jameson  Admission:  5/31/2020 12:06 PM  Admit DX: CHF (congestive heart failure), NYHA class IV, acute on chronic, systolic (HCC) [Y51.59]  Age:  71 y.o., 1955        Date of Admission 5/31/2020 12:06 PM   Hospital Length of Stay  LOS: 5 days        Date / Time Of Initially Being Seen For This Daily Visit:  6/4/20, at approximately noon. At that time, I personally saw the patient and rounded with:  Radha Frost RN Nurse Navigator     Date / Time That This Note Is Written:  6/5/2020  at  8:30 AM        The observations documented in this note, including the assessment   and plan are mine    Portions of this note have been copied forward, from prior notes, yet modified, to reflect today's clinical status of this patient. Reason for initial evaluation or the patient's initial complaint    1. Reason for Hospital Admission: dyspena        2. Reason for Cardiology Consultation: cardiomyopathy         SUBJECTIVE:      Chief Complaint / Reason for the Visit   Follow up of:  dyspnea and now intubated and systemic arterial hypertension    Family present and in room during examination:  No    At the time of the examination, the patient was:  Remains intubed      Specialty Problems        Cardiology Problems    ACS (acute coronary syndrome) ([facility] [address]        CAD (coronary artery disease)        Hypercholesteremia        CAD (coronary artery disease)        Hypercholesteremia        Hypertension        Atypical chest pain        CHF (congestive heart failure), NYHA class IV, acute on chronic, systolic (HCC)              Current Status Today According to the patient:  Today he is feeling \"0\". Subjective:  Mr. Gautam Jameson is generally feeling unchanged. Remains intubated    Mr. Gautma Jameson has the following cardiac complaints / symptoms today:    1. dyspnea, intubated    2.  CHF, acute on 10 mcg/kg/min Intravenous Titrated Carylon Shaggy, DO 8.7 mL/hr at 06/05/20 0601 20 mcg/kg/min at 06/05/20 0601    chlorhexidine (PERIDEX) 0.12 % solution 15 mL  15 mL Mouth/Throat BID Carylon Shaggy, DO   15 mL at 06/05/20 5336     Allergies: Sulfa antibiotics and Iv contrast [iodides]  Past Medical History:   Diagnosis Date    CAD (coronary artery disease) 8/19/2013    10/22/2004  Stent to LAD 12/17/2004  Cath  Patent stent in the LAD, normal LVFX   6/9/2005  Cath  Patent stent in the LAD, normal LVFX 9/19/2005  cardiolite negative for myocardial ischemia 7/24/2006  Non Q wave MI 7/24/2006  PTCA to diagonal 11/7/2007  CABG x 2 LIMA-LAD, VG-diagonal 11/2/2011  Cath  Patent LIMA-LAD, patent VG-diag, normal LVFX 3/18/2013  lexiscan  Positive for anterior and inferi    Cigarette smoker 8/19/2013    Hypercholesteremia 8/19/2013    Hypertension 8/19/2013     Past Surgical History:   Procedure Laterality Date    CARDIAC CATHETERIZATION  8/19/13  JDT    with stent.  EF 60%    CORONARY ARTERY BYPASS GRAFT  11/7/2007    Dr. Alex Lafleur       Family History   Problem Relation Age of Onset    High Blood Pressure Mother     High Blood Pressure Father     Cancer Sister      Social History     Tobacco Use    Smoking status: Current Every Day Smoker     Packs/day: 0.50     Years: 14.00     Pack years: 7.00    Smokeless tobacco: Never Used   Substance Use Topics    Alcohol use: Yes     Comment: occas---once/year          Review of Systems:    General:      Complaint / Symptom Yes / No / Description if Yes       Fatigue Yes:  chronic   Weight gain NA   Insomnia NA       Respiratory:        Complaint / Symptom Yes / No / Description if Yes       Cough No   Horseness NA       Cardiovascular:    Complaint / Symptom Yes / No / Description if Yes       Chest Pain N/A   Shortness of Air / Orthopnea Yes: chronic and intubated   Presyncope / Syncope No   Palpitations No         Objective:    /75

## 2020-06-05 NOTE — PLAN OF CARE
Nutrition Problem: Inadequate oral intake  Intervention: Food and/or Nutrient Delivery: Continue current Tube Feeding  Nutritional Goals: meet nutritional needs through po intake or alternate source of nutrition

## 2020-06-05 NOTE — PROGRESS NOTES
Pulmonary and Critical Care Progress Note [facility]    Patient: Blayne Pinto    1955    MR# [id number]    Acct# [de-identified]   06/05/20   11:35 AM  Referring Provider: Arash Cadena DO    Chief Complaint: Mechanically ventilated    Interval history: The patient remains intubated on mechanical ventilatory support. No family at bedside. He is afebrile. He continues to have secretions and nursing reports he was just suctioned. Noted a small residual amount of mucous in the tubing and nursing will suction some more. Preliminary respiratory culture with moderate Gram positive cocci in pairs and few gram negative rods. He has a pulse of 86, RR 26 and sat of 95%. He remains on Propofol with tube feedings. Creatinine is improved to 2.8, BNP is improved down to 2181. WBC 13.9. Medications:    piperacillin-tazobactam, 3.375 g, Intravenous, Q12H    famotidine (PEPCID) injection, 20 mg, Intravenous, Daily    metoprolol, 5 mg, Intravenous, Q6H    aspirin, 81 mg, Oral, Daily    chlorhexidine, 15 mL, Mouth/Throat, BID   Review of Systems:   Cannot obtain due to mechanical ventilation    Physical Exam:  Blood pressure 130/75, pulse 94, temperature 98.7 °F (37.1 °C), temperature source Temporal, resp. rate 28, height 5' 9\" (1.753 m), weight 158 lb 9.6 oz (71.9 kg), SpO2 94 %. Intake/Output Summary (Last 24 hours) at 6/5/2020 1135  Last data filed at 6/5/2020 0816  Gross per 24 hour   Intake 3629.71 ml   Output 1920 ml   Net 1709.71 ml        Vent Mode: AC/VC/Vt Ordered: 600 mL/Rate Set: 14 bmp/Pressure Support: 5 cmH20/PEEP/CPAP: 7/FiO2 : 55 %/   Peak Inspiratory Pressure: 25 cmH2O  Mean Airway Pressure: 15 cmH20  I:E Ratio: 1:1.20  Rate Measured: 28 br/min  Minute Volume: 12 Liters           Physical Exam Vitals signs and nursing note reviewed. Constitutional:       Comments: He is an elderly -American male who is intubated and is on mechanical ventilatory support.   HENT:      Head: lower lobe. The right lung remains clear.       Impression   1.. Worsening interstitial left lower lobe infiltrate. Lungs otherwise   remain clear. 2. NG tube successfully advanced into the stomach. Signed by Dr Valentino Bream on 6/4/2020 7:56 AM       My radiograph interpretation:  No new imaging 6/5/20    Pulmonary Assessment:  1. Acute respiratory failure with hypoxemia. 2. Congestive heart failure-combined systolic and diastolic with EF of 58%   3. Acute kidney injury, improving   4. Atrial tachyarrhythmias with what appears to be A. fib flutter. 5. Rule out early left lower lobe pneumonia. Recommend:   · Mechanical ventilation- continue current settings   · Consideration for SBT when ready -maybe in the next day or so   · Continue antibiotic   · Pepcid for GI prophylaxis     Electronically signed by Vernon Edwards on 6/5/2020 at 11:35 AM  Physician's substantive portion:  Subjective: The patient remains intubated on mechanical ventilatory support and is sedated on propofol. Her blood gases are improved. Chest x-ray is pending. Objective: A few scattered coarse rhonchi are present. Assessment/recommendation: If he continues to improve clinically we may be able to proceed with a spontaneous breathing trial this weekend. I an going to decrease his FiO2 and PEEP. I have seen and examined patient personally, performing a face-to-face diagnostic evaluation with plan of care reviewed and developed with APRN. I have addended and/or modified the above history of present illness, physical examination, and assessment and plan to reflect my findings and impressions. Essential elements of the care plan were discussed with APRN above. Agree with findings and assessment/plan as documented above.     Electronically signed by Laurie Villalobos MD on 6/5/20 at 3:28 PM CDT

## 2020-06-05 NOTE — PROGRESS NOTES
PEEP decreased from 7 to 5 and FiO2 decreased to 45%.   If 6/6 AM ABG's look good, will proceed with weaning trial.

## 2020-06-05 NOTE — PROGRESS NOTES
[address]    Daily HOSPITAL Progress Note                            Date:  6/5/20  Patient: Terry Acuña  Admission:  5/31/2020 12:06 PM  Admit DX: CHF (congestive heart failure), NYHA class IV, acute on chronic, systolic (HCC) [Y86.57]  Age:  71 y.o., 1955        Date of Admission 5/31/2020 12:06 PM   Hospital Length of Stay  LOS: 5 days        Date / Time Of Initially Being Seen For This Daily Visit:  6/5/20, at approximately 1000. At that time, I personally saw the patient and rounded with:  Slava Burnham RN    Date / Time That This Note Is Written:  6/5/2020  at  5:52 PM        The observations documented in this note, including the assessment   and plan are mine    Portions of this note have been copied forward, from prior notes, yet modified, to reflect today's clinical status of this patient. Reason for initial evaluation or the patient's initial complaint    1. Reason for Hospital Admission: dyspnea        2. Reason for Cardiology Consultation: CHF requiring intubation         SUBJECTIVE:      Chief Complaint / Reason for the Visit   Follow up of:  dyspnea and CHF and systemic arterial hypertension    Family present and in room during examination:  Yes    At the time of the examination, the patient was:  Remains on the ventalator      Specialty Problems        Cardiology Problems    ACS (acute coronary syndrome) ([address]        CAD (coronary artery disease)        Hypercholesteremia        CAD (coronary artery disease)        Hypercholesteremia        Hypertension        Atypical chest pain        CHF (congestive heart failure), NYHA class IV, acute on chronic, systolic (HCC)              Current Status Today According to the patient:  Today he is feeling \"0\". Subjective:  Mr. Terry Acuña is generally feeling unchanged. Looks about the same to me    Mr. Terry Acuña has the following cardiac complaints / symptoms today:    1. Dyspnea, on vent    2.  CHF, as noted    3.  Hypertension    The blood pressure for the lastr 36 hours has been:  Systolic (53XGX), WAY:704 , Min:97 , RVF:463    Diastolic (96CZO), GIN:25, Min:66, Max:90        Letty Chow is a 71 y.o. male with the following history as [facility] in [facility]:    Patient Active Problem List    Diagnosis Date Noted    ACS (acute coronary syndrome) ([facility]      Priority: High    CHF (congestive heart failure), NYHA class IV, acute on chronic, systolic ([facility] [address] 84/80/7041     Priority: Low    Atypical chest pain 09/22/2018     Priority: Low    CAD (coronary artery disease) 08/19/2013     Priority: Low    Cigarette smoker 08/19/2013     Priority: Low    Hypertension 08/19/2013     Priority: Low    Hypercholesteremia 08/19/2013     Priority: Low    CAD (coronary artery disease) 11/02/2011     Priority: Low    Cigarette smoker 11/02/2011     Priority: Low    Hypercholesteremia 11/02/2011     Priority: Low     Current Facility-Administered Medications   Medication Dose Route Frequency Provider Last Rate Last Dose    piperacillin-tazobactam (ZOSYN) 3.375 g in dextrose 5 % 50 mL IVPB extended infusion (mini-bag)  3.375 g Intravenous Q12H Ricky Valerio MD   Stopped at 06/05/20 1530    dilTIAZem 125 mg in dextrose 5 % 125 mL infusion  5 mg/hr Intravenous Continuous Pericoine Fanny, DO   Stopped at 06/05/20 0219    0.9 % sodium chloride infusion   Intravenous Continuous Ricky Valerio  mL/hr at 06/05/20 1534      famotidine (PEPCID) injection 20 mg  20 mg Intravenous Daily Pericoine Yih, DO   20 mg at 06/04/20 2144    metoprolol (LOPRESSOR) injection 5 mg  5 mg Intravenous Q6H Pat Escobedo, DO   5 mg at 06/05/20 1130    aspirin chewable tablet 81 mg  81 mg Oral Daily Pat Escobedo, DO   81 mg at 06/05/20 7039    perflutren lipid microspheres (DEFINITY) injection 1.65 mg  1.5 mL Intravenous ONCE PRN Pat Escobedo, DO        propofol injection  10 mcg/kg/min Intravenous Titrated Ga Chase  Hector Taiwo  Hypertension:  yes  Medications / Illicit Drugs:  No  Valvular heart disease:  No  Miscellaneous causes:  No           Diastolic: No Yes: BNP = 5,514     Age:  > 60 years:  JUE: 89  Coronary artery disease:  Yes: 11/7/2007  CABG x 2 LIMA-LAD, VG-diag Ga Marko)  Diabetes:  no  Gender:   female:  no   Hypertension:  yes  Obesity:  BMI > 30:  no  Valvular heart disease:  No  Miscellaneous causes:  Yes: chronic kidney disease                With this data, I believe the patient had:  Acute on chronic systolic and acute diastolic congestive heart failure        Summary of hospital course thus far:        Date Clinical Event Plan of Treatment           6/2/2020 Admitted for: dyspnea  Cardiology Concern:  CHF  Echo and support   6/3/2020 Seen no real change, note not written Continue support   6/4/2020 Remains intubated, looks about the same, EF 15%, seen on 6/4/2020 with Seth Pac Continue maximal support, cath when extubated    06/05/20  Working on getting him off the vent As per pulmonary                          CONSIDERATIONS, THOUGHTS, AND PLANS:     4. Continue present medications except for changes as noted above  5. Continue to monitor rhythm  6. Further orders per clinical course. Discussed with patient and nursing.      Electronically signed by Danish Rivero MD on 06/05/20             [facility] [place]

## 2020-06-06 ENCOUNTER — OUTSIDE FACILITY SERVICE (OUTPATIENT)
Dept: PULMONOLOGY | Facility: CLINIC | Age: 65
End: 2020-06-06

## 2020-06-06 PROCEDURE — 99233 SBSQ HOSP IP/OBS HIGH 50: CPT | Performed by: INTERNAL MEDICINE

## 2020-06-06 NOTE — PROGRESS NOTES
Hospitalist Progress Note    Patient:  Hayley Torrez  YOB: 1955  Date of Service: 6/6/2020  MRN: [id number]   Acct: [de-identified]   Primary Care Physician: JANNET Grullon  Advance Directive: Full Code  Admit Date: 5/31/2020       Hospital Day: 6  Referring Provider: Ana Laura Atkins DO    Patient Seen, Chart, Consults, Notes, Labs, Radiology studies reviewed. Subjective: Hayley Torrez is a 71 y.o. male  whom we are following for hypoxemic respiratory failure, healthcare associated pneumonia, acute kidney injury, acute on chronic systolic heart failure. He had a paroxysm of coughing, and his ET tube came out. He had a very rapid decompensation in his respiratory status. I attempted a short bout of BiPAP, however, he went into flash pulmonary edema and his blood pressure shot up to 719 systolic. I went ahead and did a rapid sequence intubation and use the glide scope. We were able to get him successfully reintubated and stabilized. Unfortunately, he still has copious amounts of secretions. Also his GFR has plateaued.       Allergies:  Sulfa antibiotics and Iv contrast [iodides]    Medicines:  Current Facility-Administered Medications   Medication Dose Route Frequency Provider Last Rate Last Dose    nitroGLYCERIN 50 mg in dextrose 5% 250 mL infusion  5 mcg/min Intravenous Continuous Ana Laura Atkins DO 1.5 mL/hr at 06/06/20 1011 5 mcg/min at 06/06/20 1011    bumetanide (BUMEX) injection 1 mg  1 mg Intravenous Once Ana Laura Atkins DO        piperacillin-tazobactam (ZOSYN) 3.375 g in dextrose 5 % 50 mL IVPB extended infusion (mini-bag)  3.375 g Intravenous Q12H Juan Mckinnon MD 12.5 mL/hr at 06/06/20 1028 3.375 g at 06/06/20 1028    dilTIAZem 125 mg in dextrose 5 % 125 mL infusion  5 mg/hr Intravenous Continuous Ana Laura Atkins DO   Stopped at 06/05/20 0219    famotidine (PEPCID) injection 20 mg  20 mg Intravenous Daily Ana Laura Atkins DO   20 mg at 06/05/20 2047  metoprolol (LOPRESSOR) injection 5 mg  5 mg Intravenous Q6H [facility], DO   5 mg at 06/06/20 9499    aspirin chewable tablet 81 mg  81 mg Oral Daily [facility], DO   81 mg at 06/05/20 4384    perflutren lipid microspheres (DEFINITY) injection 1.65 mg  1.5 mL Intravenous ONCE PRN [facility], DO        propofol injection  10 mcg/kg/min Intravenous Titrated [facility], DO 21.6 mL/hr at 06/06/20 1012 50 mcg/kg/min at 06/06/20 1012    chlorhexidine (PERIDEX) 0.12 % solution 15 mL  15 mL Mouth/Throat BID [facility], DO   15 mL at 06/06/20 0900       Past Medical History:  Past Medical History:   Diagnosis Date    CAD (coronary artery disease) 8/19/2013    10/22/2004  Stent to LAD 12/17/2004  Cath  Patent stent in the LAD, normal LVFX   6/9/2005  Cath  Patent stent in the LAD, normal LVFX 9/19/2005  cardiolite negative for myocardial ischemia 7/24/2006  Non Q wave MI 7/24/2006  PTCA to diagonal 11/7/2007  CABG x 2 LIMA-LAD, VG-diagonal 11/2/2011  Cath  Patent LIMA-LAD, patent VG-diag, normal LVFX 3/18/2013  lexiscan  Positive for anterior and inferi    Cigarette smoker 8/19/2013    Hypercholesteremia 8/19/2013    Hypertension 8/19/2013       Past Surgical History:  Past Surgical History:   Procedure Laterality Date    CARDIAC CATHETERIZATION  8/19/13  JDT    with stent.  EF 60%    CORONARY ARTERY BYPASS GRAFT  11/7/2007    Dr. Blayne Nguyễn History  Family History   Problem Relation Age of Onset    High Blood Pressure Mother     High Blood Pressure Father     Cancer Sister        Social History  Social History     Socioeconomic History    Marital status:      Spouse name: Not on file    Number of children: Not on file    Years of education: Not on file    Highest education level: Not on file   Occupational History    Not on file   Social Needs    Financial resource strain: Not on file    Food insecurity     Worry: Not on results for input(s): CULTURE in the last 72 hours. No results for input(s): BC, Colette Rumps in the last 72 hours. No results for input(s): CXSURG in the last 72 hours. Radiology reports as per the Radiologist  Radiology: Xr Chest Portable    Result Date: 6/1/2020  Examination. XR CHEST PORTABLE 6/1/2020 7:00 AM History: Respiratory failure. A single frontal portable semiupright view of the chest is compared with the previous study dated 5/31/2020. There is moderate improvement in the right lung infiltrate since the previous study. The left lung infiltrate persist without any significant change. There is a trace pleural effusion at bilateral bases. There is no pulmonary congestion or pneumothorax. The endotracheal tube is in a stable and optimal positioning. Sternotomy sutures are seen in place without complication. No acute bony abnormality. Moderate improvement in right lung infiltrate. Persistent left lung infiltrate. A trace bibasilar pleural effusion. The above finding are recorded on a digital voice clip in PACS. Signed by Dr Prachi Leija on 6/1/2020 8:09 AM    Xr Chest Portable    Result Date: 5/31/2020  EXAMINATION:  XR CHEST PORTABLE  5/31/2020 12:25 PM HISTORY: Shortness of breath. COMPARISON: 5/30/2020. FINDINGS:  There is cardiomegaly. The central vessels are indistinct. There is increasing interstitial infiltrate bilaterally. The infiltrate is greater on the right. The patient is now intubated with endotracheal tube tip at the T3-4 level. 1. Worsening infiltrate bilaterally, likely pulmonary edema. Infection less likely. 2. Cardiomegaly. The central vessels are indistinct. There may be a small pleural effusion on the right. Signed by Dr Javier Teixeira on 5/31/2020 12:27 PM       Assessment     CHF (congestive heart failure), NYHA class IV, acute on chronic, systolic. Clinically improved. EF 15%.     Acute hypoxemic respiratory failure. Weaning as feasible.     Flash pulmonary edema

## 2020-06-06 NOTE — PROGRESS NOTES
occas---once/year    Drug use: No    Sexual activity: Not on file   Lifestyle    Physical activity     Days per week: Not on file     Minutes per session: Not on file    Stress: Not on file   Relationships    Social connections     Talks on phone: Not on file     Gets together: Not on file     Attends Christian service: Not on file     Active member of club or organization: Not on file     Attends meetings of clubs or organizations: Not on file     Relationship status: Not on file    Intimate partner violence     Fear of current or ex partner: Not on file     Emotionally abused: Not on file     Physically abused: Not on file     Forced sexual activity: Not on file   Other Topics Concern    Not on file   Social History Narrative    ** Merged History Encounter **              Review of Systems:    Review of Systems   Unable to perform ROS: Intubated           Objective:  Blood pressure 129/80, pulse 133, temperature 99.2 °F (37.3 °C), temperature source Temporal, resp. rate (!) 31, height 5' 9\" (1.753 m), weight 158 lb 9.6 oz (71.9 kg), SpO2 90 %. Intake/Output Summary (Last 24 hours) at 6/6/2020 1038  Last data filed at 6/6/2020 0600  Gross per 24 hour   Intake 3149.84 ml   Output 700 ml   Net 2449.84 ml       Physical Exam  Vitals signs reviewed. Constitutional:       Appearance: He is well-developed. HENT:      Head: Normocephalic and atraumatic. Eyes:      Conjunctiva/sclera: Conjunctivae normal.      Pupils: Pupils are equal, round, and reactive to light. Cardiovascular:      Rate and Rhythm: Regular rhythm. Tachycardia present. Heart sounds: Normal heart sounds. Pulmonary:      Effort: Pulmonary effort is normal.      Breath sounds: Rhonchi and rales present. Skin:     General: Skin is warm and dry.          Labs:  BMP:   Recent Labs     06/04/20  0557 06/05/20  0238 06/05/20  1239 06/06/20  0424 06/06/20  0435    144  --  151*  --    K 4.5 3.6 4.2 4.3 3.9    111  --  119*

## 2020-06-06 NOTE — PROGRESS NOTES
RT notified that pt had coughed out ET tube. Pt placed on bipap 14/7 and 15 lpm O2 to see if he would tolerate. Pt very tachypneic and diaphoretic. Pt reintubated at 0947. 7.5 ET tube at the 23 cm anibal at the lip.

## 2020-06-06 NOTE — PROGRESS NOTES
Pulmonary and Critical Care Progress Note [address] [facility]    Patient: Keesha Chaudhry    1955    MR# [id number]    Acct# [de-identified]   06/06/20   10:54 AM  Referring Provider: Queta Prather DO    Chief Complaint: Mechanically ventilated    Interval history: The patient did require increase in his FiO2 and PEEP. He also coughed out his endotracheal tube and has been reintubated. Chest x-ray does appear to show some degree of pulmonary vascular congestion. He is having a lot of secretions but sputum culture at present is just growing normal shlia. Medications:  bumetanide, 1 mg, Intravenous, Once    piperacillin-tazobactam, 3.375 g, Intravenous, Q12H    famotidine (PEPCID) injection, 20 mg, Intravenous, Daily    metoprolol, 5 mg, Intravenous, Q6H    aspirin, 81 mg, Oral, Daily    chlorhexidine, 15 mL, Mouth/Throat, BID   Review of Systems:   Cannot obtain due to mechanical ventilation    Physical Exam:  Blood pressure 129/80, pulse 133, temperature 99.2 °F (37.3 °C), temperature source Temporal, resp. rate (!) 31, height 5' 9\" (1.753 m), weight 158 lb 9.6 oz (71.9 kg), SpO2 90 %. Intake/Output Summary (Last 24 hours) at 6/6/2020 1054  Last data filed at 6/6/2020 0600  Gross per 24 hour   Intake 3149.84 ml   Output 700 ml   Net 2449.84 ml        Vent Mode: AC/VC/Vt Ordered: 600 mL/Rate Set: 14 bmp/Pressure Support: 5 cmH20/PEEP/CPAP: 7/FiO2 : 60 %/   Peak Inspiratory Pressure: 64 cmH2O  Mean Airway Pressure: 17 cmH20  I:E Ratio: 1:1.10  Rate Measured: 30 br/min  Minute Volume: 12 Liters           Physical Exam Vitals signs and nursing note reviewed. Constitutional:       Comments: He is an elderly -American male who is intubated on mechanical ventilatory support. HENT:      Head:      Comments: Endotracheal tube is in place. Eyes:      Pupils: Pupils are equal, round, and reactive to light. Neck:      Musculoskeletal: Normal range of motion.    Cardiovascular:      Comments:

## 2020-06-06 NOTE — PROGRESS NOTES
Nutrition Assessment    Type and Reason for Visit: Reassess    Nutrition Recommendations: continue current TF rate    Nutrition Assessment: Aware pt unable to tolerate weaning trial.  TF to restart and Propofol is now at 20.6ml/hr. Malnutrition Assessment:  · Malnutrition Status: At risk for malnutrition  · Context: Acute illness or injury  · Findings of the 6 clinical characteristics of malnutrition (Minimum of 2 out of 6 clinical characteristics is required to make the diagnosis of moderate or severe Protein Calorie Malnutrition based on AND/ASPEN Guidelines):  1. Energy Intake-Less than or equal to 50% of estimated energy requirement, Greater than or equal to 5 days    2. Weight Loss-No significant weight loss, in 1 year  3. Fat Loss-No significant subcutaneous fat loss,    4. Muscle Loss-Mild muscle mass loss,    5. Fluid Accumulation-No significant fluid accumulation,    6.   Strength-Not measured    Nutrition Risk Level: High    Nutrient Needs:  · Estimated Daily Total Kcal: 0647-6298 kcals (20-25 kcals/kg)  · Estimated Daily Protein (g): 68-138g  · Estimated Daily Total Fluid (ml/day): 3409-9809 ml    Nutrition Diagnosis:   · Problem: Inadequate oral intake  · Etiology: related to Acute injury/trauma, Impaired respiratory function-inability to consume food     Signs and symptoms:  as evidenced by NPO status due to medical condition, Intubation, Nutrition support - EN    Objective Information:  · Nutrition-Focused Physical Findings:    · Wound Type: None  · Current Nutrition Therapies:  · Oral Diet Orders: NPO   · Oral Diet intake: NPO  · Oral Nutrition Supplement (ONS) Orders: None  · ONS intake: NPO     · Anthropometric Measures:  · Ht: 5' 9\" (175.3 cm)   · Current Body Wt: 158 lb 9 oz (71.9 kg)  · Admission Body Wt: 159 lb (72.1 kg)(stated)  · Usual Body Wt: 160 lb (72.6 kg)(8/2019)  · % Weight Change:  ,  5.3% weight decrease from UBW  · [place] Body Wt: 160 lb (72.6 kg), % Ideal Body 96.9%  · BMI

## 2020-06-06 NOTE — PROGRESS NOTES
[address]    Daily HOSPITAL Progress Note                            Date:  6/6/20  Patient: Sean Cristobal  Admission:  5/31/2020 12:06 PM  Admit DX: CHF (congestive heart failure), NYHA class IV, acute on chronic, systolic (HCC) [M86.70]  Age:  71 y.o., 1955        Date of Admission 5/31/2020 12:06 PM   Hospital Length of Stay  LOS: 6 days        Date / Time Of Initially Being Seen For This Daily Visit:  6/6/20, at approximately 1100. At that time, I personally saw the patient and rounded with:  Wilson Serna RN    Date / Time That This Note Is Written:  6/6/2020  at  1:34 PM        The observations documented in this note, including the assessment   and plan are mine    Portions of this note have been copied forward, from prior notes, yet modified, to reflect today's clinical status of this patient. Reason for initial evaluation or the patient's initial complaint    1. Reason for Hospital Admission: dyspena        2. Reason for Cardiology Consultation: CHF (see below)         SUBJECTIVE:      Chief Complaint / Reason for the Visit   Follow up of:  dyspnea and CHF and systemic arterial hypertension    Family present and in room during examination:  No    At the time of the examination, the patient was:  Pulled ET tube out, had to get reintubated      Specialty Problems        Cardiology Problems    ACS (acute coronary syndrome) ([address]        CAD (coronary artery disease)        Hypercholesteremia        CAD (coronary artery disease)        Hypercholesteremia        Hypertension        Atypical chest pain        CHF (congestive heart failure), NYHA class IV, acute on chronic, systolic (HCC)              Current Status Today According to the patient:  Today he is feeling \"0\". Subjective:  Mr. Sean Cristobal is generally feeling unchanged. As noted    Mr. Sean Cristobal has the following cardiac complaints / symptoms today:    1. dyspnea, remains intubated    2.  CHF, PRN Ana Laura Santiagong, DO        propofol injection  10 mcg/kg/min Intravenous Titrated Ana Laura Latoniateresang, DO 21.6 mL/hr at 06/06/20 1012 50 mcg/kg/min at 06/06/20 1012    chlorhexidine (PERIDEX) 0.12 % solution 15 mL  15 mL Mouth/Throat BID Ana Laura Latoniajohanna, DO   15 mL at 06/06/20 0900     Allergies: Sulfa antibiotics and Iv contrast [iodides]  Past Medical History:   Diagnosis Date    CAD (coronary artery disease) 8/19/2013    10/22/2004  Stent to LAD 12/17/2004  Cath  Patent stent in the LAD, normal LVFX   6/9/2005  Cath  Patent stent in the LAD, normal LVFX 9/19/2005  cardiolite negative for myocardial ischemia 7/24/2006  Non Q wave MI 7/24/2006  PTCA to diagonal 11/7/2007  CABG x 2 LIMA-LAD, VG-diagonal 11/2/2011  Cath  Patent LIMA-LAD, patent VG-diag, normal LVFX 3/18/2013  lexiscan  Positive for anterior and inferi    Cigarette smoker 8/19/2013    Hypercholesteremia 8/19/2013    Hypertension 8/19/2013     Past Surgical History:   Procedure Laterality Date    CARDIAC CATHETERIZATION  8/19/13  JDT    with stent.  EF 60%    CORONARY ARTERY BYPASS GRAFT  11/7/2007    Dr. Fauzia Cary       Family History   Problem Relation Age of Onset    High Blood Pressure Mother     High Blood Pressure Father     Cancer Sister      Social History     Tobacco Use    Smoking status: Current Every Day Smoker     Packs/day: 0.50     Years: 14.00     Pack years: 7.00    Smokeless tobacco: Never Used   Substance Use Topics    Alcohol use: Yes     Comment: occas---once/year          Review of Systems:    General:      Complaint / Symptom Yes / No / Description if Yes       Fatigue Yes:  chronic   Weight gain NA   Insomnia NA       Respiratory:        Complaint / Symptom Yes / No / Description if Yes       Cough No   Horseness NA       Cardiovascular:    Complaint / Symptom Yes / No / Description if Yes       Chest Pain N/A   Shortness of Air / Orthopnea Yes: chronic and intubated   Presyncope / disease:  Yes: 11/7/2007  CABG x 2 LIMA-LAD, VG-diag Deion Goltz)  Diabetes:  no  Hypertension:  yes  Medications / Illicit Drugs:  No  Valvular heart disease:  No  Miscellaneous causes:  No           Diastolic: No Yes: BNP = 6,410     Age:  > 60 years:  Yes: 64  Coronary artery disease:  Yes: 11/7/2007  CABG x 2 LIMA-LAD, VG-diag Kendrick Goltz)  Diabetes:  no  Gender:   female:  no   Hypertension:  yes  Obesity:  BMI > 30:  no  Valvular heart disease:  No  Miscellaneous causes:  Yes: chronic kidney disease                With this data, I believe the patient had:  Acute on chronic systolic and acute diastolic congestive heart failure        Summary of hospital course thus far:        Date Clinical Event Plan of Treatment           6/2/2020 Admitted [id number] [place] [address]  Cardiology Concern:  CHF  Echo and support   6/3/2020 Seen no real change, note not written Continue support   6/4/2020 Remains intubated, looks about the same, EF 15%, seen on 6/4/2020 with Ivan Barlow Continue maximal support, cath when extubated    06/05/20  Working on getting him off the vent As per pulmonary    06/06/20  Self extubated and reintubated Continue to diuresis                    CONSIDERATIONS, THOUGHTS, AND PLANS:     4. Continue present medications except for changes as noted above  5. Continue to monitor rhythm  6.  Further orders per clinical course.        Discussed with patient and nursing.     Electronically signed by Joan Holley MD on 06/06/20             [facility] [place] [facility]

## 2020-06-06 NOTE — PROGRESS NOTES
Went into the room to suction pt and he had coughed out ET tube and contacted RT and Dr. Chuck Mckeon. Pt originally placed on bipap with 15 L O2 which he was not able to tolerate and became tachypneic and diaphoretic. Pt reintubated at [phone] with ET at 23 cm at lip.

## 2020-06-06 NOTE — PROGRESS NOTES
Nephrology ([facility]) Progress Note      Patient:  Hayley Torrez  YOB: 1955  Date of Service: 6/6/2020  MRN: [id number]   Acct: [de-identified]   Primary Care Physician: JANNET Grullon  Advance Directive: Full Code  Admit Date: 5/31/2020       Hospital Day: 6  Referring Provider: Ana Laura Atkins DO    Patient independently seen and examined, Chart, Consults, Notes, Operative notes, Labs, Cardiology, and Radiology studies reviewed as able. Chief complaint: Abnormal labs. Subjective: Hayley Torrez is a 71 y.o. male  whom we were consulted for acute kidney injury. Patient denies any history of chronic kidney disease. His serum creatinine on admission was 1.2 mg. He presented with hypoxemic respiratory failure and was diagnosed with congestive heart failure/fluid overloaded. Hospital course remarkable for treatment with intravenous Bumex drip and has excellent diuresis. He is currently on a ventilator and attempt for extubation is currently in progress. Chest x-ray has significantly improved. However his serum creatinine has risen to 3.4 mg. Patient remains in ICU/intubated, currently receiving slow hydration and has no improvement of renal function. Repeat chest x-ray shows persistent left lower lobe infiltrate consistent with pneumonia. Rest of the lungs are clear. Patient has urine output and has significant improvement of renal function. On June 5, patient was extubated, he is reintubated this morning as he has excessive amount of secretion. He is currently back on ventilator. His renal function remained stable.     Allergies:  Sulfa antibiotics and Iv contrast [iodides]    Medicines:  Current Facility-Administered Medications   Medication Dose Route Frequency Provider Last Rate Last Dose    nitroGLYCERIN 50 mg in dextrose 5% 250 mL infusion  5 mcg/min Intravenous Continuous Ana Laura Atkins DO 1.5 mL/hr at 06/06/20 1011 5 mcg/min at 06/06/20 1011    bumetanide (BUMEX) injection 1 mg  1 mg Intravenous Once Slade Dries, DO        piperacillin-tazobactam (ZOSYN) 3.375 g in dextrose 5 % 50 mL IVPB extended infusion (mini-bag)  3.375 g Intravenous Q12H Flaca Polanco MD   Stopped at 06/06/20 0239    dilTIAZem 125 mg in dextrose 5 % 125 mL infusion  5 mg/hr Intravenous Continuous Carliss Drjohn, DO   Stopped at 06/05/20 6609    famotidine (PEPCID) injection 20 mg  20 mg Intravenous Daily Slade Segura, DO   20 mg at 06/05/20 2047    metoprolol (LOPRESSOR) injection 5 mg  5 mg Intravenous Q6H Slade Segura, DO   5 mg at 06/06/20 4275    aspirin chewable tablet 81 mg  81 mg Oral Daily Slade Segura, DO   81 mg at 06/05/20 8395    perflutren lipid microspheres (DEFINITY) injection 1.65 mg  1.5 mL Intravenous ONCE PRN Slade Segura, DO        propofol injection  10 mcg/kg/min Intravenous Titrated Slade Segura, DO 21.6 mL/hr at 06/06/20 1012 50 mcg/kg/min at 06/06/20 1012    chlorhexidine (PERIDEX) 0.12 % solution 15 mL  15 mL Mouth/Throat BID Slade Segura, DO   15 mL at 06/06/20 0900       Past Medical History:  Past Medical History:   Diagnosis Date    CAD (coronary artery disease) 8/19/2013    10/22/2004  Stent to LAD 12/17/2004  Cath  Patent stent in the LAD, normal LVFX   6/9/2005  Cath  Patent stent in the LAD, normal LVFX 9/19/2005  cardiolite negative for myocardial ischemia 7/24/2006  Non Q wave MI 7/24/2006  PTCA to diagonal 11/7/2007  CABG x 2 LIMA-LAD, VG-diagonal 11/2/2011  Cath  Patent LIMA-LAD, patent VG-diag, normal LVFX 3/18/2013  lexiscan  Positive for anterior and inferi    Cigarette smoker 8/19/2013    Hypercholesteremia 8/19/2013    Hypertension 8/19/2013       Past Surgical History:  Past Surgical History:   Procedure Laterality Date    CARDIAC CATHETERIZATION  8/19/13  JDT    with stent.  EF 60%    CORONARY ARTERY BYPASS GRAFT  11/7/2007    Dr. Bella David 14.5 10.1* 12.1*   HCT 43.2 29.5* 35.6*   MCV 85.2 85.3 85.2   * 121* 127*     LIVER PROFILE:   Recent Labs     06/04/20  0557 06/05/20 0238 06/06/20 0424   AST 26 53* 105*   ALT 13 19 44*   BILITOT 1.9* 1.1 1.0   ALKPHOS 68 63 96     PT/INR:   No results for input(s): PROTIME, INR in the last 72 hours. APTT:   No results for input(s): APTT in the last 72 hours. BNP:  No results for input(s): BNP in the last 72 hours. Ionized Calcium:No results for input(s): IONCA in the last 72 hours. Magnesium:  Recent Labs     06/04/20 0557 06/05/20 0238   MG 2.3 2.1     Phosphorus:No results for input(s): PHOS in the last 72 hours. HgbA1C: No results for input(s): LABA1C in the last 72 hours. Hepatic:   Recent Labs     06/04/20  0557 06/05/20 0238 06/06/20 0424   ALKPHOS 68 63 96   ALT 13 19 44*   AST 26 53* 105*   PROT 6.5* 5.1* 6.1*   BILITOT 1.9* 1.1 1.0   LABALBU 2.8* 1.6* 2.1*     Lactic Acid:   No results for input(s): LACTA in the last 72 hours. Troponin: No results for input(s): CKTOTAL, CKMB, TROPONINT in the last 72 hours. ABGs: No results for input(s): PH, PCO2, PO2, HCO3, O2SAT in the last 72 hours. CRP:  No results for input(s): CRP in the last 72 hours. Sed Rate:  No results for input(s): SEDRATE in the last 72 hours. Cultures:   No results for input(s): CULTURE in the last 72 hours. No results for input(s): BC, Odean Boards in the last 72 hours. No results for input(s): CXSURG in the last 72 hours. Radiology reports as per the Radiologist  Radiology: Xr Chest Portable    Result Date: 6/1/2020  Examination. XR CHEST PORTABLE 6/1/2020 7:00 AM History: Respiratory failure. A single frontal portable semiupright view of the chest is compared with the previous study dated 5/31/2020. There is moderate improvement in the right lung infiltrate since the previous study. The left lung infiltrate persist without any significant change. There is a trace pleural effusion at bilateral bases.  There occas---once/year    Drug use: No    Sexual activity: Not on file   Lifestyle    Physical activity     Days per week: Not on file     Minutes per session: Not on file    Stress: Not on file   Relationships    Social connections     Talks on phone: Not on file     Gets together: Not on file     Attends Sikh service: Not on file     Active member of club or organization: Not on file     Attends meetings of clubs or organizations: Not on file     Relationship status: Not on file    Intimate partner violence     Fear of current or ex partner: Not on file     Emotionally abused: Not on file     Physically abused: Not on file     Forced sexual activity: Not on file   Other Topics Concern    Not on file   Social History Narrative    ** Merged History Encounter **              Review of Systems:  Review of system is not possible as he is intubated and sedated.   Objective:  Patient Vitals for the past 24 hrs:   BP Temp Temp src Pulse Resp SpO2   06/06/20 1007 -- -- -- 133 (!) 31 90 %   06/06/20 0801 129/80 99.2 °F (37.3 °C) Temporal 79 22 95 %   06/06/20 0800 -- -- -- 78 -- --   06/06/20 0621 -- -- -- 89 22 96 %   06/06/20 0600 135/81 -- -- 87 21 96 %   06/06/20 0500 133/80 -- -- 86 23 95 %   06/06/20 0437 -- -- -- -- 23 94 %   06/06/20 0400 (!) 152/88 100.2 °F (37.9 °C) Temporal 87 20 97 %   06/06/20 0300 (!) 148/90 -- -- 82 19 99 %   06/06/20 0200 117/77 -- -- 78 20 97 %   06/06/20 0100 111/72 -- -- 90 20 95 %   06/06/20 0000 133/89 100.7 °F (38.2 °C) Temporal 105 20 97 %   06/05/20 2300 129/75 99.8 °F (37.7 °C) Temporal 97 16 (!) 86 %   06/05/20 2207 -- -- -- 92 15 (!) 89 %   06/05/20 2200 110/73 -- -- 94 22 93 %   06/05/20 2100 110/70 -- -- 97 24 91 %   06/05/20 2000 132/79 99 °F (37.2 °C) Temporal 93 23 94 %   06/05/20 1900 134/83 -- -- 95 20 96 %   06/05/20 1832 -- -- -- -- 19 95 %   06/05/20 1827 -- -- -- 94 20 94 %   06/05/20 1700 132/80 -- -- 94 16 94 %   06/05/20 1600 (!) 147/79 98.8 °F (37.1 °C) Temporal 102 20 --   06/05/20 1457 -- -- -- 100 25 96 %   06/05/20 1200 128/82 98.7 °F (37.1 °C) Temporal 84 26 98 %   06/05/20 1100 136/83 -- -- 95 25 96 %       Intake/Output Summary (Last 24 hours) at 6/6/2020 1029  Last data filed at 6/6/2020 0600  Gross per 24 hour   Intake 3149.84 ml   Output 700 ml   Net 2449.84 ml     General: Intubated/sedated. HEENT: Normocephalic atraumatic head/orotracheal intubation. Neck: Supple with no JVD or carotid bruits. Chest:  clear to auscultation bilaterally without respiratory distress  CVS: regular rate and rhythm  Abdominal: soft, nontender, normal bowel sounds  Extremities: no cyanosis or edema  Skin: warm and dry without rash      Labs:  BMP:   Recent Labs     06/04/20  0557 06/05/20  0238 06/05/20  1239 06/06/20  0424 06/06/20  0435    144  --  151*  --    K 4.5 3.6 4.2 4.3 3.9    111  --  119*  --    CO2 22 18*  --  21*  --    BUN 80* 72*  --  84*  --    CREATININE 4.3* 2.8*  --  2.9*  --    CALCIUM 8.8 7.1*  --  8.8  --      CBC:   Recent Labs     06/04/20  0306 06/05/20  0238 06/06/20  0424   WBC 20.7* 13.9* 15.2*   HGB 14.5 10.1* 12.1*   HCT 43.2 29.5* 35.6*   MCV 85.2 85.3 85.2   * 121* 127*     LIVER PROFILE:   Recent Labs     06/04/20  0557 06/05/20  0238 06/06/20  0424   AST 26 53* 105*   ALT 13 19 44*   BILITOT 1.9* 1.1 1.0   ALKPHOS 68 63 96     PT/INR:   No results for input(s): PROTIME, INR in the last 72 hours. APTT:   No results for input(s): APTT in the last 72 hours. BNP:  No results for input(s): BNP in the last 72 hours. Ionized Calcium:No results for input(s): IONCA in the last 72 hours. Magnesium:  Recent Labs     06/04/20  0557 06/05/20  0238   MG 2.3 2.1     Phosphorus:No results for input(s): PHOS in the last 72 hours. HgbA1C: No results for input(s): LABA1C in the last 72 hours.   Hepatic:   Recent Labs     06/04/20  0557 06/05/20  0238 06/06/20  0424   ALKPHOS 68 63 96   ALT 13 19 44*   AST 26 53* 105*   PROT 6.5* 5.1* 6.1*   BILITOT 1.9* 1.1 1.0   LABALBU 2.8* 1.6* 2.1*     Lactic Acid:   No results for input(s): LACTA in the last 72 hours. Troponin: No results for input(s): CKTOTAL, CKMB, TROPONINT in the last 72 hours. ABGs: No results for input(s): PH, PCO2, PO2, HCO3, O2SAT in the last 72 hours. CRP:  No results for input(s): CRP in the last 72 hours. Sed Rate:  No results for input(s): SEDRATE in the last 72 hours. Cultures:   No results for input(s): CULTURE in the last 72 hours. No results for input(s): BC, Jennett Gains in the last 72 hours. No results for input(s): CXSURG in the last 72 hours. Radiology reports as per the Radiologist  Radiology: Xr Chest Portable    Result Date: 6/1/2020  Examination. XR CHEST PORTABLE 6/1/2020 7:00 AM History: Respiratory failure. A single frontal portable semiupright view of the chest is compared with the previous study dated 5/31/2020. There is moderate improvement in the right lung infiltrate since the previous study. The left lung infiltrate persist without any significant change. There is a trace pleural effusion at bilateral bases. There is no pulmonary congestion or pneumothorax. The endotracheal tube is in a stable and optimal positioning. Sternotomy sutures are seen in place without complication. No acute bony abnormality. Moderate improvement in right lung infiltrate. Persistent left lung infiltrate. A trace bibasilar pleural effusion. The above finding are recorded on a digital voice clip in PACS. Signed by Dr Umu Lieberman on 6/1/2020 8:09 AM    Xr Chest Portable    Result Date: 5/31/2020  EXAMINATION:  XR CHEST PORTABLE  5/31/2020 12:25 PM HISTORY: Shortness of breath. COMPARISON: 5/30/2020. FINDINGS:  There is cardiomegaly. The central vessels are indistinct. There is increasing interstitial infiltrate bilaterally. The infiltrate is greater on the right. The patient is now intubated with endotracheal tube tip at the T3-4 level.     1. Worsening infiltrate

## 2020-06-07 ENCOUNTER — OUTSIDE FACILITY SERVICE (OUTPATIENT)
Dept: PULMONOLOGY | Facility: CLINIC | Age: 65
End: 2020-06-07

## 2020-06-07 PROCEDURE — 99233 SBSQ HOSP IP/OBS HIGH 50: CPT | Performed by: INTERNAL MEDICINE

## 2020-06-07 NOTE — PROGRESS NOTES
Pulmonary and Critical Care Progress Note [address] [facility]    Patient: Vicky Husain    1955    MR# [id number]    Acct# [de-identified]   06/07/20   12:53 PM  Referring Provider: Francois Solares DO    Chief Complaint: Mechanically ventilated    Interval history: The patient currently is on 50% FiO2 and 7 of PEEP with O2 sats running in the low to mid 90s. Rest x-ray shows a persistent pulmonary edema pattern. He does have positive sputum cultures for strep pneumoniae light growth. This likely is a colonizer but the Zosyn that he is receiving would cover this organism particularly in view of the fact that it is penicillin sensitive. I still think the major issue and wean the patient this point is going to be his cardiac status. He is still having secretions but these are less pronounced than they were yesterday per his nurse. Medications:  hydroCHLOROthiazide, 25 mg, Per G Tube, Daily    lisinopril, 2.5 mg, Oral, BID    carvedilol, 3.125 mg, Oral, BID WC    piperacillin-tazobactam, 3.375 g, Intravenous, Q12H    famotidine (PEPCID) injection, 20 mg, Intravenous, Daily    aspirin, 81 mg, Oral, Daily    chlorhexidine, 15 mL, Mouth/Throat, BID   Review of Systems:   Cannot obtain due to mechanical ventilation    Physical Exam:  Blood pressure 114/87, pulse 108, temperature 100 °F (37.8 °C), temperature source Temporal, resp. rate 25, height 5' 9\" (1.753 m), weight 157 lb (71.2 kg), SpO2 93 %. Intake/Output Summary (Last 24 hours) at 6/7/2020 1253  Last data filed at 6/7/2020 0903  Gross per 24 hour   Intake 1526.67 ml   Output 1935 ml   Net -408.33 ml        Vent Mode: AC/VC/Vt Ordered: 600 mL/Rate Set: 14 bmp/Pressure Support: 5 cmH20/PEEP/CPAP: 7/FiO2 : 50 %/   Peak Inspiratory Pressure: 25 cmH2O  Mean Airway Pressure: 14 cmH20  I:E Ratio: 1:1.40  Rate Measured: 25 br/min  Minute Volume: 10 Liters           Physical Exam Vitals signs and nursing note reviewed.    Constitutional:

## 2020-06-07 NOTE — PROGRESS NOTES
Nephrology ([facility]) Progress Note      Patient:  Sherwin Britton  YOB: 1955  Date of Service: 6/7/2020  MRN: [id number]   Acct: [de-identified]   Primary Care Physician: JANNET Grace  Advance Directive: Full Code  Admit Date: 5/31/2020       Hospital Day: 7  Referring Provider: Donna Kessler DO    Patient independently seen and examined, Chart, Consults, Notes, Operative notes, Labs, Cardiology, and Radiology studies reviewed as able. Chief complaint: Abnormal labs. Subjective: Sherwin Britton is a 71 y.o. male  whom we were consulted for acute kidney injury. No known history of chronic kidney disease. His serum creatinine on admission was 1.2 mg. He presented with hypoxemic respiratory failure and was diagnosed with congestive heart failure/fluid overload. Hospital course remarkable for treatment with intravenous Bumex drip and has excellent diuresis. He is currently on a ventilator and has failed weaning off the vent. Chest x-ray has significantly improved. However his serum creatinine has risen to 3.4 mg. Patient remains in ICU/intubated, currently receiving slow hydration and has no improvement of renal function. Repeat chest x-ray shows persistent left lower lobe infiltrate consistent with pneumonia. Rest of the lungs are clear. Patient is non-oliguric. On June 5, patient was self-extubated and was then reintubated as he has excessive amount of secretion. He remained on ventilator.   His renal function was initially improving till this AM.     Allergies:  Morphine; Sulfa antibiotics; and Iv contrast [iodides]    Medicines:  Current Facility-Administered Medications   Medication Dose Route Frequency Provider Last Rate Last Dose    nitroGLYCERIN 50 mg in dextrose 5% 250 mL infusion  5 mcg/min Intravenous Continuous Donna Kessler DO 1.5 mL/hr at 06/06/20 1011 5 mcg/min at 06/06/20 1011    morphine injection 2 mg  2 mg Intravenous Q4H PRN Ivy Wilcox MD   2 mg at 06/07/20 0006    piperacillin-tazobactam (ZOSYN) 3.375 g in dextrose 5 % 50 mL IVPB extended infusion (mini-bag)  3.375 g Intravenous Q12H Joanne Cook MD 12.5 mL/hr at 06/07/20 0926 3.375 g at 06/07/20 0926    dilTIAZem 125 mg in dextrose 5 % 125 mL infusion  5 mg/hr Intravenous Continuous Hulan Handler, DO   Stopped at 06/05/20 0219    famotidine (PEPCID) injection 20 mg  20 mg Intravenous Daily Hulan Handler, DO   20 mg at 06/06/20 2058    metoprolol (LOPRESSOR) injection 5 mg  5 mg Intravenous Q6H Hulan Handler, DO   5 mg at 06/07/20 2047    aspirin chewable tablet 81 mg  81 mg Oral Daily Hulan Handler, DO   81 mg at 06/07/20 0379    perflutren lipid microspheres (DEFINITY) injection 1.65 mg  1.5 mL Intravenous ONCE PRN Hulan Handler, DO        propofol injection  10 mcg/kg/min Intravenous Titrated Hulan Handler, DO 19.5 mL/hr at 06/07/20 0811 45 mcg/kg/min at 06/07/20 0811    chlorhexidine (PERIDEX) 0.12 % solution 15 mL  15 mL Mouth/Throat BID Hulan Handler, DO   15 mL at 06/07/20 5517       Past Medical History:  Past Medical History:   Diagnosis Date    CAD (coronary artery disease) 8/19/2013    10/22/2004  Stent to LAD 12/17/2004  Cath  Patent stent in the LAD, normal LVFX   6/9/2005  Cath  Patent stent in the LAD, normal LVFX 9/19/2005  cardiolite negative for myocardial ischemia 7/24/2006  Non Q wave MI 7/24/2006  PTCA to diagonal 11/7/2007  CABG x 2 LIMA-LAD, VG-diagonal 11/2/2011  Cath  Patent LIMA-LAD, patent VG-diag, normal LVFX 3/18/2013  lexiscan  Positive for anterior and inferi    Cigarette smoker 8/19/2013    Hypercholesteremia 8/19/2013    Hypertension 8/19/2013       Past Surgical History:  Past Surgical History:   Procedure Laterality Date    CARDIAC CATHETERIZATION  8/19/13  JDT    with stent.  EF 60%    CORONARY ARTERY BYPASS GRAFT  11/7/2007    Dr. Tavera Lionel History  Family --  21*  --  21*  --    BUN 72*  --  84*  --  90*  --    CREATININE 2.8*  --  2.9*  --  3.1*  --    CALCIUM 7.1*  --  8.8  --  9.1  --     < > = values in this interval not displayed. CBC:   Recent Labs     06/05/20 0238 06/06/20 0424 06/07/20 0312   WBC 13.9* 15.2* 18.8*   HGB 10.1* 12.1* 12.0*   HCT 29.5* 35.6* 34.8*   MCV 85.3 85.2 85.5   * 127* 130     LIVER PROFILE:   Recent Labs     06/05/20 0238 06/06/20 0424 06/07/20 0312   AST 53* 105* 84*   ALT 19 44* 45*   BILITOT 1.1 1.0 1.1   ALKPHOS 63 96 95     PT/INR:   No results for input(s): PROTIME, INR in the last 72 hours. APTT:   No results for input(s): APTT in the last 72 hours. BNP:  No results for input(s): BNP in the last 72 hours. Ionized Calcium:No results for input(s): IONCA in the last 72 hours. Magnesium:  Recent Labs     06/05/20 0238   MG 2.1     Phosphorus:No results for input(s): PHOS in the last 72 hours. HgbA1C: No results for input(s): LABA1C in the last 72 hours. Hepatic:   Recent Labs     06/05/20 0238 06/06/20 0424 06/07/20 0312   ALKPHOS 63 96 95   ALT 19 44* 45*   AST 53* 105* 84*   PROT 5.1* 6.1* 6.3*   BILITOT 1.1 1.0 1.1   LABALBU 1.6* 2.1* 2.0*     Lactic Acid:   No results for input(s): LACTA in the last 72 hours. Troponin: No results for input(s): CKTOTAL, CKMB, TROPONINT in the last 72 hours. ABGs: No results for input(s): PH, PCO2, PO2, HCO3, O2SAT in the last 72 hours. CRP:  No results for input(s): CRP in the last 72 hours. Sed Rate:  No results for input(s): SEDRATE in the last 72 hours. Cultures:   No results for input(s): CULTURE in the last 72 hours. No results for input(s): BC, Lowery Maffucci in the last 72 hours. No results for input(s): CXSURG in the last 72 hours. Radiology reports as per the Radiologist  Radiology: Xr Chest Portable    Result Date: 6/1/2020  Examination. XR CHEST PORTABLE 6/1/2020 7:00 AM History: Respiratory failure.  A single frontal portable semiupright view of the

## 2020-06-07 NOTE — PROGRESS NOTES
Hospitalist Progress Note    Patient:  Ahmet Keating  YOB: 1955  Date of Service: 6/7/2020  MRN: [id number]   Acct: [de-identified]   Primary Care Physician: JANNET Noel  Advance Directive: Full Code  Admit Date: 5/31/2020       Hospital Day: 7  Referring Provider: Octavio Bundy DO    Patient Seen, Chart, Consults, Notes, Labs, Radiology studies reviewed. Subjective: Ahmet Keating is a 71 y.o. male  whom we are following for hypoxemic respiratory failure, healthcare associated pneumonia, acute kidney injury, acute on chronic systolic heart failure. Hemodynamics are improved. He is in a negative fluid balance. Chest x-ray still shows evidence of volume overload. However his left lower lobe infiltrate is improved. He still is having copious amounts of secretions. GFR has basically plateaued with a creatinine of around 3. His white blood cell count is 18,000. Given his significant LV dysfunction and other organ system failure, I am concerned that he is going to be a very difficult wean. We may have to begin having discussions with his wife regarding trach and PEG placement. Continue ongoing aggressive care for now.     Allergies:  Morphine; Sulfa antibiotics; and Iv contrast [iodides]    Medicines:  Current Facility-Administered Medications   Medication Dose Route Frequency Provider Last Rate Last Dose    dextrose 5 % solution   Intravenous Continuous Edgar Chávez MD        hydroCHLOROthiazide (HYDRODIURIL) tablet 25 mg  25 mg Per G Tube Daily Edgar Chávez MD        nitroGLYCERIN 50 mg in dextrose 5% 250 mL infusion  5 mcg/min Intravenous Continuous Octavio Bundy DO 1.5 mL/hr at 06/06/20 1011 5 mcg/min at 06/06/20 1011    morphine injection 2 mg  2 mg Intravenous Q4H PRN Cristela Briceno MD   2 mg at 06/07/20 0006    piperacillin-tazobactam (ZOSYN) 3.375 g in dextrose 5 % 50 mL IVPB extended infusion (mini-bag)  3.375 g Intravenous Q12H Kourtney Hung MD 12.5 well-developed. HENT:      Head: Normocephalic and atraumatic. Eyes:      Conjunctiva/sclera: Conjunctivae normal.   Cardiovascular:      Rate and Rhythm: Normal rate and regular rhythm. Heart sounds: Normal heart sounds. Pulmonary:      Effort: Pulmonary effort is normal.      Breath sounds: Normal breath sounds. Abdominal:      General: Abdomen is flat. Palpations: Abdomen is soft. Skin:     General: Skin is warm and dry. Labs:  BMP:   Recent Labs     06/05/20 0238 06/06/20 0424 06/06/20 0435 06/07/20 0312 06/07/20 0439     --  151*  --  152*  --    K 3.6   < > 4.3 3.9 3.9 4.0     --  119*  --  117*  --    CO2 18*  --  21*  --  21*  --    BUN 72*  --  84*  --  90*  --    CREATININE 2.8*  --  2.9*  --  3.1*  --    CALCIUM 7.1*  --  8.8  --  9.1  --     < > = values in this interval not displayed. CBC:   Recent Labs     06/05/20 0238 06/06/20 0424 06/07/20 0312   WBC 13.9* 15.2* 18.8*   HGB 10.1* 12.1* 12.0*   HCT 29.5* 35.6* 34.8*   MCV 85.3 85.2 85.5   * 127* 130     LIVER PROFILE:   Recent Labs     06/05/20 0238 06/06/20 0424 06/07/20 0312   AST 53* 105* 84*   ALT 19 44* 45*   BILITOT 1.1 1.0 1.1   ALKPHOS 63 96 95     PT/INR: No results for input(s): PROTIME, INR in the last 72 hours. APTT: No results for input(s): APTT in the last 72 hours. BNP:  No results for input(s): BNP in the last 72 hours. Ionized Calcium:No results for input(s): IONCA in the last 72 hours. Magnesium:  Recent Labs     06/05/20 0238   MG 2.1     Phosphorus:No results for input(s): PHOS in the last 72 hours. HgbA1C: No results for input(s): LABA1C in the last 72 hours. Hepatic:   Recent Labs     06/05/20  0238 06/06/20  0424 06/07/20  0312   ALKPHOS 63 96 95   ALT 19 44* 45*   AST 53* 105* 84*   PROT 5.1* 6.1* 6.3*   BILITOT 1.1 1.0 1.1   LABALBU 1.6* 2.1* 2.0*     Lactic Acid: No results for input(s): LACTA in the last 72 hours.   Troponin: No results for input(s):

## 2020-06-08 ENCOUNTER — OUTSIDE FACILITY SERVICE (OUTPATIENT)
Dept: PULMONOLOGY | Facility: CLINIC | Age: 65
End: 2020-06-08

## 2020-06-08 PROCEDURE — 99233 SBSQ HOSP IP/OBS HIGH 50: CPT | Performed by: INTERNAL MEDICINE

## 2020-06-08 NOTE — PROGRESS NOTES
Contains abnormal data Blood Gas, Arterial   Status:  Final result    Ref Range & Units 06/08/20 0427   pH, Arterial 7.350 - 7.450 7.410    pCO2, Arterial 35.0 - 45.0 mmHg 34. 0Low     pO2, Arterial 80.0 - 100.0 mmHg 74. 0Low     HCO3, Arterial 22.0 - 26.0 mmol/L 21.6Low     Base Excess, Arterial -2.0 - 2.0 mmol/L -2.4Low     Hemoglobin, Art, Extended 14.0 - 18.0 g/dL 12.5Low     O2 Sat, Arterial >92 % 93.6    Carboxyhgb, Arterial 0.0 - 5.0 % 2.3    Comment:      0.0-1.5   (Smokers 1.5-5.0)    Methemoglobin, Arterial <1.5 % 1.6    O2 Content, Arterial Not Established mL/dL 16.5    O2 Therapy  Unknown    Resulting Agency  [address] [facility]        Specimen Collected: 06/08/20 0427 Last Resulted: 06/08/20 0427 View Other Order Details        VC 14, , 50% FIO2, 7 PEEP  LR

## 2020-06-08 NOTE — PROGRESS NOTES
bilaterally. The infiltrate is greater on the right. The patient is now intubated with endotracheal tube tip at the T3-4 level. 1. Worsening infiltrate bilaterally, likely pulmonary edema. Infection less likely. 2. Cardiomegaly. The central vessels are indistinct. There may be a small pleural effusion on the right. Signed by Dr Rocio Tafoya on 5/31/2020 12:27 PM       Assessment   1. Acute kidney injury- cardiorenal syndrome  2. Congestive heart failure exacerbation  3. History of coronary artery disease. 4.  Benign essential hypertension. 5.  Hypercholesteremia. .  6.  Left lung infiltrate/pneumonia  7. Hypernatremia    Plan:  1. No IV fluids for now  2. Multiorgan failure  3. Bumex was held  4. IV antibiotics  5. Would hold HCTZ and increase D5W  6. Very poor prognosis, discussed with patient's daughter and CCU nursing staff. Suggest comfort care/ hospice  7. Patient would be a very poor candidate for dialysis.        Edgar Chávez MD  06/08/20  10:31 AM

## 2020-06-08 NOTE — CARE COORDINATION
Remains intubated at this time and not a candidate for weaning trails per Pulmonology documentation. Possible LTAC candidate?

## 2020-06-08 NOTE — PROGRESS NOTES
Pulmonary and Critical Care Progress Note [address] [facility]    Patient: Gautam Jameson    1955    MR# [id number]    Acct# [de-identified]   06/08/20   10:22 AM  Referring Provider: Geraldine Pinto DO    Chief Complaint: Mechanically ventilated    Interval history: The patient's chest x-ray does show improvement to my review and his secretions are less per his nurse. Ever he has spiked a temp and increase in his white count and there is concern he may be developing intravascular volume depletion. In this regard probably not feasible to proceed with any weaning trials especially if he is going to receive some IV fluids which could result in worsening pulmonary vascular congestion which again appears improved on today's chest x-ray. Sputum cultures have light growth of both Streptococcus pneumoniae and Haemophilus influenza which are extremely common colonizers in patients who have any underlying chronic lung disease. The Zosyn therapy he is receiving should cover both these organisms. However as again he spiked a temp despite this regimen and there is concern about some other source of infection IV Levaquin has been added blood cultures obtained. Medications:    metoprolol tartrate, 25 mg, Oral, BID    levofloxacin, 250 mg, Intravenous, Q24H    hydroCHLOROthiazide, 25 mg, Per G Tube, Daily    lisinopril, 2.5 mg, Oral, BID    carvedilol, 3.125 mg, Oral, BID WC    piperacillin-tazobactam, 3.375 g, Intravenous, Q12H    famotidine (PEPCID) injection, 20 mg, Intravenous, Daily    aspirin, 81 mg, Oral, Daily    chlorhexidine, 15 mL, Mouth/Throat, BID   Review of Systems:   Cannot obtain due to mechanical ventilation    Physical Exam:  Blood pressure 113/74, pulse 98, temperature 97.6 °F (36.4 °C), temperature source Temporal, resp. rate 28, height 5' 9\" (1.753 m), weight 157 lb (71.2 kg), SpO2 92 %.     Intake/Output Summary (Last 24 hours) at 6/8/2020 1022  Last data filed at 6/8/2020 0600  Gross

## 2020-06-08 NOTE — CONSULTS
Palliative Care Consult Note  6/8/2020 2:05 PM  Subjective:   Admit Date: 5/31/2020  PCP: JANNET Gordon Si    Reason For Consult: Goals of care, Code status, Family Support    Requesting Physician: Dr. Sheridan Reed    History Obtained From: EMR, nursing, patient's family    Chief Complaint: sedated and intubated    History of Present Illness: Patient is a 71 yr old male with a PMH of CHF, CAD s/p stents, HTN that presented to the emergency department on 5/31/2020 due to respiratory distress. Was evaluated previous day and ED for symptoms of CHF but was treated as an outpatient with improvement symptoms began to worsen overnight. Pt also had an admission to [facility] [place] [facility] a few weeks ago echo was completed. In the ED, patient was noted to be hypertensive, tachycardic, tachypneic with difficulty speaking breathing. CXR showed worsening bilateral infiltrates, pulmonary edema, cardiomegaly. ABG's reflecting acidosis and respiratory failure, proBNP 3157, creatinine 1.4, WBC 18.2. Patient was distressed and consented to intubation, he was emergently intubated in the ED and admitted to CCU. Patient has had a complicated hospitalization and has been closely followed by nephrology, cardiology, and pulmonology. Echo completed on 6/2/2020 showed EF of approximately 15% and grade 1 diastolic dysfunction. His renal function has continued to be poor with current CR 3.3 and his current status makes him a poor candidate for dialysis. He remains sedated and intubated, has not been able to undergo any weaning trials due to his critical status and cardiorenal issues. Palliative care was consulted to assist with discussions regarding goals of care, CODE STATUS, family support.       Past Medical History:        Diagnosis Date    CAD (coronary artery disease) 8/19/2013    10/22/2004  Stent to LAD 12/17/2004  Cath  Patent stent in the LAD, normal LVFX   6/9/2005  Cath  Patent stent in the LAD, normal LVFX pupils and irises normal, external ears and nose are normal,lips normal, ETT in place  Neck: without masses, supple, trachea midline  Lungs: no increased work of breathing, ventilating bilaterally, scattered rhonchi  Heart: regular rate and rhythm and S1, S2 normal, tachycardic  Abdomen: soft, non-tender; bowel sounds normal; no masses,  no organomegaly  Genitourinary: No bladder fullness, masses, or tenderness  Extremities: extremities normal, atraumatic, no cyanosis or edema  Neurologic: sedated  Psychiatric: sedated  Skin: warm, dry    Assessment and Plan: Active Problems:    CHF (congestive heart failure), NYHA class IV, acute on chronic, systolic (HCC)  Resolved Problems:    * No resolved hospital problems. *      Visit Summary: I saw the patient at the bedside with his family present. I attempted to contact the patient spouse via phone and was unable to reach her or leave voicemail. Patient's son, daughter, and sister were at the bedside and I reviewed palliative care services and reason for consult with them. We discussed patient's current status and comorbidities, family appears to have a very good understanding of his prognosis. We discussed difficulty with being able to begin breathing trials due to complications from cardiac and renal issues. Patient's son and daughter Donel Foots have stated that their father would not want to be kept on ventilator and would want him to have comfort care. I discussed options of need for trach/PEG and LTAC placement, which would be difficult in multiple areas due to his cardiac status and ability to tolerate surgical procedures versus hospice care for palliative extubation and comfort care. I have discussed my attempts to reach his spouse and that these are just discussions regarding goals of care so they can help to make informed decisions.   I will continue to meet and discussed the patient's health status, prognosis, and answer questions with the family over the

## 2020-06-08 NOTE — PROGRESS NOTES
Pt spiked temp 101.7; Dr. Joann Jimenez notified. Telephone orders rcv'd for cooling blanket and blood cx x2.

## 2020-06-09 ENCOUNTER — OUTSIDE FACILITY SERVICE (OUTPATIENT)
Dept: PULMONOLOGY | Facility: CLINIC | Age: 65
End: 2020-06-09

## 2020-06-09 PROCEDURE — 99233 SBSQ HOSP IP/OBS HIGH 50: CPT | Performed by: INTERNAL MEDICINE

## 2020-06-09 NOTE — PROGRESS NOTES
the last 72 hours. INR: No results for input(s): INR in the last 72 hours. Objective:   Vitals: BP 94/73   Pulse 78   Temp 99.2 °F (37.3 °C) (Temporal)   Resp 20   Ht 5' 9\" (1.753 m)   Wt 160 lb 3.2 oz (72.7 kg)   SpO2 94%   BMI 23.66 kg/m²   24HR INTAKE/OUTPUT:      Intake/Output Summary (Last 24 hours) at 6/9/2020 0805  Last data filed at 6/9/2020 0600  Gross per 24 hour   Intake 2336.13 ml   Output 1350 ml   Net 986.13 ml     Physical Exam  General appearance: sedated, intubated, no acute distress  HEENT: atraumatic, eyes with clear conjunctiva and normal lids, pupils and irises normal, external ears and nose are normal,lips normal, ETT in place  Neck: without masses, supple, trachea midline  Lungs: no increased work of breathing, ventilating bilaterally, lungs clear throughout  Heart: regular rate and rhythm and S1, S2 normal, tachycardic  Abdomen: soft, non-tender; bowel sounds normal; no masses,  no organomegaly  Genitourinary: No bladder fullness, masses, or tenderness  Extremities: extremities normal, atraumatic, no cyanosis or edema  Neurologic: sedated, responds to painful stimuli  Psychiatric: sedated  Skin: warm, dry      Assessment and Plan: Active Problems:    CHF (congestive heart failure), NYHA class IV, acute on chronic, systolic (HCC)    Acute respiratory failure with hypoxia and hypercapnia (HCC)  Resolved Problems:    * No resolved hospital problems. *    Visit Summary: I saw the patient at the bedside with nursing present. He is resting comfortably in bed, grimaces with painful stimuli and nursing reports he will attempts to follow commands to squeeze hands, very weak, at times. I met with the patient's spouse to discuss the patient's critical status, comorbidities, and goals of care. Patient's spouse appears to have a good understanding of his heart failure and I reviewed the compounding issue of his worsening renal function and current inability to undergo SBT's.  She has stated

## 2020-06-09 NOTE — PROGRESS NOTES
Nutrition Assessment (Enteral Nutrition)    Type and Reason for Visit: Reassess    Nutrition Recommendations: continue current nutritional interventions    Nutrition Assessment: Pt remains on vent and contiues to receive EN-Nepro. Stopping Proteinex d/t increased BUN and creatinine. Propofol 16.5    Malnutrition Assessment:  · Malnutrition Status: At risk for malnutrition  · Context: Acute illness or injury  · Findings of the 6 clinical characteristics of malnutrition (Minimum of 2 out of 6 clinical characteristics is required to make the diagnosis of moderate or severe Protein Calorie Malnutrition based on AND/ASPEN Guidelines):  1. Energy Intake-Greater than 75% of estimated energy requirement, Greater than or equal to 5 days    2. Weight Loss-No significant weight loss, in 1 year  3. Fat Loss-No significant subcutaneous fat loss,    4. Muscle Loss-No significant muscle mass loss,    5. Fluid Accumulation-Mild fluid accumulation, Extremities  6.  Strength-Not measured    Nutrition Risk Level:  Moderate    Nutrition Needs:  · Estimated Daily Total Kcal: 4600-2523 kcals (20-25 kcals/kg)  · Estimated Daily Protein (g): 68-138g  · Estimated Daily Fluid (ml/day): 8561-8303 ml    Nutrition Diagnosis:   · Problem: Inadequate oral intake  · Etiology: related to Acute injury/trauma, Impaired respiratory function-inability to consume food     Signs and symptoms:  as evidenced by NPO status due to medical condition, Intubation, Nutrition support - EN    Objective Information:  · Nutrition-Focused Physical Findings:    · Wound Type: None  · Current Nutrition Therapies:  · Oral Diet Orders: NPO   · Oral Diet intake: NPO  · Oral Nutrition Supplement (ONS) Orders: None  · ONS intake: NPO  · Tube Feeding (TF) Orders:   · Feeding Route: Orogastric  · Formula: Renal  · Rate (ml/hr):31ml    · Volume (ml/day): 744ml  · Duration: Continuous  · Additives/Modulars: None  · Water Flushes: 25ml  · Current TF & Flush Orders

## 2020-06-09 NOTE — PROGRESS NOTES
Hospitalist Progress Note    Patient:  Leatha Aden  YOB: 1955  Date of Service: 6/9/2020  MRN: [id number]   Acct: [de-identified]   Primary Care Physician: Judee Sever, APRN  Advance Directive: DNR-CC  Admit Date: 5/31/2020       Hospital Day: 9  Referring Provider: Phyllis Valverde DO    Patient Seen, Chart, Consults, Notes, Labs, Radiology studies reviewed. Subjective: Leatha Aden is a 71 y.o. male  whom we are following for progressive multisystem organ failure. I had a candid discussion with the patient's spouse yesterday. I explained that he is declining and not making progress. I did introduce the concept of hospice care. I think she has a good understanding of the severity of his illness and that he most likely will not survive this. She would like to allow some of his children to see him before she makes a final decision to withdraw care.     Allergies:  Morphine; Sulfa antibiotics; and Iv contrast [iodides]    Medicines:  Current Facility-Administered Medications   Medication Dose Route Frequency Provider Last Rate Last Dose    dextrose 5 % and 0.2 % NaCl infusion   Intravenous Continuous Jackie Elizalde MD        levoFLOXacin [facility]) 250 MG/50ML infusion 250 mg  250 mg Intravenous Q24H Phyllis Valverde DO 50 mL/hr at 06/09/20 0925 250 mg at 06/09/20 0925    carvedilol (COREG) tablet 6.25 mg  6.25 mg Oral BID  Rubén Johnson MD   6.25 mg at 06/09/20 0731    nitroGLYCERIN 50 mg in dextrose 5% 250 mL infusion  5 mcg/min Intravenous Continuous Phyllis Valverde DO   Stopped at 06/08/20 0100    morphine injection 2 mg  2 mg Intravenous Q4H PRN Tova Myles MD   2 mg at 06/07/20 0006    piperacillin-tazobactam (ZOSYN) 3.375 g in dextrose 5 % 50 mL IVPB extended infusion (mini-bag)  3.375 g Intravenous Q12H Esther Cha MD 12.5 mL/hr at 06/09/20 1053 3.375 g at 06/09/20 1053    dilTIAZem 125 mg in dextrose 5 % 125 mL infusion  5 mg/hr Intravenous Continuous Christal Ode, DO   Stopped at 06/05/20 8691    famotidine (PEPCID) injection 20 mg  20 mg Intravenous Daily Christal Ode, DO   20 mg at 06/08/20 2133    aspirin chewable tablet 81 mg  81 mg Oral Daily Christal Ode, DO   81 mg at 06/09/20 0773    perflutren lipid microspheres (DEFINITY) injection 1.65 mg  1.5 mL Intravenous ONCE PRN Christal Ode, DO        propofol injection  10 mcg/kg/min Intravenous Titrated Christal Ode, DO 17.3 mL/hr at 06/09/20 0753 40 mcg/kg/min at 06/09/20 0753    chlorhexidine (PERIDEX) 0.12 % solution 15 mL  15 mL Mouth/Throat BID Christal Ode, DO   15 mL at 06/09/20 1007       Past Medical History:  Past Medical History:   Diagnosis Date    CAD (coronary artery disease) 8/19/2013    10/22/2004  Stent to LAD 12/17/2004  Cath  Patent stent in the LAD, normal LVFX   6/9/2005  Cath  Patent stent in the LAD, normal LVFX 9/19/2005  cardiolite negative for myocardial ischemia 7/24/2006  Non Q wave MI 7/24/2006  PTCA to diagonal 11/7/2007  CABG x 2 LIMA-LAD, VG-diagonal 11/2/2011  Cath  Patent LIMA-LAD, patent VG-diag, normal LVFX 3/18/2013  lexiscan  Positive for anterior and inferi    Cigarette smoker 8/19/2013    Hypercholesteremia 8/19/2013    Hypertension 8/19/2013       Past Surgical History:  Past Surgical History:   Procedure Laterality Date    CARDIAC CATHETERIZATION  8/19/13  JDT    with stent.  EF 60%    CORONARY ARTERY BYPASS GRAFT  11/7/2007    Dr. Delgado Esquivel History  Family History   Problem Relation Age of Onset    High Blood Pressure Mother     High Blood Pressure Father     Cancer Sister        Social History  Social History     Socioeconomic History    Marital status:      Spouse name: Not on file    Number of children: Not on file    Years of education: Not on file    Highest education level: Not on file   Occupational History    Not on file   Social Needs    Financial resource strain: Not on file    Food insecurity     Worry: Not on file     Inability: Not on file    Transportation needs     Medical: Not on file     Non-medical: Not on file   Tobacco Use    Smoking status: Current Every Day Smoker     Packs/day: 0.50     Years: 14.00     Pack years: 7.00    Smokeless tobacco: Never Used   Substance and Sexual Activity    Alcohol use: Yes     Comment: occas---once/year    Drug use: No    Sexual activity: Not on file   Lifestyle    Physical activity     Days per week: Not on file     Minutes per session: Not on file    Stress: Not on file   Relationships    Social connections     Talks on phone: Not on file     Gets together: Not on file     Attends [employer] service: Not on file     Active member of club or organization: Not on file     Attends meetings of clubs or organizations: Not on file     Relationship status: Not on file    Intimate partner violence     Fear of current or ex partner: Not on file     Emotionally abused: Not on file     Physically abused: Not on file     Forced sexual activity: Not on file   Other Topics Concern    Not on file   Social History Narrative    ** Merged History Encounter **              Review of Systems:    Review of Systems   Unable to perform ROS: Intubated           Objective:  Blood pressure 114/73, pulse 78, temperature 99.2 °F (37.3 °C), temperature source Temporal, resp. rate 22, height 5' 9\" (1.753 m), weight 160 lb 3.2 oz (72.7 kg), SpO2 94 %. Intake/Output Summary (Last 24 hours) at 6/9/2020 1245  Last data filed at 6/9/2020 0731  Gross per 24 hour   Intake 2450.71 ml   Output 1500 ml   Net 950.71 ml       Physical Exam  Vitals signs reviewed. Constitutional:       Appearance: He is well-developed. HENT:      Head: Normocephalic and atraumatic. Eyes:      Conjunctiva/sclera: Conjunctivae normal.      Pupils: Pupils are equal, round, and reactive to light.    Cardiovascular:      Rate and Rhythm: Normal rate and regular rhythm. Heart sounds: Normal heart sounds. Pulmonary:      Effort: Pulmonary effort is normal.      Breath sounds: Rhonchi present. Skin:     General: Skin is warm and dry. Labs:  BMP:   Recent Labs     06/07/20 0312 06/08/20  0257 06/08/20  0427 06/09/20  0150 06/09/20  0434   *  --  154*  --  150*  --    K 3.9   < > 4.0 3.8 3.8 3.6   *  --  117*  --  114*  --    CO2 21*  --  20*  --  20*  --    BUN 90*  --  99*  --  112*  --    CREATININE 3.1*  --  3.3*  --  3.5*  --    CALCIUM 9.1  --  9.3  --  8.9  --     < > = values in this interval not displayed. CBC:   Recent Labs     06/07/20 0312 06/08/20 0257   WBC 18.8* 23.8*   HGB 12.0* 12.0*   HCT 34.8* 35.9*   MCV 85.5 86.9    150     LIVER PROFILE:   Recent Labs     06/07/20 0312   AST 84*   ALT 45*   BILITOT 1.1   ALKPHOS 95     PT/INR: No results for input(s): PROTIME, INR in the last 72 hours. APTT: No results for input(s): APTT in the last 72 hours. BNP:  No results for input(s): BNP in the last 72 hours. Ionized Calcium:No results for input(s): IONCA in the last 72 hours. Magnesium:No results for input(s): MG in the last 72 hours. Phosphorus:No results for input(s): PHOS in the last 72 hours. HgbA1C: No results for input(s): LABA1C in the last 72 hours. Hepatic:   Recent Labs     06/07/20 0312   ALKPHOS 95   ALT 45*   AST 84*   PROT 6.3*   BILITOT 1.1   LABALBU 2.0*     Lactic Acid: No results for input(s): LACTA in the last 72 hours. Troponin: No results for input(s): CKTOTAL, CKMB, TROPONINT in the last 72 hours. ABGs: No results for input(s): PH, PCO2, PO2, HCO3, O2SAT in the last 72 hours. CRP:  No results for input(s): CRP in the last 72 hours. Sed Rate:  No results for input(s): SEDRATE in the last 72 hours. Cultures:   No results for input(s): CULTURE in the last 72 hours. Recent Labs     06/07/20 2051 06/07/20 2052   BC No Growth to date. Any change in status will be called.   --

## 2020-06-09 NOTE — PROGRESS NOTES
Hospitalist Progress Note    Patient:  Lesley Osorio  YOB: 1955  Date of Service: 6/9/2020  MRN: [id number]   Acct: [de-identified]   Primary Care Physician: JANNET Yoon  Advance Directive: DNR-CC  Admit Date: 5/31/2020       Hospital Day: 9  Referring Provider: Rhianna Castellanos DO    Patient Seen, Chart, Consults, Notes, Labs, Radiology studies reviewed. Subjective: Lesley Osorio is a 71 y.o. male  whom we are following for hypoxemic respiratory failure, healthcare associated pneumonia, acute kidney injury, acute on chronic systolic heart failure. No significant improvement overnight. Renal function has plateaued. I discussed the case with nephrology, and he would be a very poor candidate for dialysis therapy given his underlying cardiac status.     Allergies:  Morphine; Sulfa antibiotics; and Iv contrast [iodides]    Medicines:  Current Facility-Administered Medications   Medication Dose Route Frequency Provider Last Rate Last Dose    dextrose 5 % and 0.2 % NaCl infusion   Intravenous Continuous Macy Stokes MD        levoFLOXacin [facility]) 250 MG/50ML infusion 250 mg  250 mg Intravenous Q24H Rhianna Castellanos DO 50 mL/hr at 06/09/20 0925 250 mg at 06/09/20 0925    carvedilol (COREG) tablet 6.25 mg  6.25 mg Oral BID  Loraine Huitron MD   6.25 mg at 06/09/20 0731    nitroGLYCERIN 50 mg in dextrose 5% 250 mL infusion  5 mcg/min Intravenous Continuous Rhianna Castellanos DO   Stopped at 06/08/20 0100    morphine injection 2 mg  2 mg Intravenous Q4H PRN Antonia Vallejo MD   2 mg at 06/07/20 0006    piperacillin-tazobactam (ZOSYN) 3.375 g in dextrose 5 % 50 mL IVPB extended infusion (mini-bag)  3.375 g Intravenous Q12H Justin Canas MD 12.5 mL/hr at 06/09/20 1053 3.375 g at 06/09/20 1053    dilTIAZem 125 mg in dextrose 5 % 125 mL infusion  5 mg/hr Intravenous Continuous Rhianna Castellanos DO   Stopped at 06/05/20 0219    famotidine (PEPCID) injection 20 mg  20 mg Growth to date. Any change in status will be called. No results for input(s): CXSURG in the last 72 hours. Radiology reports as per the Radiologist  Radiology: Xr Chest Portable    Result Date: 6/1/2020  Examination. XR CHEST PORTABLE 6/1/2020 7:00 AM History: Respiratory failure. A single frontal portable semiupright view of the chest is compared with the previous study dated 5/31/2020. There is moderate improvement in the right lung infiltrate since the previous study. The left lung infiltrate persist without any significant change. There is a trace pleural effusion at bilateral bases. There is no pulmonary congestion or pneumothorax. The endotracheal tube is in a stable and optimal positioning. Sternotomy sutures are seen in place without complication. No acute bony abnormality. Moderate improvement in right lung infiltrate. Persistent left lung infiltrate. A trace bibasilar pleural effusion. The above finding are recorded on a digital voice clip in PACS. Signed by Dr Makenna Iverson on 6/1/2020 8:09 AM    Xr Chest Portable    Result Date: 5/31/2020  EXAMINATION:  XR CHEST PORTABLE  5/31/2020 12:25 PM HISTORY: Shortness of breath. COMPARISON: 5/30/2020. FINDINGS:  There is cardiomegaly. The central vessels are indistinct. There is increasing interstitial infiltrate bilaterally. The infiltrate is greater on the right. The patient is now intubated with endotracheal tube tip at the T3-4 level. 1. Worsening infiltrate bilaterally, likely pulmonary edema. Infection less likely. 2. Cardiomegaly. The central vessels are indistinct. There may be a small pleural effusion on the right. Signed by Dr Pushpa Wakefield on 5/31/2020 12:27 PM       Assessment     CHF (congestive heart failure), NYHA class IV, acute on chronic, systolic.  EF 88%. Continue supportive care.     Acute hypoxemic respiratory failure. Hold off on weaning at this point.     Acute kidney injury.   Probable acute tubular

## 2020-06-09 NOTE — PROGRESS NOTES
[address]    Daily HOSPITAL Progress Note                            Date:  6/8/20  Patient: Nikki Amado  Admission:  5/31/2020 12:06 PM  Admit DX: CHF (congestive heart failure), NYHA class IV, acute on chronic, systolic (HCC) [N54.32]  Age:  71 y.o., 1955        Date of Admission 5/31/2020 12:06 PM   Hospital Length of Stay  LOS: 8 days        Date / Time Of Initially Being Seen For This Daily Visit:  6/8/20, at approximately 1500. At that time, I personally saw the patient and rounded with:  Alexia Mosqueda RN    Date / Time That This Note Is Written:  6/8/2020  at  8:19 PM        The observations documented in this note, including the assessment   and plan are mine    Portions of this note have been copied forward, from prior notes, yet modified, to reflect today's clinical status of this patient. Reason for initial evaluation or the patient's initial complaint    1. Reason for Hospital Admission: dyspnea        2. Reason for Cardiology Consultation: Severe cardiomyopathy         SUBJECTIVE:      Chief Complaint / Reason for the Visit   Follow up of:  dyspnea and severe cardiomyopathy7 and systemic arterial hypertension    Family present and in room during examination:  No    At the time of the examination, the patient was:  Remains on the ventilatory      Specialty Problems        Cardiology Problems    ACS (acute coronary syndrome) ([address]        CAD (coronary artery disease)        Hypercholesteremia        CAD (coronary artery disease)        Hypercholesteremia        Hypertension        Atypical chest pain        CHF (congestive heart failure), NYHA class IV, acute on chronic, systolic (HCC)              Current Status Today According to the patient:  Today he is feeling \"0\". Subjective:  Mr. Nikki Amado is generally feeling unchanged.   Remains on the vent, unable to wean at this time    Mr. Nikki Amado has the following cardiac complaints / symptoms today: 1. dyspnea, on vent    2. cardiomyopathy, EF in the 15% range    3.  Hypertension    The blood pressure for the lastr 36 hours has been:  Systolic (18PPC), ASY:063 , Min:85 , QGI:678    Diastolic (41TPB), GTF:17, Min:63, Max:92        Claudio Hardy is a 71 y.o. male with the following history as recorded in [facility]:    Patient Active Problem List    Diagnosis Date Noted    ACS (acute coronary syndrome) ([facility]      Priority: High    Acute respiratory failure with hypoxia and hypercapnia (HCC)      Priority: Low    CHF (congestive heart failure), NYHA class IV, acute on chronic, systolic ([facility] 79/21/1203     Priority: Low    Atypical chest pain 09/22/2018     Priority: Low    CAD (coronary artery disease) 08/19/2013     Priority: Low    Cigarette smoker 08/19/2013     Priority: Low    Hypertension 08/19/2013     Priority: Low    Hypercholesteremia 08/19/2013     Priority: Low    CAD (coronary artery disease) 11/02/2011     Priority: Low    Cigarette smoker 11/02/2011     Priority: Low    Hypercholesteremia 11/02/2011     Priority: Low     Current Facility-Administered Medications   Medication Dose Route Frequency Provider Last Rate Last Dose    levoFLOXacin (LEVAQUIN) 250 MG/50ML infusion 250 mg  250 mg Intravenous Q24H Samule El, DO   Stopped at 06/08/20 1015    carvedilol (COREG) tablet 6.25 mg  6.25 mg Oral BID  Villa Hernandez MD   6.25 mg at 06/08/20 1705    dextrose 5 % solution   Intravenous Continuous Heather Du MD 50 mL/hr at 06/08/20 0805      nitroGLYCERIN 50 mg in dextrose 5% 250 mL infusion  5 mcg/min Intravenous Continuous Samule El, DO   Stopped at 06/08/20 0100    morphine injection 2 mg  2 mg Intravenous Q4H PRN Wm Solis MD   2 mg at 06/07/20 0006    piperacillin-tazobactam (ZOSYN) 3.375 g in dextrose 5 % 50 mL IVPB extended infusion (mini-bag)  3.375 g Intravenous Q12H Junaid Marie MD   Stopped at 06/08/20 1534    dilTIAZem 125 mg in dextrose 5 % 125 mL infusion  5 mg/hr Intravenous Continuous Hall Tayeane, DO   Stopped at 06/05/20 2262    famotidine (PEPCID) injection 20 mg  20 mg Intravenous Daily Hall Obie, DO   20 mg at 06/07/20 2201    aspirin chewable tablet 81 mg  81 mg Oral Daily Hall Tayeane, DO   81 mg at 06/08/20 0915    perflutren lipid microspheres (DEFINITY) injection 1.65 mg  1.5 mL Intravenous ONCE PRN Hall Obie, DO        propofol injection  10 mcg/kg/min Intravenous Titrated Hall Montane, DO 19.5 mL/hr at 06/08/20 1652 45 mcg/kg/min at 06/08/20 1652    chlorhexidine (PERIDEX) 0.12 % solution 15 mL  15 mL Mouth/Throat BID Hall Obie, DO   15 mL at 06/08/20 1018     Allergies: Morphine; Sulfa antibiotics; and Iv contrast [iodides]  Past Medical History:   Diagnosis Date    CAD (coronary artery disease) 8/19/2013    10/22/2004  Stent to LAD 12/17/2004  Cath  Patent stent in the LAD, normal LVFX   6/9/2005  Cath  Patent stent in the LAD, normal LVFX 9/19/2005  cardiolite negative for myocardial ischemia 7/24/2006  Non Q wave MI 7/24/2006  PTCA to diagonal 11/7/2007  CABG x 2 LIMA-LAD, VG-diagonal 11/2/2011  Cath  Patent LIMA-LAD, patent VG-diag, normal LVFX 3/18/2013  lexiscan  Positive for anterior and inferi    Cigarette smoker 8/19/2013    Hypercholesteremia 8/19/2013    Hypertension 8/19/2013     Past Surgical History:   Procedure Laterality Date    CARDIAC CATHETERIZATION  8/19/13  JDT    with stent.  EF 60%    CORONARY ARTERY BYPASS GRAFT  11/7/2007    Dr. Stevens Bence       Family History   Problem Relation Age of Onset    High Blood Pressure Mother     High Blood Pressure Father     Cancer Sister      Social History     Tobacco Use    Smoking status: Current Every Day Smoker     Packs/day: 0.50     Years: 14.00     Pack years: 7.00    Smokeless tobacco: Never Used   Substance Use Topics    Alcohol use: Yes     Comment: occas---once/year Failure          Chronic Acute           Systolic: NSO: 9/95/90  Cath  Patent LIMA-LAD, occluded VG-diag, anterior lateral hypokinesis, EF 45%     Coronary artery disease:  Yes: 11/7/2007  CABG x 2 LIMA-LAD, VG-diag Karli Bors)  Diabetes:  no  Hypertension:  yes  Medications / Illicit Drugs:  No  Valvular heart disease:  No  Miscellaneous causes:  No          6/2/2020  Echo EF 06%, diastolic dysfunction        Coronary artery disease:  Yes: 11/7/2007  CABG x 2 LIMA-LAD, VG-diag Karli Bors)  Diabetes:  no  Hypertension:  yes  Medications / Illicit Drugs:  No  Valvular heart disease:  No  Miscellaneous causes:  No           Diastolic: No Yes: BNP = 3,795     Age:  > 60 years:  UST: 32  Coronary artery disease:  Yes: 11/7/2007  CABG x 2 LIMA-LAD, VG-diag Karli Bors)  Diabetes:  no  Gender:   female:  no   Hypertension:  yes  Obesity:  BMI > 30:  no  Valvular heart disease:  No  Miscellaneous causes:  Yes: chronic kidney disease                With this data, I believe the patient had:  Acute on chronic systolic and acute diastolic congestive heart failure        Summary of hospital course thus far:        Date Clinical Event Plan of Treatment           6/2/2020 Admitted [id number] [id number]  Cardiology Concern:  CHF  Echo and support   6/3/2020 Seen no real change, note not written Continue support   6/4/2020 Remains intubated, looks about the same, EF 15%, seen on 6/4/2020 with Victoria Terry Continue maximal support, cath when extubated    06/05/20  Working on getting him off the vent As per pulmonary    06/06/20  Self extubated and reintubated Continue to diuresis    06/07/20  Looks about the same  will add ace and change to coreg   06/08/20  No real change Family considering options                      CONSIDERATIONS, THOUGHTS, AND PLANS:     4. Continue present medications except for changes as noted above  5. Continue to monitor rhythm  6.  Further orders per clinical course.        Discussed with patient and nursing.     Electronically signed by Garett Ross MD on 06/08/20          [facility]

## 2020-06-09 NOTE — PROGRESS NOTES
(LEVAQUIN) 250 MG/50ML infusion 250 mg  250 mg Intravenous Q24H Joyice Necessary, DO 50 mL/hr at 06/09/20 0925 250 mg at 06/09/20 0925    carvedilol (COREG) tablet 6.25 mg  6.25 mg Oral BID WC Dulce Mcgee MD   6.25 mg at 06/09/20 0731    dextrose 5 % solution   Intravenous Continuous Tosha Louie MD 50 mL/hr at 06/09/20 0730 50 mL/hr at 06/09/20 0730    nitroGLYCERIN 50 mg in dextrose 5% 250 mL infusion  5 mcg/min Intravenous Continuous Joyice Necessary, DO   Stopped at 06/08/20 0100    morphine injection 2 mg  2 mg Intravenous Q4H PRN Mauro Tobar MD   2 mg at 06/07/20 0006    piperacillin-tazobactam (ZOSYN) 3.375 g in dextrose 5 % 50 mL IVPB extended infusion (mini-bag)  3.375 g Intravenous Q12H Matteo Cassidy MD 12.5 mL/hr at 06/09/20 1053 3.375 g at 06/09/20 1053    dilTIAZem 125 mg in dextrose 5 % 125 mL infusion  5 mg/hr Intravenous Continuous Joyice Necessary, DO   Stopped at 06/05/20 0219    famotidine (PEPCID) injection 20 mg  20 mg Intravenous Daily Joyice Necessary, DO   20 mg at 06/08/20 2133    aspirin chewable tablet 81 mg  81 mg Oral Daily Joyice Necessary, DO   81 mg at 06/09/20 5369    perflutren lipid microspheres (DEFINITY) injection 1.65 mg  1.5 mL Intravenous ONCE PRN Joyice Necessary, DO        propofol injection  10 mcg/kg/min Intravenous Titrated Joyice Necessary, DO 17.3 mL/hr at 06/09/20 0753 40 mcg/kg/min at 06/09/20 0753    chlorhexidine (PERIDEX) 0.12 % solution 15 mL  15 mL Mouth/Throat BID Joyice Necessary, DO   15 mL at 06/09/20 8134       Past Medical History:  Past Medical History:   Diagnosis Date    CAD (coronary artery disease) 8/19/2013    10/22/2004  Stent to LAD 12/17/2004  Cath  Patent stent in the LAD, normal LVFX   6/9/2005  Cath  Patent stent in the LAD, normal LVFX 9/19/2005  cardiolite negative for myocardial ischemia 7/24/2006  Non Q wave MI 7/24/2006  PTCA to diagonal 11/7/2007  CABG x 2 LIMA-LAD, VG-diagonal 11/2/2011  Cath Patent LIMA-LAD, patent VG-diag, normal LVFX 3/18/2013  lexiscan  Positive for anterior and inferi    Cigarette smoker 8/19/2013    Hypercholesteremia 8/19/2013    Hypertension 8/19/2013       Past Surgical History:  Past Surgical History:   Procedure Laterality Date    CARDIAC CATHETERIZATION  8/19/13  JDT    with stent.  EF 60%    CORONARY ARTERY BYPASS GRAFT  11/7/2007    Dr. Sean Machado History  Family History   Problem Relation Age of Onset    High Blood Pressure Mother     High Blood Pressure Father     Cancer Sister        Social History  Social History     Socioeconomic History    Marital status:      Spouse name: Not on file    Number of children: Not on file    Years of education: Not on file    Highest education level: Not on file   Occupational History    Not on file   Social Needs    Financial resource strain: Not on file    Food insecurity     Worry: Not on file     Inability: Not on file    Transportation needs     Medical: Not on file     Non-medical: Not on file   Tobacco Use    Smoking status: Current Every Day Smoker     Packs/day: 0.50     Years: 14.00     Pack years: 7.00    Smokeless tobacco: Never Used   Substance and Sexual Activity    Alcohol use: Yes     Comment: occas---once/year    Drug use: No    Sexual activity: Not on file   Lifestyle    Physical activity     Days per week: Not on file     Minutes per session: Not on file    Stress: Not on file   Relationships    Social connections     Talks on phone: Not on file     Gets together: Not on file     Attends Cheondoism service: Not on file     Active member of club or organization: Not on file     Attends meetings of clubs or organizations: Not on file     Relationship status: Not on file    Intimate partner violence     Fear of current or ex partner: Not on file     Emotionally abused: Not on file     Physically abused: Not on file     Forced sexual activity: Not on file lexiscan  Positive for anterior and inferi    Cigarette smoker 8/19/2013    Hypercholesteremia 8/19/2013    Hypertension 8/19/2013       Past Surgical History:  Past Surgical History:   Procedure Laterality Date    CARDIAC CATHETERIZATION  8/19/13  JDT    with stent.  EF 60%    CORONARY ARTERY BYPASS GRAFT  11/7/2007    Dr. Baltazar Madera History  Family History   Problem Relation Age of Onset    High Blood Pressure Mother     High Blood Pressure Father     Cancer Sister        Social History  Social History     Socioeconomic History    Marital status:      Spouse name: Not on file    Number of children: Not on file    Years of education: Not on file    Highest education level: Not on file   Occupational History    Not on file   Social Needs    Financial resource strain: Not on file    Food insecurity     Worry: Not on file     Inability: Not on file    Transportation needs     Medical: Not on file     Non-medical: Not on file   Tobacco Use    Smoking status: Current Every Day Smoker     Packs/day: 0.50     Years: 14.00     Pack years: 7.00    Smokeless tobacco: Never Used   Substance and Sexual Activity    Alcohol use: Yes     Comment: occas---once/year    Drug use: No    Sexual activity: Not on file   Lifestyle    Physical activity     Days per week: Not on file     Minutes per session: Not on file    Stress: Not on file   Relationships    Social connections     Talks on phone: Not on file     Gets together: Not on file     Attends Bahai service: Not on file     Active member of club or organization: Not on file     Attends meetings of clubs or organizations: Not on file     Relationship status: Not on file    Intimate partner violence     Fear of current or ex partner: Not on file     Emotionally abused: Not on file     Physically abused: Not on file     Forced sexual activity: Not on file   Other Topics Concern    Not on file   Social History PM       Assessment   1. Acute kidney injury- cardiorenal syndrome  2. Congestive heart failure exacerbation  3. History of coronary artery disease. 4.  Benign essential hypertension. 5.  Hypercholesteremia. .  6.  Left lung infiltrate/pneumonia  7. Hypernatremia    Plan:  1. Will start IV fluids for now since Chest xray appears imrpoving  2. Hold diuretics  3. IV antibiotics  4. Continue D5 soultions  5. Very poor prognosis. Suggest comfort care/ hospice  6. Patient would be a very poor candidate for dialysis.        Heather Du MD  06/09/20  11:38 AM

## 2020-06-10 ENCOUNTER — OUTSIDE FACILITY SERVICE (OUTPATIENT)
Dept: PULMONOLOGY | Facility: CLINIC | Age: 65
End: 2020-06-10

## 2020-06-10 PROCEDURE — 99233 SBSQ HOSP IP/OBS HIGH 50: CPT | Performed by: INTERNAL MEDICINE

## 2020-06-10 NOTE — PROGRESS NOTES
Pt remains on ventilator. Per night shift report increased secretions after midnight. On my assessment rhonchi noted upper airways with mild insp crackles or referred sound to lower lobes bilaterally. Sats 94% on 50% FIO2 per vent. Copious clear thick oral secretions. ET secretions moderate white thick at first then thinner. After suctioning, lungs sound more clear, diminished LLL. HR tachy at 101 ST with occasional PVC noted, will give Coreg AM dose. /90. Trace edema upper ext bilaterally, no lower edema noted to legs. Aparicio with clear yellow urine. D5 2% NS at 100/hr infusing, Propofol at 40mcg/kg/min will decrease to 35 mcg/kg/min. RASS -3. Pt not following commands, but responds to pain stimuli in all ext. Pt frowns and withdraws to pain. Not following commands.  Electronically signed by Kimberlee Boss RN on 6/10/2020 at 9:52 AM

## 2020-06-10 NOTE — PROGRESS NOTES
Pt lungs with rhonchi noted upper airways. Pt suctioned and with first pass some blood tinged secretions noted with tan secretions. With 2nd pass only tan secretions returned, sticky secretions noted. Scant clear oral secretions returned. Vent remains at 40% , pt RR 22-24, Sats 95% - 96%. Oral care completed. Lung with insp and exp wheezes noted RUL and RML. Diminished bilateral lower lobes. SHABNAM and LLL clear. Aparicio with clear dark yellow urine. Pt remains sedated, but opens eyes to pain and when repositioned. Pt still not following commands to squeeze hands. Propofol at 30mcg/kg/min at present. Pt frowns to position changes, suctioning and oral care.  Electronically signed by Mercy Hernández RN on 6/10/2020 at 4:59 PM

## 2020-06-10 NOTE — PROGRESS NOTES
today: 1. dyspnea, on vent    2. Non ischemic cardiomyopathy, ? Clinical course    3.  Hypertension    The blood pressure for the lastr 36 hours has been:  Systolic (30BHH), IPI:753 , Min:91 , LGF:325    Diastolic (65BAK), CKV:56, Min:52, Max:87        Dominguez Hernandez is a 71 y.o. male with the following history as recorded in [facility]:    Patient Active Problem List    Diagnosis Date Noted    [facility] syndrome) ([facility]      Priority: High    Acute respiratory failure with hypoxia and hypercapnia (HCC)      Priority: Low    CHF (congestive heart failure), NYHA class IV, acute on chronic, systolic ([facility] 13/43/8132     Priority: Low    Atypical chest pain 09/22/2018     Priority: Low    CAD (coronary artery disease) 08/19/2013     Priority: Low    Cigarette smoker 08/19/2013     Priority: Low    Hypertension 08/19/2013     Priority: Low    Hypercholesteremia 08/19/2013     Priority: Low    CAD (coronary artery disease) 11/02/2011     Priority: Low    Cigarette smoker 11/02/2011     Priority: Low    Hypercholesteremia 11/02/2011     Priority: Low     Current Facility-Administered Medications   Medication Dose Route Frequency Provider Last Rate Last Dose    dextrose 5 % and 0.2 % NaCl infusion   Intravenous Continuous Alexandria Perry  mL/hr at 06/09/20 1304      levoFLOXacin (LEVAQUIN) 250 MG/50ML infusion 250 mg  250 mg Intravenous Q24H Lindo [place] DO   Stopped at 06/09/20 1025    carvedilol (COREG) tablet 6.25 mg  6.25 mg Oral BID  Danish Rivero MD   6.25 mg at 06/09/20 1834    nitroGLYCERIN 50 mg in dextrose 5% 250 mL infusion  5 mcg/min Intravenous Continuous Lindo Thanh DO   Stopped at 06/08/20 0100    morphine injection 2 mg  2 mg Intravenous Q4H PRN Dunia Yousif MD   2 mg at 06/07/20 0006    piperacillin-tazobactam (ZOSYN) 3.375 g in dextrose 5 % 50 mL IVPB extended infusion (mini-bag)  3.375 g Intravenous Q12H Lizy Morales MD   Stopped at 06/09/20 1535 MEDICAL DECISION MAKING                   Cardiac Specific Problem / Diagnosis   Discussion and Data Reviewed Diagnostic or Therapeutic Procedures Ordered Management Options Selected                               1. Presenting problem / symptom     Progressive dyspnea  are worsening    remains intubated Yes: echo with an EF of 42% and diastolic dysfunction Continue current medications:     N/A                        2. CAD Initial presentation during this evaluation    Review and summation of old records:     10/22/2004  Cath  Stent to LAD, normal LVFX  12/17/2004  Cath  Patent stent in the LAD, normal LVFX  6/9/2005  Cath  Patent stent in the LAD, normal LVFX  9/19/2005  cardiolite negative for myocardial ischemia, EF 40%  7/24/2006  Non Q wave MI  7/24/2006  Cath  ptca to the diag  11/6/2007  Cath  instent restenosis of the LAD stent, normal LVFX  11/7/2007  CABG x 2 LIMA-LAD, VG-diag Glennda English)  11/2/2011  Cath  Patent LIMA-LAD, patent VG-diag, normal LVFX  3/19/2013  lexiscan Positive for anterior and inferior myocardial ischemia, EF 52%, 6% ischemic myocardium on stress, intermediate risk findings, AUC indication 16, AUC score 7  8/19/2013  Cath  Occluded LIMA-LAD, 2.0x8 BMS to VG-D1, normal LVFX   9/22/2018  ACS SARAH RISK Score 4 (angina, cad> 50, hypertension, hypercholesteremia, cigarette use, asa), AUC indication 3, AUC score 8   9/24/18  Cath  Patent LIMA-LAD, occluded VG-diag, anterior lateral hypokinesis, EF 45%  6/2/2020  Echo  EF 15%, DD       Yes: echo Continue current medications:     Yes: as per renal                 3.  Concern regarding systemic blood pressure Initial presentation during this evaluation The blood pressure for the lastr 36 hours has been:  Systolic (06JFZ), XPR:921 , Min:91 , FPB:418    Diastolic (17UOT), DNE:84, Min:52, Max:87          No Continue current medications:        yes                       4.   (Note to Coding and for per clinical course.        Discussed with patient and nursing.     Electronically signed by Susy Cuevas MD on 06/09/20             [facility]

## 2020-06-10 NOTE — PROGRESS NOTES
Hospitalist Progress Note    Patient:  Sean Cristobal  YOB: 1955  Date of Service: 6/10/2020  MRN: [id number]   Acct: [de-identified]   Primary Care Physician: JANNET Mendoza  Advance Directive: DNR-CC  Admit Date: 5/31/2020       Hospital Day: 10  Referring Provider: Selwyn Necessary, DO    Patient Seen, Chart, Consults, Notes, Labs, Radiology studies reviewed. Subjective: Sean Cristobal is a 71 y.o. male  whom we are following for progressive multisystem organ failure. No new events overnight. He still is requiring significant support. His wife would like to continue with aggressive care for at least another 48 hours.     Allergies:  Morphine; Sulfa antibiotics; and Iv contrast [iodides]    Medicines:  Current Facility-Administered Medications   Medication Dose Route Frequency Provider Last Rate Last Dose    guaiFENesin (ROBITUSSIN) 100 MG/5ML liquid 400 mg  400 mg Per NG tube 4x daily Jacob Cage MD        ipratropium (ATROVENT) 0.02 % nebulizer solution 0.5 mg  0.5 mg Nebulization 4x daily Jacob Cage MD        dextrose 5 % and 0.2 % NaCl infusion   Intravenous Continuous Tosha Louie  mL/hr at 06/10/20 0920      levoFLOXacin (LEVAQUIN) 250 MG/50ML infusion 250 mg  250 mg Intravenous Q24H Joyice Necessary, DO 50 mL/hr at 06/10/20 0844 250 mg at 06/10/20 0844    carvedilol (COREG) tablet 6.25 mg  6.25 mg Oral BID WC Dulce Mcgee MD   6.25 mg at 06/10/20 0731    nitroGLYCERIN 50 mg in dextrose 5% 250 mL infusion  5 mcg/min Intravenous Continuous Joyice Necessary, DO   Stopped at 06/08/20 0100    morphine injection 2 mg  2 mg Intravenous Q4H PRN Mauro Tobar MD   2 mg at 06/07/20 0006    piperacillin-tazobactam (ZOSYN) 3.375 g in dextrose 5 % 50 mL IVPB extended infusion (mini-bag)  3.375 g Intravenous Q12H Matteo Cassidy MD 12.5 mL/hr at 06/10/20 0957 3.375 g at 06/10/20 0957    dilTIAZem 125 mg in dextrose 5 % 125 mL infusion  5 mg/hr Financial resource strain: Not on file    Food insecurity     Worry: Not on file     Inability: Not on file    Transportation needs     Medical: Not on file     Non-medical: Not on file   Tobacco Use    Smoking status: Current Every Day Smoker     Packs/day: 0.50     Years: 14.00     Pack years: 7.00    Smokeless tobacco: Never Used   Substance and Sexual Activity    Alcohol use: Yes     Comment: occas---once/year    Drug use: No    Sexual activity: Not on file   Lifestyle    Physical activity     Days per week: Not on file     Minutes per session: Not on file    Stress: Not on file   Relationships    Social connections     Talks on phone: Not on file     Gets together: Not on file     Attends Congregation service: Not on file     Active member of club or organization: Not on file     Attends meetings of clubs or organizations: Not on file     Relationship status: Not on file    Intimate partner violence     Fear of current or ex partner: Not on file     Emotionally abused: Not on file     Physically abused: Not on file     Forced sexual activity: Not on file   Other Topics Concern    Not on file   Social History Narrative    ** Merged History Encounter **              Review of Systems:    Review of Systems   Unable to perform ROS: Intubated           Objective:  Blood pressure 121/78, pulse 85, temperature 99.7 °F (37.6 °C), temperature source Temporal, resp. rate (!) 31, height 5' 9\" (1.753 m), weight 158 lb 1.6 oz (71.7 kg), SpO2 96 %. Intake/Output Summary (Last 24 hours) at 6/10/2020 1234  Last data filed at 6/10/2020 0800  Gross per 24 hour   Intake 4145.57 ml   Output 2475 ml   Net 1670.57 ml       Physical Exam  Vitals signs reviewed. Constitutional:       Appearance: He is well-developed. HENT:      Head: Normocephalic and atraumatic. Eyes:      Conjunctiva/sclera: Conjunctivae normal.   Cardiovascular:      Rate and Rhythm: Normal rate and regular rhythm.       Heart sounds: Normal heart injury. Probable acute tubular necrosis. Plateaued. Nonoliguric. Continue to monitor     Hospital-acquired pneumonia. Continue antibiotics.     Hypernatremia. Increase free water. He remains critically ill.   Family considering hospice       Rafael Ragland DO

## 2020-06-10 NOTE — PROGRESS NOTES
D5 .2% NS decreased to 75ml/hr . Dr Alcides Hutson in room and has stated will order decrease in fluids.  Electronically signed by Ariadne Lam RN on 6/10/2020 at 9:55 AM

## 2020-06-10 NOTE — PROGRESS NOTES
Intravenous Continuous Cinthia Calvillo  mL/hr at 06/10/20 0920      levoFLOXacin (LEVAQUIN) 250 MG/50ML infusion 250 mg  250 mg Intravenous Q24H Marlane Retort, DO 50 mL/hr at 06/10/20 0844 250 mg at 06/10/20 0844    carvedilol (COREG) tablet 6.25 mg  6.25 mg Oral BID WC Carlos Enrique Geiger MD   6.25 mg at 06/10/20 0731    nitroGLYCERIN 50 mg in dextrose 5% 250 mL infusion  5 mcg/min Intravenous Continuous Marlane Retort, DO   Stopped at 06/08/20 0100    morphine injection 2 mg  2 mg Intravenous Q4H PRN Marjan Mccray MD   2 mg at 06/07/20 0006    piperacillin-tazobactam (ZOSYN) 3.375 g in dextrose 5 % 50 mL IVPB extended infusion (mini-bag)  3.375 g Intravenous Q12H Jannet Hannon MD 12.5 mL/hr at 06/10/20 0957 3.375 g at 06/10/20 0957    dilTIAZem 125 mg in dextrose 5 % 125 mL infusion  5 mg/hr Intravenous Continuous Marlane Retort, DO   Stopped at 06/05/20 0219    famotidine (PEPCID) injection 20 mg  20 mg Intravenous Daily Marlane Retort, DO   20 mg at 06/09/20 2018    aspirin chewable tablet 81 mg  81 mg Oral Daily Marlane Retort, DO   81 mg at 06/10/20 2977    perflutren lipid microspheres (DEFINITY) injection 1.65 mg  1.5 mL Intravenous ONCE PRN Marlane Retort, DO        propofol injection  10 mcg/kg/min Intravenous Titrated Marlane Retort, DO 13 mL/hr at 06/10/20 0935 30 mcg/kg/min at 06/10/20 0935    chlorhexidine (PERIDEX) 0.12 % solution 15 mL  15 mL Mouth/Throat BID Marlane Retort, DO   15 mL at 06/10/20 3909       Past Medical History:  Past Medical History:   Diagnosis Date    CAD (coronary artery disease) 8/19/2013    10/22/2004  Stent to LAD 12/17/2004  Cath  Patent stent in the LAD, normal LVFX   6/9/2005  Cath  Patent stent in the LAD, normal LVFX 9/19/2005  cardiolite negative for myocardial ischemia 7/24/2006  Non Q wave MI 7/24/2006  PTCA to diagonal 11/7/2007  CABG x 2 LIMA-LAD, VG-diagonal 11/2/2011  Cath  Patent LIMA-LAD, patent VG-diag, Social History Narrative    ** Merged History Encounter **              Review of Systems:  Review of system is not possible as he is intubated and sedated.   Objective:  Patient Vitals for the past 24 hrs:   BP Temp Temp src Pulse Resp SpO2 Weight   06/10/20 0930 121/78 -- -- 85 (!) 31 96 % --   06/10/20 0900 113/75 -- -- 81 19 95 % --   06/10/20 0830 121/71 -- -- 90 25 94 % --   06/10/20 0800 120/85 -- -- 93 26 93 % --   06/10/20 0745 -- -- -- 99 28 93 % --   06/10/20 0731 (!) 149/90 -- -- 101 -- -- --   06/10/20 0730 (!) 149/90 99.7 °F (37.6 °C) Temporal 101 26 92 % --   06/10/20 0715 -- -- -- 99 22 93 % --   06/10/20 0644 -- -- -- 93 25 (!) 88 % --   06/10/20 0600 127/69 -- -- 89 24 95 % 158 lb 1.6 oz (71.7 kg)   06/10/20 0500 (!) 114/56 -- -- 94 27 94 % --   06/10/20 0400 139/82 98.5 °F (36.9 °C) Temporal 102 28 92 % --   06/10/20 0300 106/77 -- -- 87 23 94 % --   06/10/20 0200 (!) 147/75 -- -- 89 20 94 % --   06/10/20 0100 119/64 -- -- 80 25 95 % --   06/10/20 0000 111/67 97.9 °F (36.6 °C) Temporal 79 21 96 % --   06/09/20 2300 124/66 -- -- 81 23 96 % --   06/09/20 2202 -- -- -- 78 22 96 % --   06/09/20 2200 123/62 -- -- 74 24 96 % --   06/09/20 2100 115/64 -- -- 73 23 97 % --   06/09/20 2000 120/70 98 °F (36.7 °C) Temporal 79 25 95 % --   06/09/20 1923 -- -- -- 80 27 95 % --   06/09/20 1900 120/68 -- -- 80 26 95 % --   06/09/20 1834 130/67 -- -- 80 -- -- --   06/09/20 1817 -- -- -- 77 24 96 % --   06/09/20 1815 -- -- -- 79 26 96 % --   06/09/20 1800 122/72 -- -- 76 23 96 % --   06/09/20 1730 110/62 -- -- 77 25 95 % --   06/09/20 1700 112/67 -- -- 73 25 96 % --   06/09/20 1630 116/68 -- -- 72 22 96 % --   06/09/20 1600 108/65 -- -- 76 23 94 % --   06/09/20 1530 97/63 99 °F (37.2 °C) Temporal 79 25 93 % --   06/09/20 1500 109/65 -- -- 80 26 92 % --   06/09/20 1430 126/85 -- -- 85 23 96 % --   06/09/20 1409 -- -- -- 80 27 95 % --   06/09/20 1400 113/69 -- -- 79 26 95 % --   06/09/20 1330 116/73 -- -- 81 28 95 % --   06/09/20 1300 98/68 -- -- 78 20 94 % --   06/09/20 1230 105/70 96.9 °F (36.1 °C) Temporal 79 22 94 % --   06/09/20 1200 97/67 -- -- 78 21 95 % --   06/09/20 1130 94/68 -- -- 78 22 94 % --   06/09/20 1100 114/73 -- -- 78 22 94 % --       Intake/Output Summary (Last 24 hours) at 6/10/2020 1033  Last data filed at 6/10/2020 0800  Gross per 24 hour   Intake 4180.17 ml   Output 2475 ml   Net 1705.17 ml     General: Intubated/sedated. HEENT: Normocephalic atraumatic head/orotracheal intubation. Neck: Supple with no JVD or carotid bruits. Chest: scattered fine rales  CVS: regular rate and rhythm  Abdominal: soft, nontender, normal bowel sounds  Extremities: no cyanosis but trace leg edema  Skin: warm and dry without rash      Labs:  BMP:   Recent Labs     06/08/20  0257  06/09/20  0150 06/09/20  0434 06/10/20  0429 06/10/20  0659   *  --  150*  --   --  146*   K 4.0   < > 3.8 3.6 3.4 4.8   *  --  114*  --   --  110   CO2 20*  --  20*  --   --  18*   BUN 99*  --  112*  --   --  106*   CREATININE 3.3*  --  3.5*  --   --  3.3*   CALCIUM 9.3  --  8.9  --   --  8.4*    < > = values in this interval not displayed. CBC:   Recent Labs     06/08/20 0257   WBC 23.8*   HGB 12.0*   HCT 35.9*   MCV 86.9        LIVER PROFILE:   No results for input(s): AST, ALT, LIPASE, BILIDIR, BILITOT, ALKPHOS in the last 72 hours. Invalid input(s): AMYLASE,  ALB  PT/INR:   No results for input(s): PROTIME, INR in the last 72 hours. APTT:   No results for input(s): APTT in the last 72 hours. BNP:  No results for input(s): BNP in the last 72 hours. Ionized Calcium:No results for input(s): IONCA in the last 72 hours. Magnesium:  No results for input(s): MG in the last 72 hours. Phosphorus:No results for input(s): PHOS in the last 72 hours. HgbA1C: No results for input(s): LABA1C in the last 72 hours.   Hepatic:   No results for input(s): ALKPHOS, ALT, AST, PROT, BILITOT, BILIDIR, LABALBU in the last 72 function.     Allergies:  Morphine; Sulfa antibiotics; and Iv contrast [iodides]    Medicines:  Current Facility-Administered Medications   Medication Dose Route Frequency Provider Last Rate Last Dose    dextrose 5 % and 0.2 % NaCl infusion   Intravenous Continuous Tosha Louie  mL/hr at 06/10/20 0920      levoFLOXacin (LEVAQUIN) 250 MG/50ML infusion 250 mg  250 mg Intravenous Q24H Joyice Necessary, DO 50 mL/hr at 06/10/20 0844 250 mg at 06/10/20 0844    carvedilol (COREG) tablet 6.25 mg  6.25 mg Oral BID WC Dulce Mcgee MD   6.25 mg at 06/10/20 0731    nitroGLYCERIN 50 mg in dextrose 5% 250 mL infusion  5 mcg/min Intravenous Continuous Joyice Necessary, DO   Stopped at 06/08/20 0100    morphine injection 2 mg  2 mg Intravenous Q4H PRN Mauro Tobar MD   2 mg at 06/07/20 0006    piperacillin-tazobactam (ZOSYN) 3.375 g in dextrose 5 % 50 mL IVPB extended infusion (mini-bag)  3.375 g Intravenous Q12H Matteo Cassidy MD 12.5 mL/hr at 06/10/20 0957 3.375 g at 06/10/20 0957    dilTIAZem 125 mg in dextrose 5 % 125 mL infusion  5 mg/hr Intravenous Continuous Joyice Necessary, DO   Stopped at 06/05/20 0219    famotidine (PEPCID) injection 20 mg  20 mg Intravenous Daily Joyice Necessary, DO   20 mg at 06/09/20 2018    aspirin chewable tablet 81 mg  81 mg Oral Daily Joyice Necessary, DO   81 mg at 06/10/20 9588    perflutren lipid microspheres (DEFINITY) injection 1.65 mg  1.5 mL Intravenous ONCE PRN Joyice Necessary, DO        propofol injection  10 mcg/kg/min Intravenous Titrated Joyice Necessary, DO 13 mL/hr at 06/10/20 0935 30 mcg/kg/min at 06/10/20 0935    chlorhexidine (PERIDEX) 0.12 % solution 15 mL  15 mL Mouth/Throat BID Joyice Necessary, DO   15 mL at 06/10/20 8723       Past Medical History:  Past Medical History:   Diagnosis Date    CAD (coronary artery disease) 8/19/2013    10/22/2004  Stent to LAD 12/17/2004  Cath  Patent stent in the LAD, normal LVFX   6/9/2005 status: Not on file    Intimate partner violence     Fear of current or ex partner: Not on file     Emotionally abused: Not on file     Physically abused: Not on file     Forced sexual activity: Not on file   Other Topics Concern    Not on file   Social History Narrative    ** Merged History Encounter **              Review of Systems:  Review of system is not possible as he is intubated and sedated.   Objective:  Patient Vitals for the past 24 hrs:   BP Temp Temp src Pulse Resp SpO2 Weight   06/10/20 0930 121/78 -- -- 85 (!) 31 96 % --   06/10/20 0900 113/75 -- -- 81 19 95 % --   06/10/20 0830 121/71 -- -- 90 25 94 % --   06/10/20 0800 120/85 -- -- 93 26 93 % --   06/10/20 0745 -- -- -- 99 28 93 % --   06/10/20 0731 (!) 149/90 -- -- 101 -- -- --   06/10/20 0730 (!) 149/90 99.7 °F (37.6 °C) Temporal 101 26 92 % --   06/10/20 0715 -- -- -- 99 22 93 % --   06/10/20 0644 -- -- -- 93 25 (!) 88 % --   06/10/20 0600 127/69 -- -- 89 24 95 % 158 lb 1.6 oz (71.7 kg)   06/10/20 0500 (!) 114/56 -- -- 94 27 94 % --   06/10/20 0400 139/82 98.5 °F (36.9 °C) Temporal 102 28 92 % --   06/10/20 0300 106/77 -- -- 87 23 94 % --   06/10/20 0200 (!) 147/75 -- -- 89 20 94 % --   06/10/20 0100 119/64 -- -- 80 25 95 % --   06/10/20 0000 111/67 97.9 °F (36.6 °C) Temporal 79 21 96 % --   06/09/20 2300 124/66 -- -- 81 23 96 % --   06/09/20 2202 -- -- -- 78 22 96 % --   06/09/20 2200 123/62 -- -- 74 24 96 % --   06/09/20 2100 115/64 -- -- 73 23 97 % --   06/09/20 2000 120/70 98 °F (36.7 °C) Temporal 79 25 95 % --   06/09/20 1923 -- -- -- 80 27 95 % --   06/09/20 1900 120/68 -- -- 80 26 95 % --   06/09/20 1834 130/67 -- -- 80 -- -- --   06/09/20 1817 -- -- -- 77 24 96 % --   06/09/20 1815 -- -- -- 79 26 96 % --   06/09/20 1800 122/72 -- -- 76 23 96 % --   06/09/20 1730 110/62 -- -- 77 25 95 % --   06/09/20 1700 112/67 -- -- 73 25 96 % --   06/09/20 1630 116/68 -- -- 72 22 96 % --   06/09/20 1600 108/65 -- -- 76 23 94 % --   06/09/20 1530 97/63 99 °F (37.2 °C) Temporal 79 25 93 % --   06/09/20 1500 109/65 -- -- 80 26 92 % --   06/09/20 1430 126/85 -- -- 85 23 96 % --   06/09/20 1409 -- -- -- 80 27 95 % --   06/09/20 1400 113/69 -- -- 79 26 95 % --   06/09/20 1330 116/73 -- -- 81 28 95 % --   06/09/20 1300 98/68 -- -- 78 20 94 % --   06/09/20 1230 105/70 96.9 °F (36.1 °C) Temporal 79 22 94 % --   06/09/20 1200 97/67 -- -- 78 21 95 % --   06/09/20 1130 94/68 -- -- 78 22 94 % --   06/09/20 1100 114/73 -- -- 78 22 94 % --       Intake/Output Summary (Last 24 hours) at 6/10/2020 1033  Last data filed at 6/10/2020 0800  Gross per 24 hour   Intake 4180.17 ml   Output 2475 ml   Net 1705.17 ml     General: Intubated/sedated. HEENT: Normocephalic atraumatic head/orotracheal intubation. Neck: Supple with no JVD or carotid bruits. Chest:  clear to auscultation bilaterally without respiratory distress  CVS: regular rate and rhythm  Abdominal: soft, nontender, normal bowel sounds  Extremities: no cyanosis or edema  Skin: warm and dry without rash      Labs:  BMP:   Recent Labs     06/08/20  0257  06/09/20  0150 06/09/20  0434 06/10/20  0429 06/10/20  0659   *  --  150*  --   --  146*   K 4.0   < > 3.8 3.6 3.4 4.8   *  --  114*  --   --  110   CO2 20*  --  20*  --   --  18*   BUN 99*  --  112*  --   --  106*   CREATININE 3.3*  --  3.5*  --   --  3.3*   CALCIUM 9.3  --  8.9  --   --  8.4*    < > = values in this interval not displayed. CBC:   Recent Labs     06/08/20  0257   WBC 23.8*   HGB 12.0*   HCT 35.9*   MCV 86.9        LIVER PROFILE:   No results for input(s): AST, ALT, LIPASE, BILIDIR, BILITOT, ALKPHOS in the last 72 hours. Invalid input(s): AMYLASE,  ALB  PT/INR:   No results for input(s): PROTIME, INR in the last 72 hours. APTT:   No results for input(s): APTT in the last 72 hours. BNP:  No results for input(s): BNP in the last 72 hours. Ionized Calcium:No results for input(s): IONCA in the last 72 hours.   Magnesium:  No results for input(s): MG in the last 72 hours. Phosphorus:No results for input(s): PHOS in the last 72 hours. HgbA1C: No results for input(s): LABA1C in the last 72 hours. Hepatic:   No results for input(s): ALKPHOS, ALT, AST, PROT, BILITOT, BILIDIR, LABALBU in the last 72 hours. Lactic Acid:   No results for input(s): LACTA in the last 72 hours. Troponin: No results for input(s): CKTOTAL, CKMB, TROPONINT in the last 72 hours. ABGs: No results for input(s): PH, PCO2, PO2, HCO3, O2SAT in the last 72 hours. CRP:  No results for input(s): CRP in the last 72 hours. Sed Rate:  No results for input(s): SEDRATE in the last 72 hours. Cultures:   No results for input(s): CULTURE in the last 72 hours. Recent Labs     06/07/20 2051 06/07/20 2052   BC No Growth to date. Any change in status will be called. --    BLOODCULT2  --  No Growth to date. Any change in status will be called. No results for input(s): CXSURG in the last 72 hours. Radiology reports as per the Radiologist  Radiology: Xr Chest Portable    Result Date: 6/1/2020  Examination. XR CHEST PORTABLE 6/1/2020 7:00 AM History: Respiratory failure. A single frontal portable semiupright view of the chest is compared with the previous study dated 5/31/2020. There is moderate improvement in the right lung infiltrate since the previous study. The left lung infiltrate persist without any significant change. There is a trace pleural effusion at bilateral bases. There is no pulmonary congestion or pneumothorax. The endotracheal tube is in a stable and optimal positioning. Sternotomy sutures are seen in place without complication. No acute bony abnormality. Moderate improvement in right lung infiltrate. Persistent left lung infiltrate. A trace bibasilar pleural effusion. The above finding are recorded on a digital voice clip in PACS.  Signed by Dr Damon Goddard on 6/1/2020 8:09 AM    Xr Chest Portable    Result Date: 5/31/2020  EXAMINATION:  XR CHEST PORTABLE  5/31/2020 12:25 PM HISTORY: Shortness of breath. COMPARISON: 5/30/2020. FINDINGS:  There is cardiomegaly. The central vessels are indistinct. There is increasing interstitial infiltrate bilaterally. The infiltrate is greater on the right. The patient is now intubated with endotracheal tube tip at the T3-4 level. 1. Worsening infiltrate bilaterally, likely pulmonary edema. Infection less likely. 2. Cardiomegaly. The central vessels are indistinct. There may be a small pleural effusion on the right. Signed by Dr Abdulaziz Hayward on 5/31/2020 12:27 PM       Assessment   1. Acute kidney injury- cardiorenal syndrome  2. Congestive heart failure exacerbation  3. History of coronary artery disease. 4.  Benign essential hypertension. 5.  Hypercholesteremia. .  6.  Left lung infiltrate/pneumonia  7. Hypernatremia    Plan:  1.  COntinue IV fluids at lower rate  2. Hold diuretics  3. IV antibiotics  4. Continue D5 solutions  5. Very poor prognosis. Suggest comfort care/ hospice  6. Patient would be a very poor candidate for dialysis.        Lesvia Rockwell MD  06/10/20  10:33 AM

## 2020-06-10 NOTE — PROGRESS NOTES
Palliative Care Progress Note  6/10/2020 2:01 PM  Subjective:   Admit Date: 5/31/2020  PCP: JANNET Mark    Chief Complaint: sedated and intubated    Interval History: Patient remains in CCU sedated, intubated, and mechanically ventilated. He continues to be followed by pulmonology and nebulizers, expectorants have been ordered. Continued attempts to wean sedation in preparation for possible SBT's. Nephrology continues to follow and IVF have been managed, urine output improved, Cr 3.3, . Cardiology continues to follow, HR 80 NSR. Continued discussions regarding goals. Portions of this note have been copied forward, however, changed to reflect the most current clinical status of this patient. Review of Systems  Unable to obtain, sedated and intubated      DIET TUBE FEED CONTINUOUS/CYCLIC NPO; Renal Formula (Nepro);  Orogastric; Continuous; 10; 31; 24    Medications:   dextrose 5 % and 0.2 % NaCl 75 mL/hr at 06/10/20 1036    nitroGLYCERIN Stopped (06/08/20 0100)    dilTIAZem Stopped (06/05/20 0219)    propofol 30 mcg/kg/min (06/10/20 0935)     Current Facility-Administered Medications   Medication Dose Route Frequency Provider Last Rate Last Dose    guaiFENesin (ROBITUSSIN) 100 MG/5ML liquid 400 mg  400 mg Per NG tube 4x daily Jenny Darden MD        ipratropium (ATROVENT) 0.02 % nebulizer solution 0.5 mg  0.5 mg Nebulization 4x daily Jenny Darden MD        acetylcysteine (MUCOMYST) 20 % solution 600 mg  600 mg Inhalation BID Tiago Mask, DO        dextrose 5 % and 0.2 % NaCl infusion   Intravenous Continuous Hernando Carrillo MD 75 mL/hr at 06/10/20 1036      levoFLOXacin (LEVAQUIN) 250 MG/50ML infusion 250 mg  250 mg Intravenous Q24H Volin Mask, DO   Stopped at 06/10/20 0944    carvedilol (COREG) tablet 6.25 mg  6.25 mg Oral BID  Edilia Garibay MD   6.25 mg at 06/10/20 0731    nitroGLYCERIN 50 mg in dextrose 5% 250 mL infusion  5 mcg/min changes. He does respond to pain and nursing reports he has opened his eyes to name occasionally as propofol is being weaned. Family would like to monitor patient for the next 48hrs to see if adjustments improve his status. If no improvement or ability to be weaned from the vent, they will likely choose to move toward hospice and comfort care. I will continue to follow and assist with discussions. Recommendations:   1. Continue to monitor for improvements and ability to undergo SBTs  2. If no improvement or decline, possible move toward hospice care for palliative extubation but this should be an ongoing discussion    Thank you for consulting Palliative Care and allowing us to participate in the care of this patient.        Electronically signed by JANNET Butts on 6/10/2020 at 2:01 PM    (Please note that portions of this note were completed with a voice recognition program.  [place] Must made to edit the dictations but occasionally words are mis-transcribed.)

## 2020-06-10 NOTE — PROGRESS NOTES
Pulmonary and Critical Care Progress Note [address] [facility]    Patient: Dominguez Hernandez    1955    MR# [id number]    Acct# [de-identified]   06/10/20   11:42 AM  Referring Provider: Guicho Law DO    Chief Complaint: Mechanically ventilated    Interval history: The patient remains on mechanical ventilatory support. He has had some low-grade fever. His chest x-ray does show to my review some increasing pulmonary vascular congestion. He is oxygenating adequately on FiO2 50% 5 of PEEP. Remains sedated but will open his eyes and follow simple commands. The nurses relate he is extremely weak. Also the respiratory therapist who was present when he self extubated a few days ago states he deteriorated extremely rapidly when his tube was out. We will try to wean his sedation. He is having some more issues with secretions so add mucolytic therapy and Atrovent. However my suspicion is that his overall condition particular his cardiac status is going to make it difficult for him to tolerate liberation from the ventilator. Medications:  levofloxacin, 250 mg, Intravenous, Q24H    carvedilol, 6.25 mg, Oral, BID WC    piperacillin-tazobactam, 3.375 g, Intravenous, Q12H    famotidine (PEPCID) injection, 20 mg, Intravenous, Daily    aspirin, 81 mg, Oral, Daily    chlorhexidine, 15 mL, Mouth/Throat, BID   Review of Systems:   Cannot obtain due to mechanical ventilation    Physical Exam:  Blood pressure 121/78, pulse 85, temperature 99.7 °F (37.6 °C), temperature source Temporal, resp. rate (!) 31, height 5' 9\" (1.753 m), weight 158 lb 1.6 oz (71.7 kg), SpO2 96 %.     Intake/Output Summary (Last 24 hours) at 6/10/2020 1142  Last data filed at 6/10/2020 0800  Gross per 24 hour   Intake 4180.17 ml   Output 2475 ml   Net 1705.17 ml        Vent Mode: AC/VC/Vt Ordered: 600 mL/Rate Set: 14 bmp/Pressure Support: 5 cmH20/PEEP/CPAP: 5/FiO2 : 50 %/   Peak Inspiratory Pressure: 26 cmH2O  Mean Airway Pressure: 18 6/10/2020 5:00 AM   HISTORY: Patient on ventilator   COMPARISON: June 9, 2020. FINDINGS:    Pulmonary vascular margins are slightly indistinct. Minimal   interstitial pulmonary vascular congestion may be present. Mild   increase in density in the right lung base possibly posterior small   right pleural effusion. . The cardiac silhouette is normal. Wires are   present previous median sternotomy. Endotracheal tube is present. .    The osseous structures and surrounding soft tissues demonstrate no   acute abnormality.       Impression   1. Slightly indistinct pulmonary vascular margins are noted. This is   suspicious for minimal or early interstitial pulmonary vascular   congestion. Signed by Dr Ana M Buck on 6/10/2020 7:15 AM             My radiograph interpretation: Endotracheal tube is in good position. I do think he is developing some increased pulmonary vascular congestion. Pulmonary Assessment:  1. Acute respiratory failure with hypoxemia. 2. Congestive heart failure with EF of 15%   3. Acute kidney injury  4. Atrial tachyarrhythmias with what appears to be A. fib flutter. Currently his rhythm appears to be sinus with an occasional ectopic. 5. Positive sputum culture for strep pneumoniae, penicillin sensitive, also Haemophilus influenza, both likely colonizers. Recommend:   · Continue mechanical ventilation. · We will try to decrease his patient. · We will add liquid guaifenesin per his orogastric tube and Atrovent neb treatments. · Again, I think his cardiac status is likely to be the limiting factor in potentially preventing successful liberation from the ventilator at this time.   · Continue antibiotic   · Pepcid for GI prophylaxis     Electronically signed by Lowry Schaumann on 6/10/2020 at 11:42 AM

## 2020-06-11 ENCOUNTER — OUTSIDE FACILITY SERVICE (OUTPATIENT)
Dept: PULMONOLOGY | Facility: CLINIC | Age: 65
End: 2020-06-11

## 2020-06-11 PROCEDURE — 99233 SBSQ HOSP IP/OBS HIGH 50: CPT | Performed by: INTERNAL MEDICINE

## 2020-06-11 NOTE — PROGRESS NOTES
Called nephrology group to rose who was on call for potassium of 3.3 at 0630. Waiting on response.      Electronically signed by Terell Garner RN on 6/11/2020 at 6:54 AM

## 2020-06-11 NOTE — PROGRESS NOTES
Fentanyl patch now place on R neck for pain. HSR SR with PVC. Rate 85. /69. Sats 95% on Vent at 40% FIO2. RR 22. Pt remains on 30mcg/kg/min of Propofol. Pt now noted to have hiccups.  Electronically signed by Yasmeen Rubi RN on 6/11/2020 at 5:44 PM

## 2020-06-11 NOTE — PROGRESS NOTES
on file     Attends Islam service: Not on file     Active member of club or organization: Not on file     Attends meetings of clubs or organizations: Not on file     Relationship status: Not on file    Intimate partner violence     Fear of current or ex partner: Not on file     Emotionally abused: Not on file     Physically abused: Not on file     Forced sexual activity: Not on file   Other Topics Concern    Not on file   Social History Narrative    ** Merged History Encounter **              Review of Systems:  Review of system is not possible as he is intubated and sedated.   Objective:  Patient Vitals for the past 24 hrs:   BP Temp Temp src Pulse Resp SpO2 Weight   06/11/20 1009 -- -- -- 73 14 95 % --   06/11/20 1000 126/72 -- -- 76 18 95 % --   06/11/20 0930 116/71 -- -- 78 21 95 % --   06/11/20 0900 113/67 -- -- 74 17 95 % --   06/11/20 0830 132/72 -- -- 77 17 95 % --   06/11/20 0800 115/68 99.8 °F (37.7 °C) Temporal 82 19 94 % --   06/11/20 0750 (!) 152/73 -- -- 84 -- -- --   06/11/20 0730 (!) 152/73 -- -- 89 23 96 % --   06/11/20 0700 121/76 -- -- 73 17 97 % --   06/11/20 0620 -- -- -- 77 14 98 % --   06/11/20 0600 110/70 -- -- 81 22 95 % --   06/11/20 0500 116/68 -- -- 83 20 95 % --   06/11/20 0437 -- -- -- -- 20 95 % --   06/11/20 0400 113/68 98.8 °F (37.1 °C) Temporal 87 22 93 % 161 lb 9.6 oz (73.3 kg)   06/11/20 0300 135/80 -- -- 88 23 95 % --   06/11/20 0224 -- -- -- 93 25 96 % --   06/11/20 0200 134/71 -- -- 79 20 95 % --   06/11/20 0100 114/65 -- -- 89 23 94 % --   06/11/20 0000 118/70 100.2 °F (37.9 °C) Temporal 91 23 93 % --   06/10/20 2300 118/65 -- -- 88 22 93 % --   06/10/20 2250 -- -- -- 88 25 93 % --   06/10/20 2200 138/79 -- -- 90 28 92 % --   06/10/20 2100 121/68 -- -- 81 18 96 % --   06/10/20 2000 118/66 98.9 °F (37.2 °C) Temporal 77 19 95 % --   06/10/20 1900 125/74 -- -- 83 13 94 % --   06/10/20 1830 113/68 -- -- 79 14 97 % --   06/10/20 1824 -- -- -- -- 19 95 % --   06/10/20 1823 -- Date: 5/31/2020  EXAMINATION:  XR CHEST PORTABLE  5/31/2020 12:25 PM HISTORY: Shortness of breath. COMPARISON: 5/30/2020. FINDINGS:  There is cardiomegaly. The central vessels are indistinct. There is increasing interstitial infiltrate bilaterally. The infiltrate is greater on the right. The patient is now intubated with endotracheal tube tip at the T3-4 level. 1. Worsening infiltrate bilaterally, likely pulmonary edema. Infection less likely. 2. Cardiomegaly. The central vessels are indistinct. There may be a small pleural effusion on the right. Signed by Dr Kelly Carrera on 5/31/2020 12:27 PM       Assessment   1. Acute kidney injury secondary to ATN. 2.  Congestive heart failure exacerbation improved. 3.  History of coronary artery disease. 4.  Benign essential hypertension. 5.  Hypercholesteremia. .  6.  Left lower lobe infiltrate/pneumonia. Plan:  1. Continue IV fluid. 2.  Start IV antibiotics. 3.  Baseline renal ultrasound. 4..  Agree to hold diuretics. Tosha Louie MD  06/11/20  11:19 AM  Nephrology ([facility]) Progress Note      Patient:  Sean Cristobal  YOB: 1955  Date of Service: 6/11/2020  MRN: [id number]   Acct: [de-identified]   Primary Care Physician: JANNET Mendoza  Advance Directive: DNR-CC  Admit Date: 5/31/2020       Hospital Day: 11  Referring Provider: Mauro Tobar MD    Patient independently seen and examined, Chart, Consults, Notes, Operative notes, Labs, Cardiology, and Radiology studies reviewed as able. Chief complaint: Abnormal labs. Subjective: Sean Cristobal is a 71 y.o. male  whom we were consulted for acute kidney injury. Patient denies any history of chronic kidney disease. His serum creatinine on admission was 1.2 mg. He presented with hypoxemic respiratory failure and was diagnosed with congestive heart failure/fluid overloaded.   Hospital course remarkable for treatment with intravenous Bumex drip and has Tobacco Use    Smoking status: Current Every Day Smoker     Packs/day: 0.50     Years: 14.00     Pack years: 7.00    Smokeless tobacco: Never Used   Substance and Sexual Activity    Alcohol use: Yes     Comment: occas---once/year    Drug use: No    Sexual activity: Not on file   Lifestyle    Physical activity     Days per week: Not on file     Minutes per session: Not on file    Stress: Not on file   Relationships    Social connections     Talks on phone: Not on file     Gets together: Not on file     Attends Mosque service: Not on file     Active member of club or organization: Not on file     Attends meetings of clubs or organizations: Not on file     Relationship status: Not on file    Intimate partner violence     Fear of current or ex partner: Not on file     Emotionally abused: Not on file     Physically abused: Not on file     Forced sexual activity: Not on file   Other Topics Concern    Not on file   Social History Narrative    ** Merged History Encounter **              Review of Systems:  Review of system is not possible as he is intubated and sedated.   Objective:  Patient Vitals for the past 24 hrs:   BP Temp Temp src Pulse Resp SpO2 Weight   06/11/20 1009 -- -- -- 73 14 95 % --   06/11/20 1000 126/72 -- -- 76 18 95 % --   06/11/20 0930 116/71 -- -- 78 21 95 % --   06/11/20 0900 113/67 -- -- 74 17 95 % --   06/11/20 0830 132/72 -- -- 77 17 95 % --   06/11/20 0800 115/68 99.8 °F (37.7 °C) Temporal 82 19 94 % --   06/11/20 0750 (!) 152/73 -- -- 84 -- -- --   06/11/20 0730 (!) 152/73 -- -- 89 23 96 % --   06/11/20 0700 121/76 -- -- 73 17 97 % --   06/11/20 0620 -- -- -- 77 14 98 % --   06/11/20 0600 110/70 -- -- 81 22 95 % --   06/11/20 0500 116/68 -- -- 83 20 95 % --   06/11/20 0437 -- -- -- -- 20 95 % --   06/11/20 0400 113/68 98.8 °F (37.1 °C) Temporal 87 22 93 % 161 lb 9.6 oz (73.3 kg)   06/11/20 0300 135/80 -- -- 88 23 95 % --   06/11/20 0224 -- -- -- 93 25 96 % --   06/11/20 0200 134/71 significant change. There is a trace pleural effusion at bilateral bases. There is no pulmonary congestion or pneumothorax. The endotracheal tube is in a stable and optimal positioning. Sternotomy sutures are seen in place without complication. No acute bony abnormality. Moderate improvement in right lung infiltrate. Persistent left lung infiltrate. A trace bibasilar pleural effusion. The above finding are recorded on a digital voice clip in PACS. Signed by Dr Makenna Iverson on 6/1/2020 8:09 AM    Xr Chest Portable    Result Date: 5/31/2020  EXAMINATION:  XR CHEST PORTABLE  5/31/2020 12:25 PM HISTORY: Shortness of breath. COMPARISON: 5/30/2020. FINDINGS:  There is cardiomegaly. The central vessels are indistinct. There is increasing interstitial infiltrate bilaterally. The infiltrate is greater on the right. The patient is now intubated with endotracheal tube tip at the T3-4 level. 1. Worsening infiltrate bilaterally, likely pulmonary edema. Infection less likely. 2. Cardiomegaly. The central vessels are indistinct. There may be a small pleural effusion on the right. Signed by Dr Pushpa Wakefield on 5/31/2020 12:27 PM       Assessment   1. Acute kidney injury- cardiorenal syndrome  2. Congestive heart failure exacerbation  3. History of coronary artery disease. 4.  Benign essential hypertension. 5.  Hypercholesteremia. .  6.  Left lung infiltrate/pneumonia  7. Hypernatremia    Plan:  1. Switch IV fluids to 1/2 NS  2. Continue to hold diuretics  3. IV antibiotics  4. Very poor prognosis. Suggest comfort care/ hospice  5. Patient would be a very poor candidate for dialysis.        Gabriela Hahn MD  06/11/20  11:19 AM

## 2020-06-11 NOTE — PROGRESS NOTES
SKIN - turgor is normal, no rash  PSYCHIATRIC - Intubated and sedated and not particularly responsive       ASSESSMENT:    ALL THE CARDIOLOGY PROBLEMS ARE LISTED ABOVE; HOWEVER, THE FOLLOWING SPECIFIC CARDIAC PROBLEMS / CONDITIONS WERE ADDRESSED AND TREATED DURING THE HOSPITAL VISIT TODAY:                                                                                            MEDICAL DECISION MAKING                   Cardiac Specific Problem / Diagnosis   Discussion and Data Reviewed Diagnostic or Therapeutic Procedures Ordered Management Options Selected                               1. Presenting problem / symptom     Progressive dyspnea  are worsening    remains intubated Yes: echo with an EF of 14% and diastolic dysfunction Continue current medications:     N/A                        2. CAD Initial presentation during this evaluation    Review and summation of old records:     10/22/2004  Cath  Stent to LAD, normal LVFX  12/17/2004  Cath  Patent stent in the LAD, normal LVFX  6/9/2005  Cath  Patent stent in the LAD, normal LVFX  9/19/2005  cardiolite negative for myocardial ischemia, EF 40%  7/24/2006  Non Q wave MI  7/24/2006  Cath  ptca to the diag  11/6/2007  Cath  instent restenosis of the LAD stent, normal LVFX  11/7/2007  CABG x 2 LIMA-LAD, VG-diag Marigene Stapleton)  11/2/2011  Cath  Patent LIMA-LAD, patent VG-diag, normal LVFX  3/19/2013  lexiscan Positive for anterior and inferior myocardial ischemia, EF 52%, 6% ischemic myocardium on stress, intermediate risk findings, AUC indication 16, AUC score 7  8/19/2013  Cath  Occluded LIMA-LAD, 2.0x8 BMS to VG-D1, normal LVFX   9/22/2018  ACS SARAH RISK Score 4 (angina, cad> 50, hypertension, hypercholesteremia, cigarette use, asa), AUC indication 3, AUC score 8   9/24/18  Cath  Patent LIMA-LAD, occluded VG-diag, anterior lateral hypokinesis, EF 45%  6/2/2020  Echo  EF 15%, DD       Yes: echo Continue current medications:     Yes: as per renal                 3.

## 2020-06-11 NOTE — PROGRESS NOTES
Pulmonary and Critical Care Progress Note [facility]    Patient: Reena Burnett    1955    MR# [id number]    Acct# [de-identified]   06/11/20   10:54 AM  Referring Provider: Mimi Weathers MD    Chief Complaint: Mechanically ventilated    Interval history: The patient remains on mechanical ventilatory support. CXR with pulmonary vascular congestion. His sat is 95% on 40% Fio2. Per nursing, wife was here this morning and long discussion with nursing and nephrology in regards to prognosis. Wife has decided for patient to go to Hospice and requests a meeting at 0830 tomorrow am. Nursing reports that she had the patient down to 20 mcg of Propofol and he was not following commands. He did open his eyes. She has since turned him up to 25 mcg. She suctioned 2 old blood clots yesterday afternoon and since then he has had blood tinged secretions. Medications:  guaiFENesin, 400 mg, Per NG tube, 4x daily    ipratropium, 0.5 mg, Nebulization, 4x daily    acetylcysteine, 600 mg, Inhalation, BID    levofloxacin, 250 mg, Intravenous, Q24H    carvedilol, 6.25 mg, Oral, BID WC    piperacillin-tazobactam, 3.375 g, Intravenous, Q12H    famotidine (PEPCID) injection, 20 mg, Intravenous, Daily    aspirin, 81 mg, Oral, Daily    chlorhexidine, 15 mL, Mouth/Throat, BID   Review of Systems:   Cannot obtain due to mechanical ventilation    Physical Exam:  Blood pressure (!) 152/73, pulse 73, temperature 98.8 °F (37.1 °C), temperature source Temporal, resp. rate 14, height 5' 9\" (1.753 m), weight 161 lb 9.6 oz (73.3 kg), SpO2 95 %.     Intake/Output Summary (Last 24 hours) at 6/11/2020 1054  Last data filed at 6/11/2020 0600  Gross per 24 hour   Intake 3156.11 ml   Output 1925 ml   Net 1231.11 ml        Vent Mode: AC/VC/Vt Ordered: 600 mL/Rate Set: 14 bmp/Pressure Support: 5 cmH20/PEEP/CPAP: 5/FiO2 : 40 %/   Peak Inspiratory Pressure: 30 cmH2O  Mean Airway Pressure: 13 cmH20  I:E Ratio: 1:2.70  Rate Measured: 16 assessment and plan to reflect my findings and impressions. Essential elements of the care plan were discussed with APRN above. Agree with findings and assessment/plan as documented above.     Electronically signed by Raffaele Marlow MD on 6/11/20 at 3:50 PM CDT

## 2020-06-11 NOTE — PROGRESS NOTES
[address]    Daily HOSPITAL Progress Note                            Date:  6/11/20  Patient: Terell Sow  Admission:  5/31/2020 12:06 PM  Admit DX: CHF (congestive heart failure), NYHA class IV, acute on chronic, systolic (HCC) [Z97.47]  Age:  71 y.o., 1955        Date of Admission 5/31/2020 12:06 PM   Hospital Length of Stay  LOS: 11 days        Date / Time Of Initially Being Seen For This Daily Visit:  6/11/20, at approximately 1000. At that time, I personally saw the patient and rounded with:  Derenda Canavan RN Nurse Navigator & Rey York   RN    Date / Time That This Note Is Written:  6/11/2020  at  5:10 PM        The observations documented in this note, including the assessment   and plan are mine    Portions of this note have been copied forward, from prior notes, yet modified, to reflect today's clinical status of this patient. Reason for initial evaluation or the patient's initial complaint    1. Reason for Hospital Admission: dyspnea        2. Reason for Cardiology Consultation: cardiomyopathy         SUBJECTIVE:      Chief Complaint / Reason for the Visit   Follow up of:  dyspnea and cardiomyopathy and systemic arterial hypertension    Family present and in room during examination:  No    At the time of the examination, the patient was:  Lying in bed, on the ventilator      Specialty Problems        Cardiology Problems    ACS (acute coronary syndrome) ([facility] [address]        CAD (coronary artery disease)        Hypercholesteremia        CAD (coronary artery disease)        Hypercholesteremia        Hypertension        Atypical chest pain        CHF (congestive heart failure), NYHA class IV, acute on chronic, systolic (HCC)              Current Status Today According to the patient:  Today he is feeling \"0\". Subjective:  Mr. Terell Sow is generally feeling unchanged.   unchanged    Mr. Terell Sow has the following cardiac complaints / symptoms today:    1. dyspnea, on MD   6.25 mg at 06/11/20 0750    nitroGLYCERIN 50 mg in dextrose 5% 250 mL infusion  5 mcg/min Intravenous Continuous Lindaklaudia Hsieh, DO   Stopped at 06/08/20 0100    morphine injection 2 mg  2 mg Intravenous Q4H PRN Isaias Noble MD   2 mg at 06/07/20 0006    piperacillin-tazobactam (ZOSYN) 3.375 g in dextrose 5 % 50 mL IVPB extended infusion (mini-bag)  3.375 g Intravenous Q12H Aj Carmona MD   Stopped at 06/11/20 1450    dilTIAZem 125 mg in dextrose 5 % 125 mL infusion  5 mg/hr Intravenous Continuous Linda Мария, DO   Stopped at 06/05/20 0219    famotidine (PEPCID) injection 20 mg  20 mg Intravenous Daily Linda Hsieh, DO   20 mg at 06/10/20 2006    aspirin chewable tablet 81 mg  81 mg Oral Daily Linda Hsieh, DO   81 mg at 06/11/20 0750    perflutren lipid microspheres (DEFINITY) injection 1.65 mg  1.5 mL Intravenous ONCE PRN Linda Hsieh DO        propofol injection  10 mcg/kg/min Intravenous Titrated Linda Мария, DO 13 mL/hr at 06/11/20 1534 30 mcg/kg/min at 06/11/20 1534    chlorhexidine (PERIDEX) 0.12 % solution 15 mL  15 mL Mouth/Throat BID Linda Мария, DO   15 mL at 06/11/20 7285     Allergies: Morphine; Sulfa antibiotics; and Iv contrast [iodides]  Past Medical History:   Diagnosis Date    CAD (coronary artery disease) 8/19/2013    10/22/2004  Stent to LAD 12/17/2004  Cath  Patent stent in the LAD, normal LVFX   6/9/2005  Cath  Patent stent in the LAD, normal LVFX 9/19/2005  cardiolite negative for myocardial ischemia 7/24/2006  Non Q wave MI 7/24/2006  PTCA to diagonal 11/7/2007  CABG x 2 LIMA-LAD, VG-diagonal 11/2/2011  Cath  Patent LIMA-LAD, patent VG-diag, normal LVFX 3/18/2013  lexiscan  Positive for anterior and inferi    Cigarette smoker 8/19/2013    Hypercholesteremia 8/19/2013    Hypertension 8/19/2013     Past Surgical History:   Procedure Laterality Date    CARDIAC CATHETERIZATION  8/19/13  JDT    with stent.  EF 60%    CORONARY ARTERY BYPASS GRAFT  11/7/2007    Dr. Kiser Prior       Family History   Problem Relation Age of Onset    High Blood Pressure Mother     High Blood Pressure Father     Cancer Sister      Social History     Tobacco Use    Smoking status: Current Every Day Smoker     Packs/day: 0.50     Years: 14.00     Pack years: 7.00    Smokeless tobacco: Never Used   Substance Use Topics    Alcohol use: Yes     Comment: occas---once/year          Review of Systems:    General:      Complaint / Symptom Yes / No / Description if Yes       Fatigue Yes:  chronic   Weight gain NA   Insomnia NA       Respiratory:        Complaint / Symptom Yes / No / Description if Yes       Cough No   Horseness NA       Cardiovascular:    Complaint / Symptom Yes / No / Description if Yes       Chest Pain N/A   Shortness of Air / Orthopnea Yes: chronic and about the same   Presyncope / Syncope No   Palpitations No         Objective:    /70   Pulse 79   Temp 99.7 °F (37.6 °C) (Temporal)   Resp 18   Ht 5' 9\" (1.753 m)   Wt 161 lb 9.6 oz (73.3 kg)   SpO2 96%   BMI 23.86 kg/m² ,     Intake/Output Summary (Last 24 hours) at 6/11/2020 1710  Last data filed at 6/11/2020 1601  Gross per 24 hour   Intake 3504.5 ml   Output 2150 ml   Net 1354.5 ml       GENERAL - well developed and well nourished, is not an active participant in this examination  HEENT -  PERRLA, Hearing not tested, conjunctiva and lids are normal, ears and nose appear normal  NECK - no thyromegaly, no JVD, trachea is in the midline  CARDIOVASCULAR - PMI is in the left mid line clavicular position, Normal S1 and S2 with a grade 1/6 systolic murmur. No S3 or S4    PULMONARY - Yes: severe, on ventilator respiratory distress. scattered wheezes and rales.   Breath sounds in both  lung fields are Decreased  ABDOMEN  - soft, non tender, no rebound, no hepatomegaly or splenomegaly  MUSCULOSKELETAL  - Prone/Supine, digitals and nails are without clubbing or

## 2020-06-11 NOTE — PROGRESS NOTES
06/11/20 0750    nitroGLYCERIN 50 mg in dextrose 5% 250 mL infusion  5 mcg/min Intravenous Continuous Slade Segura, DO   Stopped at 06/08/20 0100    morphine injection 2 mg  2 mg Intravenous Q4H PRN Reese Epley, MD   2 mg at 06/07/20 0006    piperacillin-tazobactam (ZOSYN) 3.375 g in dextrose 5 % 50 mL IVPB extended infusion (mini-bag)  3.375 g Intravenous Q12H Flaca Polanco MD 12.5 mL/hr at 06/11/20 1049 3.375 g at 06/11/20 1049    dilTIAZem 125 mg in dextrose 5 % 125 mL infusion  5 mg/hr Intravenous Continuous Slade Segura, DO   Stopped at 06/05/20 0219    famotidine (PEPCID) injection 20 mg  20 mg Intravenous Daily Slade Segura, DO   20 mg at 06/10/20 2006    aspirin chewable tablet 81 mg  81 mg Oral Daily Slade Segura, DO   81 mg at 06/11/20 0750    perflutren lipid microspheres (DEFINITY) injection 1.65 mg  1.5 mL Intravenous ONCE PRN Slade Segura, DO        propofol injection  10 mcg/kg/min Intravenous Titrated Slade Segura, DO 8.7 mL/hr at 06/11/20 0616 20 mcg/kg/min at 06/11/20 0616    chlorhexidine (PERIDEX) 0.12 % solution 15 mL  15 mL Mouth/Throat BID Slade Segura, DO   15 mL at 06/11/20 0751        Labs:     No results for input(s): WBC, RBC, HGB, HCT, MCV, MCH, MCHC, PLT in the last 72 hours. Recent Labs     06/09/20  0150  06/10/20  0659 06/11/20  0257 06/11/20  0435   *  --  146* 138  --    K 3.8   < > 4.8 3.3* 3.2   ANIONGAP 16  --  18 14  --    *  --  110 107  --    CO2 20*  --  18* 17*  --    *  --  106* 104*  --    CREATININE 3.5*  --  3.3* 3.3*  --    GLUCOSE 147*  --  146* 128*  --    CALCIUM 8.9  --  8.4* 8.6*  --     < > = values in this interval not displayed. No results for input(s): MG, PHOS in the last 72 hours. No results for input(s): AST, ALT, ALB, BILITOT, ALKPHOS, ALB in the last 72 hours.   ABGs:  Recent Labs     06/09/20  0434 06/10/20  0429 06/11/20  0435   PHART 7.430 7.410 7.430   [id number] 33.0*

## 2020-06-12 PROBLEM — J96.01 ACUTE RESPIRATORY FAILURE WITH HYPOXIA (HCC): Status: ACTIVE | Noted: 2020-01-01

## 2020-06-12 PROBLEM — I25.5 ISCHEMIC CARDIOMYOPATHY: Chronic | Status: ACTIVE | Noted: 2020-01-01

## 2020-06-12 NOTE — PROGRESS NOTES
Results for Leopold Macho (MRN [id number]) as of 6/12/2020 04:22   Ref.  Range 6/12/2020 04:21   Hemoglobin, Art, Extended Latest Ref Range: 14.0 - 18.0 g/dL 10.1 (L)   pH, Arterial Latest Ref Range: 7.350 - 7.450  7.420   pCO2, Arterial Latest Ref Range: 35.0 - 45.0 mmHg 33.0 (L)   pO2, Arterial Latest Ref Range: 80.0 - 100.0 mmHg 87.0   HCO3, Arterial Latest Ref Range: 22.0 - 26.0 mmol/L 21.4 (L)   Base Excess, Arterial Latest Ref Range: -2.0 - 2.0 mmol/L -2.5 (L)   O2 Sat, Arterial Latest Ref Range: >92 % 94.9   O2 Content, Arterial Latest Ref Range: Not Established mL/dL 13.6   Methemoglobin, Arterial Latest Ref Range: <1.5 % 1.9   Carboxyhgb, Arterial Latest Ref Range: 0.0 - 5.0 % 2.5   Patient on Vent  AC/ , 14, +5, 40%  RR  +AT

## 2020-06-12 NOTE — PROGRESS NOTES
Nephrology ([facility]) Progress Note      Patient:  Dominguez Hernandez  YOB: 1955  Date of Service: 6/12/2020  MRN: [id number]   Acct: [de-identified]   Primary Care Physician: JANNET Morgan  Advance Directive: DNR-CC  Admit Date: 5/31/2020       Hospital Day: 12  Referring Provider: Dunia Yousif MD    Patient independently seen and examined, Chart, Consults, Notes, Operative notes, Labs, Cardiology, and Radiology studies reviewed as able. Chief complaint: Abnormal labs. Subjective: Dominguez Hernandez is a 71 y.o. male  whom we were consulted for acute kidney injury. No known history of chronic kidney disease. His serum creatinine on admission was 1.2 mg. He presented with hypoxemic respiratory failure and was diagnosed with congestive heart failure/fluid overload. Hospital course remarkable for treatment with intravenous Bumex drip and has excellent diuresis. He is currently on a ventilator and has failed weaning off the vent. Chest x-ray has significantly improved. However his serum creatinine has risen to 3.4 mg. Patient remains in ICU/intubated, currently receiving slow hydration and has no improvement of renal function. Repeat chest x-ray shows persistent left lower lobe infiltrate consistent with pneumonia. Rest of the lungs are clear. Patient is non-oliguric. On June 5, patient was self-extubated and was then reintubated as he has excessive amount of secretion. He remained on ventilator. His renal function was initially improving but then started to worsen under diuretics. He remained intubated, vented and sedated. He is non-oliguric now. Diuretics are on hold and patient is getting IV fluids. Today, he remained intubated, vented. He is non-oliguric.      Allergies:  Morphine; Sulfa antibiotics; and Iv contrast [iodides]    Medicines:  Current Facility-Administered Medications   Medication Dose Route Frequency Provider Last Rate Last Dose    midazolam (VERSED) 100 mg in dextrose 5 % 100 mL infusion  1 mg/hr Intravenous Continuous JANNET Veloz 1 mL/hr at 06/12/20 1021 1 mg/hr at 06/12/20 1021    HYDROmorphone HCl PF (DILAUDID) 10 mg in sodium chloride 0.9 % 50 mL infusion  1 mg/hr Intravenous Continuous JANNET Veloz 5 mL/hr at 06/12/20 1022 1 mg/hr at 06/12/20 1022    0.45 % sodium chloride infusion   Intravenous Continuous Darin Carter MD 50 mL/hr at 06/11/20 1051      fentaNYL (DURAGESIC) 25 MCG/HR 1 patch  1 patch Transdermal Q72H Shannon Hauser MD   1 patch at 06/11/20 1734    guaiFENesin (ROBITUSSIN) 100 MG/5ML liquid 400 mg  400 mg Per NG tube 4x daily Star Parnell MD   400 mg at 06/11/20 2000    ipratropium (ATROVENT) 0.02 % nebulizer solution 0.5 mg  0.5 mg Nebulization 4x daily Star Parnell MD   0.5 mg at 06/12/20 0950    acetylcysteine (MUCOMYST) 20 % solution 600 mg  600 mg Inhalation BID [facility] DO   600 mg at 06/12/20 0261    levoFLOXacin (LEVAQUIN) 250 MG/50ML infusion 250 mg  250 mg Intravenous Q24H [facility], DO   Stopped at 06/11/20 1150    carvedilol (COREG) tablet 6.25 mg  6.25 mg Oral BID  Vivian Garnica MD   6.25 mg at 06/11/20 1734    nitroGLYCERIN 50 mg in dextrose 5% 250 mL infusion  5 mcg/min Intravenous Continuous [facility], DO   Stopped at 06/08/20 0100    morphine injection 2 mg  2 mg Intravenous Q4H PRN Shannon Hauser MD   2 mg at 06/07/20 0006    piperacillin-tazobactam (ZOSYN) 3.375 g in dextrose 5 % 50 mL IVPB extended infusion (mini-bag)  3.375 g Intravenous Q12H Roselyn Arnold MD   Stopped at 06/12/20 0225    dilTIAZem 125 mg in dextrose 5 % 125 mL infusion  5 mg/hr Intravenous Continuous [facility], DO   Stopped at 06/05/20 0219    famotidine (PEPCID) injection 20 mg  [facility]   20 mg at 06/11/20 2000    aspirin chewable tablet 81 mg  81 mg Oral Daily [facility]    81 mg at 06/11/20 0469    perflutren lipid microspheres (DEFINITY) injection 1.65 mg  1.5 mL Intravenous ONCE PRN Graydon Fore, DO        propofol injection  10 mcg/kg/min Intravenous Titrated Graydon Fore, DO 15.1 mL/hr at 06/12/20 0407 35 mcg/kg/min at 06/12/20 0407    chlorhexidine (PERIDEX) 0.12 % solution 15 mL  15 mL Mouth/Throat BID Graydon Fore, DO   15 mL at 06/11/20 2000       Past Medical History:  Past Medical History:   Diagnosis Date    CAD (coronary artery disease) 8/19/2013    10/22/2004  Stent to LAD 12/17/2004  Cath  Patent stent in the LAD, normal LVFX   6/9/2005  Cath  Patent stent in the LAD, normal LVFX 9/19/2005  cardiolite negative for myocardial ischemia 7/24/2006  Non Q wave MI 7/24/2006  PTCA to diagonal 11/7/2007  CABG x 2 LIMA-LAD, VG-diagonal 11/2/2011  Cath  Patent LIMA-LAD, patent VG-diag, normal LVFX 3/18/2013  lexiscan  Positive for anterior and inferi    Cigarette smoker 8/19/2013    Hypercholesteremia 8/19/2013    Hypertension 8/19/2013       Past Surgical History:  Past Surgical History:   Procedure Laterality Date    CARDIAC CATHETERIZATION  8/19/13  JDT    with stent.  EF 60%    CORONARY ARTERY BYPASS GRAFT  11/7/2007    Dr. Bhardwaj Coast History  Family History   Problem Relation Age of Onset    High Blood Pressure Mother     High Blood Pressure Father     Cancer Sister        Social History  Social History     Socioeconomic History    Marital status:      Spouse name: Not on file    Number of children: Not on file    Years of education: Not on file    Highest education level: Not on file   Occupational History    Not on file   Social Needs    Financial resource strain: Not on file    Food insecurity     Worry: Not on file     Inability: Not on file    Transportation needs     Medical: Not on file     Non-medical: Not on file   Tobacco Use    Smoking status: Current Every Day Smoker     Packs/day: 0.50     Years: 14.00 PACS. Signed by Dr Trino Lara on 6/1/2020 8:09 AM    Xr Chest Portable    Result Date: 5/31/2020  EXAMINATION:  XR CHEST PORTABLE  5/31/2020 12:25 PM HISTORY: Shortness of breath. COMPARISON: 5/30/2020. FINDINGS:  There is cardiomegaly. The central vessels are indistinct. There is increasing interstitial infiltrate bilaterally. The infiltrate is greater on the right. The patient is now intubated with endotracheal tube tip at the T3-4 level. 1. Worsening infiltrate bilaterally, likely pulmonary edema. Infection less likely. 2. Cardiomegaly. The central vessels are indistinct. There may be a small pleural effusion on the right. Signed by Dr Skinny Pagan on 5/31/2020 12:27 PM       Assessment   1. Acute kidney injury secondary to ATN. 2.  Congestive heart failure exacerbation improved. 3.  History of coronary artery disease. 4.  Benign essential hypertension. 5.  Hypercholesteremia. .  6.  Left lower lobe infiltrate/pneumonia. Plan:  1. Continue IV fluid. 2.  Start IV antibiotics. 3.  Baseline renal ultrasound. 4..  Agree to hold diuretics. Alexandria Perry MD  06/12/20  10:52 AM  Nephrology ([facility]) Progress Note      Patient:  Dominguez Hernandez  YOB: 1955  Date of Service: 6/12/2020  MRN: [id number]   Acct: [de-identified]   Primary Care Physician: JANNET Morgan  Advance Directive: DNR-CC  Admit Date: 5/31/2020       Hospital Day: 12  Referring Provider: Dunia Yousif MD    Patient independently seen and examined, Chart, Consults, Notes, Operative notes, Labs, Cardiology, and Radiology studies reviewed as able. Chief complaint: Abnormal labs. Subjective: Dominguez Hernandez is a 71 y.o. male  whom we were consulted for acute kidney injury. Patient denies any history of chronic kidney disease. His serum creatinine on admission was 1.2 mg.   He presented with hypoxemic respiratory failure and was diagnosed with congestive heart failure/fluid morphine injection 2 mg  2 mg Intravenous Q4H PRN Joseph Jiménez MD   2 mg at 06/07/20 0006    piperacillin-tazobactam (ZOSYN) 3.375 g in dextrose 5 % 50 mL IVPB extended infusion (mini-bag)  3.375 g Intravenous Q12H Calli Mar MD   Stopped at 06/12/20 0225    dilTIAZem 125 mg in dextrose 5 % 125 mL infusion  5 mg/hr Intravenous Continuous Alisha , DO   Stopped at 06/05/20 0219    famotidine (PEPCID) injection 20 mg  20 mg Intravenous Daily Alisha , DO   20 mg at 06/11/20 2000    aspirin chewable tablet 81 mg  81 mg Oral Daily Alisha , DO   81 mg at 06/11/20 0750    perflutren lipid microspheres (DEFINITY) injection 1.65 mg  1.5 mL Intravenous ONCE PRN Alisha , DO        propofol injection  10 mcg/kg/min Intravenous Titrated Alisha , DO 15.1 mL/hr at 06/12/20 0407 35 mcg/kg/min at 06/12/20 0407    chlorhexidine (PERIDEX) 0.12 % solution 15 mL  15 mL Mouth/Throat BID Alisha , DO   15 mL at 06/11/20 2000       Past Medical History:  Past Medical History:   Diagnosis Date    CAD (coronary artery disease) 8/19/2013    10/22/2004  Stent to LAD 12/17/2004  Cath  Patent stent in the LAD, normal LVFX   6/9/2005  Cath  Patent stent in the LAD, normal LVFX 9/19/2005  cardiolite negative for myocardial ischemia 7/24/2006  Non Q wave MI 7/24/2006  PTCA to diagonal 11/7/2007  CABG x 2 LIMA-LAD, VG-diagonal 11/2/2011  Cath  Patent LIMA-LAD, patent VG-diag, normal LVFX 3/18/2013  lexiscan  Positive for anterior and inferi    Cigarette smoker 8/19/2013    Hypercholesteremia 8/19/2013    Hypertension 8/19/2013       Past Surgical History:  Past Surgical History:   Procedure Laterality Date    CARDIAC CATHETERIZATION  8/19/13  JDT    with stent.  EF 60%    CORONARY ARTERY BYPASS GRAFT  11/7/2007    Dr. Vincente Cockayne History  Family History   Problem Relation Age of Onset    High Blood Pressure Mother     High Blood view of the chest is compared with the previous study dated 5/31/2020. There is moderate improvement in the right lung infiltrate since the previous study. The left lung infiltrate persist without any significant change. There is a trace pleural effusion at bilateral bases. There is no pulmonary congestion or pneumothorax. The endotracheal tube is in a stable and optimal positioning. Sternotomy sutures are seen in place without complication. No acute bony abnormality. Moderate improvement in right lung infiltrate. Persistent left lung infiltrate. A trace bibasilar pleural effusion. The above finding are recorded on a digital voice clip in PACS. Signed by Dr Ginna Triana on 6/1/2020 8:09 AM    Xr Chest Portable    Result Date: 5/31/2020  EXAMINATION:  XR CHEST PORTABLE  5/31/2020 12:25 PM HISTORY: Shortness of breath. COMPARISON: 5/30/2020. FINDINGS:  There is cardiomegaly. The central vessels are indistinct. There is increasing interstitial infiltrate bilaterally. The infiltrate is greater on the right. The patient is now intubated with endotracheal tube tip at the T3-4 level. 1. Worsening infiltrate bilaterally, likely pulmonary edema. Infection less likely. 2. Cardiomegaly. The central vessels are indistinct. There may be a small pleural effusion on the right. Signed by Dr Genet Stauffer on 5/31/2020 12:27 PM       Assessment   1. Acute kidney injury- cardiorenal syndrome  2. Congestive heart failure exacerbation  3. History of coronary artery disease. 4.  Benign essential hypertension. 5.  Hypercholesteremia. .  6.  Left lung infiltrate/pneumonia  7. Hypernatremia    Plan:  1. Continue gentle IV hydration  2. Continue to hold diuretics  3. IV antibiotics  4. Very poor prognosis. Suggest comfort care/ hospice  5. Patient would be a very poor candidate for dialysis.        Cash Maldonado MD  06/12/20  10:52 AM

## 2020-06-13 LAB
BLOOD CULTURE, ROUTINE: NORMAL
CULTURE, BLOOD 2: NORMAL

## 2021-09-29 NOTE — PROGRESS NOTES
PHART 7.410 7.430 7.420   GEC3NOB 33.0* 31.0* 33.0*   PO2ART 70.0* 70.0* 87.0   AWN2TAZ 20.9* 20.6* 21.4*   BEART -3.0* -2.9* -2.5*   HGBAE 11.9* 11.2* 10.1*   E7YOLVPW 93.0 93.8 94.9   CARBOXHGBART 2.3 2.5 2.5   02THERAPY Unknown Unknown Unknown     HgBA1c: No results for input(s): LABA1C in the last 72 hours. FLP:    Lab Results   Component Value Date    TRIG 158 09/23/2018    HDL 34 09/23/2018    LDLCALC 104 09/23/2018     TSH:  No results found for: TSH  Troponin T: No results for input(s): TROPONINI in the last 72 hours. INR: No results for input(s): INR in the last 72 hours. Objective:   Vitals: /64   Pulse 75   Temp 98 °F (36.7 °C) (Temporal)   Resp 14   Ht 5' 9\" (1.753 m)   Wt 162 lb 12.8 oz (73.8 kg)   SpO2 97%   BMI 24.04 kg/m²   24HR INTAKE/OUTPUT:      Intake/Output Summary (Last 24 hours) at 6/12/2020 0815  Last data filed at 6/12/2020 0600  Gross per 24 hour   Intake 3135.34 ml   Output 2625 ml   Net 510.34 ml     Physical Exam  General appearance: sedated, intubated, no acute distress  HEENT: atraumatic, eyes with clear conjunctiva and normal lids, pupils and irises normal, external ears and nose are normal,lips normal, ETT in place  Neck: without masses, supple, trachea midline  Lungs: no increased work of breathing, ventilating bilaterally, lungs with rhonchi in RLL, increased oral secretions  Heart: regular rate and rhythm and S1, S2 normal  Abdomen: soft, non-tender; bowel sounds normal; no masses,  no organomegaly  Genitourinary: No bladder fullness, masses, or tenderness  Extremities: extremities normal, atraumatic, no cyanosis or edema  Neurologic: sedated, responds to painful stimuli  Psychiatric: sedated  Skin: warm, dry      Assessment and Plan: Active Problems:    CHF (congestive heart failure), NYHA class IV, acute on chronic, systolic (HCC)    Acute respiratory failure with hypoxia and hypercapnia (HCC)  Resolved Problems:    * No resolved hospital problems. oral